# Patient Record
Sex: FEMALE | Race: OTHER | Employment: UNEMPLOYED | URBAN - METROPOLITAN AREA
[De-identification: names, ages, dates, MRNs, and addresses within clinical notes are randomized per-mention and may not be internally consistent; named-entity substitution may affect disease eponyms.]

---

## 2017-01-15 RX ORDER — LEVOTHYROXINE SODIUM 0.12 MG/1
TABLET ORAL
Qty: 90 TABLET | Refills: 0 | Status: SHIPPED | OUTPATIENT
Start: 2017-01-15 | End: 2017-05-02

## 2017-01-15 NOTE — TELEPHONE ENCOUNTER
From: rTessa Saab  To:  Maritza Rose MD  Sent: 1/11/2017 10:36 PM CST  Subject: Medication Renewal Request    Original authorizing provider: Karey Galeazzi, MD Marcelyn Seamen would like a refill of the following medications:  Bacilio Schmidt

## 2017-01-15 NOTE — TELEPHONE ENCOUNTER
Hypothyroid Medications: Medication refilled for 90 days per protocol.     Protocol Criteria:  Appointment scheduled in the past 12 months or the next 3 months  TSH resulted in the past 12 months that is normal  Recent Visits       Provider Department [facility]

## 2017-02-16 ENCOUNTER — TELEPHONE (OUTPATIENT)
Dept: FAMILY MEDICINE CLINIC | Facility: CLINIC | Age: 42
End: 2017-02-16

## 2017-02-16 NOTE — TELEPHONE ENCOUNTER
Pt would like a copy of her progress note in detail from the office visit for 7-1-16. Per pt she needs this for the insurance. Pt would like to  the note tomorrow at the [facility] location. Pt would like a call back in Kazakh.  Please,call pt with any que

## 2017-02-18 NOTE — TELEPHONE ENCOUNTER
Spoke to patient notified her that copies of her office visit notes from 07/2016 are ready to be picked up. Patient can stop by ADO . Patient verbalized understanding and stated she would stop by today to .

## 2017-03-01 ENCOUNTER — TELEPHONE (OUTPATIENT)
Dept: FAMILY MEDICINE CLINIC | Facility: CLINIC | Age: 42
End: 2017-03-01

## 2017-03-02 NOTE — TELEPHONE ENCOUNTER
Congolese speaking - pt stts her  and son dropped papers  in the inbox of nurse Grimaldo. Pt wanted to know if we received them.  Please advise

## 2017-03-02 NOTE — TELEPHONE ENCOUNTER
I called the lab from which patient is receiving bill regarding DOS 01/17/17, this patient was not seen on that DOS with Dr Bereket Oneil. Spoke to April from the lab and she said that she would look into it for now but that patient could disregard the bill.  The labs

## 2017-03-21 ENCOUNTER — TELEPHONE (OUTPATIENT)
Dept: FAMILY MEDICINE CLINIC | Facility: CLINIC | Age: 42
End: 2017-03-21

## 2017-03-21 NOTE — TELEPHONE ENCOUNTER
Tanzanian Only - pt. Called in regards to symptoms she is having. Stated that the last time she had a doctor's visit, she was advised that she was in menopause. However, she has been taking birth control and has gotten her period twice already.  She isn't in

## 2017-03-21 NOTE — TELEPHONE ENCOUNTER
Actions Requested:   Situation/Background   Problem: Vaginal Bleeding. Onset: Today 03/21/17   Associated Symptoms:  Patient had her menstrual period 2 weeks ago which lasted for 6 days. Denies any abdominal cramps or other gynecological symptoms.     His

## 2017-03-22 NOTE — TELEPHONE ENCOUNTER
Spoke to pt via language line. [id number] f/u on vaginal bleeding. Appt scheduled for 4/3. Per pt bleeding sized less than quarter sized. Denies changing pads often, denies SOB, lightheadedness, dizziness, or feeling of faint.  Per pt cannot be seen sooner due

## 2017-04-03 ENCOUNTER — OFFICE VISIT (OUTPATIENT)
Dept: FAMILY MEDICINE CLINIC | Facility: CLINIC | Age: 42
End: 2017-04-03

## 2017-04-03 ENCOUNTER — LAB ENCOUNTER (OUTPATIENT)
Dept: LAB | Age: 42
End: 2017-04-03
Attending: FAMILY MEDICINE
Payer: COMMERCIAL

## 2017-04-03 VITALS
TEMPERATURE: 98 F | DIASTOLIC BLOOD PRESSURE: 90 MMHG | HEART RATE: 87 BPM | WEIGHT: 221 LBS | SYSTOLIC BLOOD PRESSURE: 146 MMHG | BODY MASS INDEX: 37 KG/M2

## 2017-04-03 DIAGNOSIS — A04.8 HELICOBACTER PYLORI INFECTION: Primary | ICD-10-CM

## 2017-04-03 DIAGNOSIS — E03.9 ACQUIRED HYPOTHYROIDISM: ICD-10-CM

## 2017-04-03 DIAGNOSIS — A04.8 HELICOBACTER PYLORI INFECTION: ICD-10-CM

## 2017-04-03 DIAGNOSIS — N92.1 MENORRHAGIA WITH IRREGULAR CYCLE: Primary | ICD-10-CM

## 2017-04-03 PROCEDURE — 83013 H PYLORI (C-13) BREATH: CPT

## 2017-04-03 PROCEDURE — 36416 COLLJ CAPILLARY BLOOD SPEC: CPT | Performed by: FAMILY MEDICINE

## 2017-04-03 PROCEDURE — 85018 HEMOGLOBIN: CPT | Performed by: FAMILY MEDICINE

## 2017-04-03 PROCEDURE — 99214 OFFICE O/P EST MOD 30 MIN: CPT | Performed by: FAMILY MEDICINE

## 2017-04-03 RX ORDER — DOXYCYCLINE 100 MG/1
100 TABLET ORAL 2 TIMES DAILY
Qty: 14 TABLET | Refills: 0 | Status: SHIPPED | OUTPATIENT
Start: 2017-04-03 | End: 2017-05-13 | Stop reason: ALTCHOICE

## 2017-04-03 RX ORDER — PANTOPRAZOLE SODIUM 20 MG/1
20 TABLET, DELAYED RELEASE ORAL
COMMUNITY
End: 2017-05-13

## 2017-04-03 NOTE — PROGRESS NOTES
4/3/2017  8:59 AM    Newton Decker is a 43year old female.     Chief complaint(s): Patient presents with:  Menstrual Problem: pt c/o irregular vaginal bleeding, states period started 3/6/2017 and still has vaginal bleeding as of today    HPI:     Acacia Coreas Immunization History  Administered            Date(s) Administered    Flulaval, 3 Years & >, IM                          10/16/2007  09/28/2010      Fluvirin, 3 Years & >, Im                          12/13/2005 11/02/2006 11/06/2012      Influenza depressed mood. PHYSICAL EXAM:   Physical Exam    Constitutional: She appears well-developed and well-nourished. /90 mmHg  Pulse 87  Temp(Src) 98.1 °F (36.7 °C) (Oral)  Wt 221 lb (100.245 kg)  LMP 03/06/2017  Breastfeeding?  No   HENT:   Head: Encounter  POC Hemoglobin [97926]  Chlamydia/Gc Amplification [E]        RECOMMENDATIONS given include: Please, call our office with any questions or concerns.  Notify Dr Angelo Devine or the [facility] if there is a deterioration or worsening of the medica daily.      Norethin-Eth Estrad Triphasic (ORTHO-NOVUM 7/7/7, 28,) 0.5/0.75/1-35 MG-MCG Oral Tab 1 Package 6      Sig: Take 1 tablet by mouth daily.            Imaging & Referrals:  None         Jaziel Ribeiro MD

## 2017-04-04 ENCOUNTER — TELEPHONE (OUTPATIENT)
Dept: FAMILY MEDICINE CLINIC | Facility: CLINIC | Age: 42
End: 2017-04-04

## 2017-04-04 DIAGNOSIS — R10.13 EPIGASTRIC ABDOMINAL PAIN: Primary | ICD-10-CM

## 2017-04-04 NOTE — TELEPHONE ENCOUNTER
Patient called again regarding the status of her message. She is still having pain and she states that she has taken pantoprazole but the pain is still there. Patient speaks Haitian.

## 2017-04-04 NOTE — TELEPHONE ENCOUNTER
Had a visit with Dr. Natalia Pierre yesterday 04/03. Prescribed her the following medication:   Pantoprazole Sodium 20 MG Oral Tab EC Take 20 mg by mouth every morning before breakfast. Disp:  Rfl:      States that she is experiencing symptoms.  Pain keeps coming

## 2017-04-05 NOTE — PROGRESS NOTES
Quick Note:    Please call patient, results + for H pylori will start treatment after her next appt with me  ______

## 2017-04-06 ENCOUNTER — TELEPHONE (OUTPATIENT)
Dept: FAMILY MEDICINE CLINIC | Facility: CLINIC | Age: 42
End: 2017-04-06

## 2017-04-06 ENCOUNTER — TELEPHONE (OUTPATIENT)
Dept: INTERNAL MEDICINE CLINIC | Facility: CLINIC | Age: 42
End: 2017-04-06

## 2017-04-06 NOTE — TELEPHONE ENCOUNTER
----- Message from Radha Moctezuma MD sent at 4/5/2017  2:38 PM CDT -----  Please call patient, results + for H pylori will start treatment after her next appt with me    Notes Recorded by Glenys Maldonado MD on 4/4/2017 at 12:50 PM  Please call edvin

## 2017-04-06 NOTE — TELEPHONE ENCOUNTER
Fernandez Garcia -  Pt calling states Dr was going to increase the dosage of her birthcontrol med. Pt would like to know if Dr still wants to change her med or is she to continue on same dosage.  Pt states meds she  at pharmacy were the same dosage sh

## 2017-04-06 NOTE — TELEPHONE ENCOUNTER
With  # [id number]    Spoke with patient and relayed doctor message in regards to test results. Verbalized understanding.  Patient wanted to make appt with RM for next week - is having US Abdomen done 4/10/17 - Appt made for patient per her request.

## 2017-04-10 ENCOUNTER — HOSPITAL ENCOUNTER (OUTPATIENT)
Dept: ULTRASOUND IMAGING | Age: 42
Discharge: HOME | End: 2017-04-10
Attending: FAMILY MEDICINE
Payer: COMMERCIAL

## 2017-04-10 DIAGNOSIS — R10.13 EPIGASTRIC ABDOMINAL PAIN: ICD-10-CM

## 2017-04-10 PROCEDURE — 76700 US EXAM ABDOM COMPLETE: CPT

## 2017-04-11 ENCOUNTER — OFFICE VISIT (OUTPATIENT)
Dept: FAMILY MEDICINE CLINIC | Facility: CLINIC | Age: 42
End: 2017-04-11

## 2017-04-11 VITALS
TEMPERATURE: 98 F | SYSTOLIC BLOOD PRESSURE: 148 MMHG | DIASTOLIC BLOOD PRESSURE: 88 MMHG | HEART RATE: 90 BPM | BODY MASS INDEX: 36.82 KG/M2 | HEIGHT: 65 IN | WEIGHT: 221 LBS

## 2017-04-11 DIAGNOSIS — M77.11 EPICONDYLITIS, LATERAL, RIGHT: ICD-10-CM

## 2017-04-11 DIAGNOSIS — E28.2 POLYCYSTIC DISEASE, OVARIES: Primary | ICD-10-CM

## 2017-04-11 DIAGNOSIS — E66.3 OVERWEIGHT(278.02): ICD-10-CM

## 2017-04-11 DIAGNOSIS — R10.13 EPIGASTRIC ABDOMINAL PAIN: ICD-10-CM

## 2017-04-11 PROCEDURE — 99214 OFFICE O/P EST MOD 30 MIN: CPT | Performed by: FAMILY MEDICINE

## 2017-04-11 PROCEDURE — 99213 OFFICE O/P EST LOW 20 MIN: CPT | Performed by: FAMILY MEDICINE

## 2017-04-11 NOTE — PROGRESS NOTES
4/11/2017  10:38 AM    Afshan Pendleton is a 43year old female. Chief complaint(s): Patient presents with: Follow - Up: Patient here to f/u on her test results from ultrasound and labs.       HPI:     Afshan Pendleton primary complaint is regardi Circulatory Problems Mother      Peripheral vascular disease   • Cancer Paternal Grandfather      Liver cancer    • Alcohol and Other Disorders Associated Paternal Grandfather      Alcoholism   • Diabetes Paternal Grandmother      Type 2      Social Histor times daily with meals. Disp: 60 tablet Rfl: 1   FLUoxetine HCl 20 MG Oral Tab Take 1 tablet (20 mg total) by mouth daily.  Disp: 30 tablet Rfl: 4       Allergies:  No Known Allergies      ROS:   Review of Systems   Constitutional: Positive for unexpected w EKG / Spirometry : -     Radiology: Us Abdomen Complete (cpt=76700)    4/10/2017  PROCEDURE: US ABDOMEN COMPLETE (CPT=76700)  COMPARISON: None. INDICATIONS: Epigastric pain. Hypothyroidism. Polycystic ovarian syndrome. ?  Hypertensive  TECHNIQUE:   Th then 2.4mg for the 4th week , then increase to 3.0mg on the 5th week and remain at 3.0 mg SQ every night. RECOMMENDATIONS given include: Please, call our office with any questions or concerns.  Notify Dr Alta Dia or the [facility] if there is a Visit:  No orders of the defined types were placed in this encounter.        Meds This Visit:    Signed Prescriptions Disp Refills    Liraglutide -Weight Management (SAXENDA) 18 MG/3ML Subcutaneous Solution Pen-injector 2 pen 4      Sig: Inject 3 mg into th

## 2017-04-12 ENCOUNTER — TELEPHONE (OUTPATIENT)
Dept: FAMILY MEDICINE CLINIC | Facility: CLINIC | Age: 42
End: 2017-04-12

## 2017-04-12 NOTE — TELEPHONE ENCOUNTER
[facility] calling states please advise med only come s in box of 5 would you like to give pt one or two boxes.  Please call with verbal.       Current Outpatient Prescriptions:  Liraglutide -Weight Management (SAXENDA) 18 MG/3ML Subcutaneous Solution

## 2017-04-13 NOTE — TELEPHONE ENCOUNTER
Pt states that the medication that was prescribed to her comes in a box of 5 and would like to know if it can be changed or can doctor prescribe something different to her.  Pt can be reached at 21 [phone], pt would like a call back from [address] [facility]

## 2017-04-13 NOTE — TELEPHONE ENCOUNTER
Guyanese speaking - pt stts she would like a PA for Rx Saxenda. Please advise     Current outpatient prescriptions:   •  Liraglutide -Weight Management (SAXENDA) 18 MG/3ML Subcutaneous Solution Pen-injector, Inject 3 mg into the skin nightly.  Start 0.6mg fo

## 2017-04-14 NOTE — TELEPHONE ENCOUNTER
PA for Saxenda 18 mg/3 ml subcutaneous solution pen injector completed with [facility] via CMM response time 24-72 hours KEY [id number].

## 2017-04-29 NOTE — TELEPHONE ENCOUNTER
FYI - Hebrew only - patient called to follow up on status of PA for medication. i advised pt. That insurance did not cover that specific medication - pt.  Is requesting for a new script or medication that will be covered by the insurance without any furthe

## 2017-05-02 RX ORDER — NAPROXEN 500 MG/1
500 TABLET ORAL 2 TIMES DAILY WITH MEALS
Qty: 60 TABLET | Refills: 2 | Status: SHIPPED | OUTPATIENT
Start: 2017-05-02 | End: 2018-04-14

## 2017-05-02 RX ORDER — LEVOTHYROXINE SODIUM 0.12 MG/1
TABLET ORAL
Qty: 90 TABLET | Refills: 0 | Status: SHIPPED | OUTPATIENT
Start: 2017-05-02 | End: 2017-08-16

## 2017-05-02 NOTE — TELEPHONE ENCOUNTER
From: Luz Maria Allen  To: Clarisse Thomas MD  Sent: 4/27/2017 1:50 PM CDT  Subject: Medication Renewal Request    Original authorizing provider: MD Luz Maria Rainey would like a refill

## 2017-05-02 NOTE — TELEPHONE ENCOUNTER
Chart reviewed. Refills sent per Triage Dept protocol.      Hypothyroid Medications  Protocol Criteria:  Appointment scheduled in the past 12 months or the next 3 months  TSH resulted in the past 12 months that is normal  Recent Visits       Provider Depart

## 2017-05-04 ENCOUNTER — TELEPHONE (OUTPATIENT)
Dept: FAMILY MEDICINE CLINIC | Facility: CLINIC | Age: 42
End: 2017-05-04

## 2017-05-04 NOTE — TELEPHONE ENCOUNTER
Pt is calling state that her insurance is not covering her medication due to her maiden Name  Pt is requesting to speak with a Rn state that she is out of med and need to get this fix ASAP  Pt only speak Malay

## 2017-05-12 NOTE — TELEPHONE ENCOUNTER
Spoke to pt, pt frustrated due to last name changes between maiden name and  name. Pt was able to receive medications (Naproxen and levothyroxine). Pt did not want to fill new RX - due to not wanting more trouble with pharmacy.    Pt was advised to a

## 2017-05-13 ENCOUNTER — OFFICE VISIT (OUTPATIENT)
Dept: FAMILY MEDICINE CLINIC | Facility: CLINIC | Age: 42
End: 2017-05-13

## 2017-05-13 VITALS
HEART RATE: 77 BPM | BODY MASS INDEX: 37 KG/M2 | WEIGHT: 224 LBS | TEMPERATURE: 98 F | DIASTOLIC BLOOD PRESSURE: 83 MMHG | SYSTOLIC BLOOD PRESSURE: 130 MMHG

## 2017-05-13 DIAGNOSIS — K64.8 INTERNAL HEMORRHOID: ICD-10-CM

## 2017-05-13 DIAGNOSIS — R10.13 DYSPEPSIA: Primary | ICD-10-CM

## 2017-05-13 PROCEDURE — 99212 OFFICE O/P EST SF 10 MIN: CPT | Performed by: FAMILY MEDICINE

## 2017-05-13 PROCEDURE — 99214 OFFICE O/P EST MOD 30 MIN: CPT | Performed by: FAMILY MEDICINE

## 2017-05-13 RX ORDER — FAMOTIDINE 40 MG/1
40 TABLET, FILM COATED ORAL DAILY
Qty: 60 TABLET | Refills: 0 | Status: SHIPPED | OUTPATIENT
Start: 2017-05-13 | End: 2017-06-12

## 2017-05-13 NOTE — PROGRESS NOTES
5/13/2017  12:00 PM    Rima Menon is a 43year old female. Chief complaint(s): Patient presents with:  Abdominal Pain: Patient c/o abdominal pain. Sx started since yesterday. She had bloating, blood in stools nausea and vomiting.    Medi 09/23/2014 09/16/2015      Influenza Vaccine, No Preserv, 3YR +                          10/01/2016      Influenza Virus Vaccine, H1N1                          12/19/2009      TD                    02/29/2004      TDAP Neurological: Negative for seizures and headaches. Psychiatric/Behavioral: Negative for depressed mood. PHYSICAL EXAM:   Physical Exam    Constitutional: She appears well-developed and well-nourished.    /83 mmHg  Pulse 77  Temp(Src) 98 °F ( Schedule a follow-up visit in 3 weeks. Need to recheck stool guiac test          Orders This Visit:  No orders of the defined types were placed in this encounter.        Meds This Visit:    Signed Prescriptions Disp Refills    famotidine 40 MG Oral Tab 60 t

## 2017-05-15 NOTE — TELEPHONE ENCOUNTER
Patient called in stating that she called her insurance and they advised her that they don't cover this kind of medication.  They advised her for Dr. Bereket Oneil to prescribe her something that is affordable - not too expensive, as it will be self pay otherwise emilia

## 2017-05-16 NOTE — TELEPHONE ENCOUNTER
Spoke with patient regarding medication p/ Dr. Sandy Lloyd pt can self pay for medication if not she can try exercising and dieting for a couple weeks then f/u with Dr. Erickson where they can discuss possibly trying  Phentermine. Patient verbalized understanding.  No fur

## 2017-06-12 ENCOUNTER — OFFICE VISIT (OUTPATIENT)
Dept: FAMILY MEDICINE CLINIC | Facility: CLINIC | Age: 42
End: 2017-06-12

## 2017-06-12 VITALS
HEART RATE: 76 BPM | SYSTOLIC BLOOD PRESSURE: 124 MMHG | WEIGHT: 229 LBS | DIASTOLIC BLOOD PRESSURE: 70 MMHG | BODY MASS INDEX: 38 KG/M2 | TEMPERATURE: 98 F

## 2017-06-12 DIAGNOSIS — E66.09 NON MORBID OBESITY DUE TO EXCESS CALORIES: ICD-10-CM

## 2017-06-12 DIAGNOSIS — R10.13 DYSPEPSIA: Primary | ICD-10-CM

## 2017-06-12 PROCEDURE — 99214 OFFICE O/P EST MOD 30 MIN: CPT | Performed by: FAMILY MEDICINE

## 2017-06-12 PROCEDURE — 99213 OFFICE O/P EST LOW 20 MIN: CPT | Performed by: FAMILY MEDICINE

## 2017-06-12 PROCEDURE — 96372 THER/PROPH/DIAG INJ SC/IM: CPT | Performed by: FAMILY MEDICINE

## 2017-06-12 RX ORDER — NORETHINDRONE AND ETHINYL ESTRADIOL 7 DAYS X 3
KIT ORAL
COMMUNITY
Start: 2017-06-02 | End: 2017-12-04

## 2017-06-12 RX ORDER — CYANOCOBALAMIN 1000 UG/ML
1000 INJECTION INTRAMUSCULAR; SUBCUTANEOUS ONCE
Status: COMPLETED | OUTPATIENT
Start: 2017-06-12 | End: 2017-06-12

## 2017-06-12 RX ORDER — PANTOPRAZOLE SODIUM 20 MG/1
20 TABLET, DELAYED RELEASE ORAL
COMMUNITY
End: 2017-08-08

## 2017-06-12 RX ORDER — PHENTERMINE HYDROCHLORIDE 37.5 MG/1
37.5 CAPSULE ORAL EVERY MORNING
Qty: 30 CAPSULE | Refills: 0 | Status: SHIPPED | OUTPATIENT
Start: 2017-06-12 | End: 2017-07-12

## 2017-06-12 RX ADMIN — CYANOCOBALAMIN 1000 MCG: 1000 INJECTION INTRAMUSCULAR; SUBCUTANEOUS at 18:58:00

## 2017-06-12 NOTE — PROGRESS NOTES
6/12/2017  6:09 PM    Vero Holliday is a 43year old female. Chief complaint(s): Patient presents with: Follow - Up  Headache: recently she started with headaches and a burning sensation on her face and throat.    Dyspepsia    HPI:     Lakshmi Comp Status: Never Used                        Alcohol Use: No                 Immunizations:     Immunization History  Administered            Date(s) Administered    Flulaval, 3 Years & >, IM                          10/16/2007  09/28/2010      Fluvirin, 3 Magdaline Aid mood.       PHYSICAL EXAM:   Physical Exam    Constitutional: She appears well-developed and well-nourished. /70 mmHg  Pulse 76  Temp(Src) 98.1 °F (36.7 °C) (Oral)  Wt 229 lb (103.874 kg)  LMP 06/05/2017   HENT:   Head: Normocephalic.    Right Ear: RECOMMENDATIONS given include: Please, call our office with any questions or concerns. Notify Dr Mikel Cabrera or the [facility] if there is a development of any new medical condition. Stop medication immediatley if she believes or becomes pregnant.  Al

## 2017-07-12 ENCOUNTER — OFFICE VISIT (OUTPATIENT)
Dept: FAMILY MEDICINE CLINIC | Facility: CLINIC | Age: 42
End: 2017-07-12

## 2017-07-12 VITALS
BODY MASS INDEX: 38 KG/M2 | SYSTOLIC BLOOD PRESSURE: 142 MMHG | WEIGHT: 230 LBS | DIASTOLIC BLOOD PRESSURE: 84 MMHG | HEART RATE: 92 BPM | TEMPERATURE: 99 F

## 2017-07-12 DIAGNOSIS — E66.09 NON MORBID OBESITY DUE TO EXCESS CALORIES: Primary | ICD-10-CM

## 2017-07-12 PROCEDURE — 96372 THER/PROPH/DIAG INJ SC/IM: CPT | Performed by: FAMILY MEDICINE

## 2017-07-12 PROCEDURE — 99214 OFFICE O/P EST MOD 30 MIN: CPT | Performed by: FAMILY MEDICINE

## 2017-07-12 PROCEDURE — 99213 OFFICE O/P EST LOW 20 MIN: CPT | Performed by: FAMILY MEDICINE

## 2017-07-12 RX ORDER — PHENTERMINE HYDROCHLORIDE 37.5 MG/1
37.5 CAPSULE ORAL EVERY MORNING
Qty: 30 CAPSULE | Refills: 0 | Status: SHIPPED | OUTPATIENT
Start: 2017-07-12 | End: 2017-08-08

## 2017-07-12 RX ORDER — CYANOCOBALAMIN 1000 UG/ML
1000 INJECTION INTRAMUSCULAR; SUBCUTANEOUS ONCE
Status: COMPLETED | OUTPATIENT
Start: 2017-07-12 | End: 2017-07-12

## 2017-07-12 RX ADMIN — CYANOCOBALAMIN 1000 MCG: 1000 INJECTION INTRAMUSCULAR; SUBCUTANEOUS at 10:48:00

## 2017-07-12 NOTE — PROGRESS NOTES
7/12/2017  9:48 AM    Yosvany Cowart is a 43year old female. Chief complaint(s): Patient presents with: Follow - Up  Weight Loss    HPI:     Yosvany oCwart primary complaint is regarding weight managment .       Patient is a 4 12/19/2009      TD                    02/29/2004      TDAP                  07/25/2013      Medications (Active prior to today's visit):    Current Outpatient Prescriptions:  Phentermine HCl 37.5 MG Oral Cap Take 1 capsule (37.5 mg tota Cardiovascular: Normal rate and regular rhythm. Pulmonary/Chest: Effort normal and breath sounds normal. She has no wheezes. She has no rales. Lymphadenopathy:     She has no cervical adenopathy. Skin: No rash noted.        LABORATORY RESULTS:   No encounter. Meds This Visit:    Signed Prescriptions Disp Refills    Phentermine HCl 37.5 MG Oral Cap 30 capsule 0      Sig: Take 1 capsule (37.5 mg total) by mouth every morning.            Imaging & Referrals:  None         Stephenie Lott MD

## 2017-08-08 ENCOUNTER — OFFICE VISIT (OUTPATIENT)
Dept: FAMILY MEDICINE CLINIC | Facility: CLINIC | Age: 42
End: 2017-08-08

## 2017-08-08 VITALS
BODY MASS INDEX: 37.49 KG/M2 | HEART RATE: 90 BPM | TEMPERATURE: 98 F | WEIGHT: 225 LBS | SYSTOLIC BLOOD PRESSURE: 130 MMHG | DIASTOLIC BLOOD PRESSURE: 80 MMHG | HEIGHT: 65 IN

## 2017-08-08 DIAGNOSIS — E66.09 NON MORBID OBESITY DUE TO EXCESS CALORIES: Primary | ICD-10-CM

## 2017-08-08 DIAGNOSIS — L65.9 ALOPECIA: ICD-10-CM

## 2017-08-08 PROCEDURE — 99214 OFFICE O/P EST MOD 30 MIN: CPT | Performed by: FAMILY MEDICINE

## 2017-08-08 PROCEDURE — 96372 THER/PROPH/DIAG INJ SC/IM: CPT | Performed by: FAMILY MEDICINE

## 2017-08-08 PROCEDURE — 99213 OFFICE O/P EST LOW 20 MIN: CPT | Performed by: FAMILY MEDICINE

## 2017-08-08 RX ORDER — PHENTERMINE HYDROCHLORIDE 37.5 MG/1
37.5 CAPSULE ORAL EVERY MORNING
Qty: 30 CAPSULE | Refills: 0 | Status: SHIPPED | OUTPATIENT
Start: 2017-08-08 | End: 2017-09-18

## 2017-08-08 RX ORDER — CYANOCOBALAMIN 1000 UG/ML
1000 INJECTION INTRAMUSCULAR; SUBCUTANEOUS ONCE
Status: COMPLETED | OUTPATIENT
Start: 2017-08-08 | End: 2017-08-08

## 2017-08-08 RX ORDER — PANTOPRAZOLE SODIUM 40 MG/1
TABLET, DELAYED RELEASE ORAL
COMMUNITY
Start: 2017-07-13 | End: 2020-06-29

## 2017-08-08 RX ADMIN — CYANOCOBALAMIN 1000 MCG: 1000 INJECTION INTRAMUSCULAR; SUBCUTANEOUS at 10:42:00

## 2017-08-08 NOTE — PROGRESS NOTES
8/8/2017  10:19 AM    Rima Menon is a 43year old female. Chief complaint(s): Patient presents with:   Follow - Up  Weight Problem  Tremors    HPI:     Rima Menon primary complaint is regarding weight management .      P 3YR +                          10/01/2016      Influenza Virus Vaccine, H1N1                          12/19/2009      TD                    02/29/2004      TDAP                  07/25/2013      Medications (Active prior to today's visit):    Current Outpat Neurological: Negative for seizures and headaches. Psychiatric/Behavioral: Negative for depressed mood. PHYSICAL EXAM:   Physical Exam    Constitutional: She is obese. She appears well-developed and well-nourished.    /80 (BP Location: Right 3.0mg on the 5th week and remain at 3.0 mg SQ every night. Liraglutide -Weight Management (SAXENDA) 18 MG/3ML Subcutaneous Solution Pen-injector 5 pen 1      Sig: Inject 3 mg into the skin nightly.       Minoxidil (ROGAINE WOMENS) 5 % External Foam 60 Apply 1 Application topically 2 (two) times daily. RECOMMENDATIONS given include: Please, call our office with any questions or concerns.  Notify Dr Roscoe Jones or the [address] [place] [address] Jean if there is a deterioration or worsening of the medical condition

## 2017-08-14 ENCOUNTER — TELEPHONE (OUTPATIENT)
Dept: INTERNAL MEDICINE CLINIC | Facility: CLINIC | Age: 42
End: 2017-08-14

## 2017-08-14 ENCOUNTER — OFFICE VISIT (OUTPATIENT)
Dept: FAMILY MEDICINE CLINIC | Facility: CLINIC | Age: 42
End: 2017-08-14

## 2017-08-14 VITALS
HEIGHT: 65 IN | TEMPERATURE: 98 F | BODY MASS INDEX: 37.49 KG/M2 | DIASTOLIC BLOOD PRESSURE: 75 MMHG | HEART RATE: 84 BPM | WEIGHT: 225 LBS | SYSTOLIC BLOOD PRESSURE: 125 MMHG

## 2017-08-14 DIAGNOSIS — G57.62 MORTON'S NEUROMA OF LEFT FOOT: ICD-10-CM

## 2017-08-14 DIAGNOSIS — R31.9 HEMATURIA, UNSPECIFIED TYPE: Primary | ICD-10-CM

## 2017-08-14 LAB
BILIRUBIN: NEGATIVE
GLUCOSE (URINE DIPSTICK): NEGATIVE MG/DL
KETONES (URINE DIPSTICK): NEGATIVE MG/DL
MULTISTIX LOT#: ABNORMAL NUMERIC
NITRITE, URINE: NEGATIVE
OCCULT BLOOD: NEGATIVE
PH, URINE: 6 (ref 4.5–8)
PROTEIN (URINE DIPSTICK): NEGATIVE MG/DL
SPECIFIC GRAVITY: 1 (ref 1–1.03)
URINE-COLOR: YELLOW
UROBILINOGEN,SEMI-QN: 0.2 MG/DL (ref 0–1.9)

## 2017-08-14 PROCEDURE — 99213 OFFICE O/P EST LOW 20 MIN: CPT | Performed by: FAMILY MEDICINE

## 2017-08-14 PROCEDURE — 99214 OFFICE O/P EST MOD 30 MIN: CPT | Performed by: FAMILY MEDICINE

## 2017-08-14 PROCEDURE — 81002 URINALYSIS NONAUTO W/O SCOPE: CPT | Performed by: FAMILY MEDICINE

## 2017-08-14 NOTE — TELEPHONE ENCOUNTER
Language line #[phone] assisted with phone call. Actions Requested: SDS appt given with Dr. Marycarmen Hardwick. Problem:  hematuria  Onset and Timing: 3days  Associated Symptoms: denies pain or fever. Not currently menstrating. No dysuria or abdominal pain.   Ag

## 2017-08-14 NOTE — PROGRESS NOTES
8/14/2017  1:28 PM    Mallorie Hill is a 43year old female.     Chief complaint(s): Patient presents with:  Bleeding: when urination  x4 days   Foot Pain: L. x2 days     HPI:     Mallorie Hill primary complaint is regarding as a Immunizations:     Immunization History  Administered            Date(s) Administered    Flulaval, 3 Years & >, IM                          10/16/2007  09/28/2010      Fluvirin, 3 Years & >, Im                          12/13/2005 11/02/2006 11/06/201 skin nightly. Disp: 5 pen Rfl: 1   FLUoxetine HCl 20 MG Oral Tab Take 1 tablet (20 mg total) by mouth daily. Disp: 30 tablet Rfl: 4       Allergies:  No Known Allergies      ROS:   Review of Systems   Constitutional: Negative for chills, fatigue and fever. OCCULT BLOOD NEGATIVE Negative   PH, URINE 6.0 4.5 - 8.0   PROTEIN (URINE DIPSTICK) NEGATIVE Negative/Trace mg/dL   UROBILINOGEN,SEMI-QN 0.2 0.0 - 1.9 mg/dL   NITRITE, URINE NEGATIVE Negative   LEUKOCYTES LARGE Negative   APPEARANCE CLOUDY Clear   URINE- [address] [place] [address] Florencelala Ochoa if there is a deterioration or worsening of the medical condition. Also, inform the doctor with any new symptoms or medications' side effects. FOLLOW-UP: Schedule a follow-up visit in prn .        Orders This Visit:    Scar Iqbal

## 2017-08-17 ENCOUNTER — TELEPHONE (OUTPATIENT)
Dept: FAMILY MEDICINE CLINIC | Facility: CLINIC | Age: 42
End: 2017-08-17

## 2017-08-17 RX ORDER — LEVOTHYROXINE SODIUM 0.12 MG/1
TABLET ORAL
Qty: 90 TABLET | Refills: 2 | Status: SHIPPED | OUTPATIENT
Start: 2017-08-17 | End: 2018-04-14

## 2017-08-18 NOTE — TELEPHONE ENCOUNTER
PA for Saxenda 18 mg/3ml subcutaneous solution pen injector completed with [facility] via CMM response time 24-72 hours KEY [id number].

## 2017-08-28 ENCOUNTER — NURSE TRIAGE (OUTPATIENT)
Dept: OTHER | Age: 42
End: 2017-08-28

## 2017-08-28 NOTE — TELEPHONE ENCOUNTER
Action Requested: Summary for Provider     []  Critical Lab, Recommendations Needed  [] Need Additional Advice  []   FYI    []   Need Orders  [] Need Medications Sent to Pharmacy  []  Other     SUMMARY: Vaginal bleeding, seen OV, no change.  Bleeding after

## 2017-09-05 ENCOUNTER — OFFICE VISIT (OUTPATIENT)
Dept: FAMILY MEDICINE CLINIC | Facility: CLINIC | Age: 42
End: 2017-09-05

## 2017-09-05 VITALS
WEIGHT: 228 LBS | HEIGHT: 65 IN | BODY MASS INDEX: 37.99 KG/M2 | TEMPERATURE: 98 F | HEART RATE: 88 BPM | SYSTOLIC BLOOD PRESSURE: 129 MMHG | DIASTOLIC BLOOD PRESSURE: 82 MMHG

## 2017-09-05 DIAGNOSIS — K59.01 SLOW TRANSIT CONSTIPATION: ICD-10-CM

## 2017-09-05 DIAGNOSIS — R11.2 NON-INTRACTABLE VOMITING WITH NAUSEA, UNSPECIFIED VOMITING TYPE: Primary | ICD-10-CM

## 2017-09-05 PROCEDURE — 99212 OFFICE O/P EST SF 10 MIN: CPT | Performed by: FAMILY MEDICINE

## 2017-09-05 PROCEDURE — 99214 OFFICE O/P EST MOD 30 MIN: CPT | Performed by: FAMILY MEDICINE

## 2017-09-05 RX ORDER — ONDANSETRON 4 MG/1
4 TABLET, ORALLY DISINTEGRATING ORAL ONCE
Status: DISCONTINUED | OUTPATIENT
Start: 2017-09-05 | End: 2017-09-05

## 2017-09-05 RX ORDER — ONDANSETRON 4 MG/1
4 TABLET, FILM COATED ORAL EVERY 8 HOURS PRN
Qty: 20 TABLET | Refills: 2 | Status: SHIPPED | OUTPATIENT
Start: 2017-09-05 | End: 2017-12-04 | Stop reason: ALTCHOICE

## 2017-09-05 NOTE — PROGRESS NOTES
9/5/2017  9:04 AM    Beatriz Sheridan is a 43year old female. Chief complaint(s): Patient presents with: Follow - Up  Weight Check  Nausea: Patient c/o nausea, poor appetite and dizziness  Bleeding: States that the bleeding still continues. 11/06/2012      Influenza             11/11/2008  10/10/2009  09/23/2014                            09/16/2015      Influenza Vaccine, No Preserv, 3YR +                          10/01/2016      Influenza Virus Vaccine, H1N1                          12/19/2 Psychiatric/Behavioral: Negative for depressed mood. PHYSICAL EXAM:   Physical Exam    Constitutional: She appears well-developed and well-nourished.    /82 (BP Location: Right arm, Patient Position: Sitting, Cuff Size: large)   Pulse 88   Tem vomiting with nausea, unspecified vomiting type  (primary encounter diagnosis)  Slow transit constipation    MEDICATIONS:     Signed Prescriptions Disp Refills    Sennosides (SENOKOT EXTRA STRENGTH) 17.2 MG Oral Tab 10 tablet 0      Sig: Take 2 tablets (34

## 2017-09-18 ENCOUNTER — OFFICE VISIT (OUTPATIENT)
Dept: FAMILY MEDICINE CLINIC | Facility: CLINIC | Age: 42
End: 2017-09-18

## 2017-09-18 ENCOUNTER — APPOINTMENT (OUTPATIENT)
Dept: LAB | Age: 42
End: 2017-09-18
Attending: FAMILY MEDICINE
Payer: COMMERCIAL

## 2017-09-18 VITALS
RESPIRATION RATE: 16 BRPM | HEIGHT: 65 IN | HEART RATE: 83 BPM | WEIGHT: 231 LBS | BODY MASS INDEX: 38.49 KG/M2 | SYSTOLIC BLOOD PRESSURE: 129 MMHG | TEMPERATURE: 98 F | DIASTOLIC BLOOD PRESSURE: 77 MMHG

## 2017-09-18 DIAGNOSIS — R10.32 LLQ PAIN: ICD-10-CM

## 2017-09-18 DIAGNOSIS — R11.0 NAUSEA: Primary | ICD-10-CM

## 2017-09-18 LAB — B-HCG SERPL-ACNC: <0.6 MIU/ML

## 2017-09-18 PROCEDURE — 99212 OFFICE O/P EST SF 10 MIN: CPT | Performed by: FAMILY MEDICINE

## 2017-09-18 PROCEDURE — 99214 OFFICE O/P EST MOD 30 MIN: CPT | Performed by: FAMILY MEDICINE

## 2017-09-18 PROCEDURE — 84702 CHORIONIC GONADOTROPIN TEST: CPT | Performed by: FAMILY MEDICINE

## 2017-09-18 RX ORDER — CIPROFLOXACIN 500 MG/1
500 TABLET, FILM COATED ORAL 2 TIMES DAILY
Qty: 20 TABLET | Refills: 0 | Status: SHIPPED | OUTPATIENT
Start: 2017-09-18 | End: 2017-09-28

## 2017-09-18 NOTE — PROGRESS NOTES
9/18/2017  5:42 PM    Billie Allred is a 43year old female. Chief complaint(s): Patient presents with:  Nausea: Patient present for follow up for nausea that she continues with since LOV. Patient states she is dry heaving from nausea.  Garcia Steele 3 Years & >, Im                          12/13/2005 11/02/2006 11/06/2012      Influenza             11/11/2008  10/10/2009  09/23/2014                            09/16/2015      Influenza Vaccine, No Preserv, 3YR +                          10/01/2016 EXAM:   Physical Exam    Constitutional: She appears well-developed and well-nourished.    /77 (BP Location: Right arm, Patient Position: Sitting, Cuff Size: large)   Pulse 83   Temp 97.9 °F (36.6 °C) (Oral)   Resp 16   Ht 5' 5\" (1.651 m)   Wt 231 lb Signed Prescriptions Disp Refills    Ciprofloxacin HCl (CIPRO) 500 MG Oral Tab 20 tablet 0      Sig: Take 1 tablet (500 mg total) by mouth 2 (two) times daily.                LABORATORY & ORDERS:   Orders Placed This Encounter      HCG, Beta Subunit, Beryle Blotter

## 2017-09-20 ENCOUNTER — TELEPHONE (OUTPATIENT)
Dept: FAMILY MEDICINE CLINIC | Facility: CLINIC | Age: 42
End: 2017-09-20

## 2017-09-20 NOTE — TELEPHONE ENCOUNTER
----- Message from Meliza Moon MD sent at 9/19/2017  8:29 AM CDT -----  Please call patient, results are within normal limits.

## 2017-10-18 RX ORDER — NORETHINDRONE AND ETHINYL ESTRADIOL 7 DAYS X 3
KIT ORAL
Qty: 84 TABLET | Refills: 2 | Status: SHIPPED | OUTPATIENT
Start: 2017-10-18 | End: 2018-07-18

## 2017-10-18 NOTE — TELEPHONE ENCOUNTER
Patient failed protocol. Script pended. Please advise.       Gynecology Medications  Protocol Criteria:  · Appointment scheduled in the past 12 months or the next 3 months  · Pap smear in the past 12 months  · Pap smear WNL manually verified  Recent Outpati

## 2017-12-04 ENCOUNTER — APPOINTMENT (OUTPATIENT)
Dept: LAB | Age: 42
End: 2017-12-04
Attending: FAMILY MEDICINE
Payer: COMMERCIAL

## 2017-12-04 ENCOUNTER — OFFICE VISIT (OUTPATIENT)
Dept: FAMILY MEDICINE CLINIC | Facility: CLINIC | Age: 42
End: 2017-12-04

## 2017-12-04 ENCOUNTER — LAB ENCOUNTER (OUTPATIENT)
Dept: LAB | Age: 42
End: 2017-12-04
Attending: FAMILY MEDICINE
Payer: COMMERCIAL

## 2017-12-04 VITALS
SYSTOLIC BLOOD PRESSURE: 148 MMHG | HEART RATE: 86 BPM | DIASTOLIC BLOOD PRESSURE: 89 MMHG | TEMPERATURE: 98 F | BODY MASS INDEX: 37.82 KG/M2 | HEIGHT: 65 IN | WEIGHT: 227 LBS

## 2017-12-04 DIAGNOSIS — Z00.00 PHYSICAL EXAM: Primary | ICD-10-CM

## 2017-12-04 DIAGNOSIS — Z00.00 PHYSICAL EXAM: ICD-10-CM

## 2017-12-04 PROCEDURE — 84443 ASSAY THYROID STIM HORMONE: CPT

## 2017-12-04 PROCEDURE — 83036 HEMOGLOBIN GLYCOSYLATED A1C: CPT

## 2017-12-04 PROCEDURE — 81015 MICROSCOPIC EXAM OF URINE: CPT | Performed by: FAMILY MEDICINE

## 2017-12-04 PROCEDURE — 90686 IIV4 VACC NO PRSV 0.5 ML IM: CPT | Performed by: FAMILY MEDICINE

## 2017-12-04 PROCEDURE — 93005 ELECTROCARDIOGRAM TRACING: CPT

## 2017-12-04 PROCEDURE — 90471 IMMUNIZATION ADMIN: CPT | Performed by: FAMILY MEDICINE

## 2017-12-04 PROCEDURE — 82306 VITAMIN D 25 HYDROXY: CPT | Performed by: FAMILY MEDICINE

## 2017-12-04 PROCEDURE — 93010 ELECTROCARDIOGRAM REPORT: CPT | Performed by: FAMILY MEDICINE

## 2017-12-04 PROCEDURE — 85025 COMPLETE CBC W/AUTO DIFF WBC: CPT

## 2017-12-04 PROCEDURE — 99396 PREV VISIT EST AGE 40-64: CPT | Performed by: FAMILY MEDICINE

## 2017-12-04 PROCEDURE — 36415 COLL VENOUS BLD VENIPUNCTURE: CPT

## 2017-12-04 PROCEDURE — 80053 COMPREHEN METABOLIC PANEL: CPT

## 2017-12-04 PROCEDURE — 80061 LIPID PANEL: CPT

## 2017-12-04 NOTE — PROGRESS NOTES
12/4/2017  10:16 AM    Sigrdi Cheng is a 43year old female. Chief complaint(s): Patient presents with:  Routine Physical  Vaginal Discharge    HPI:     Sigrid Cheng primary complaint is regarding CPE.      Adrienne Dempsey Years & >, Im                          12/13/2005 11/02/2006 11/06/2012      Influenza             11/11/2008  10/10/2009  09/23/2014                            09/16/2015      Influenza Vaccine, No Preserv, 3YR +                          10/01/2016 myalgias, back pain and joint pain. Skin: Negative for rash. Allergic/Immunologic: Negative for food allergies. Neurological: Negative for dizziness, tremors, seizures, syncope, weakness, light-headedness and headaches.    Psychiatric/Behavioral: Nega focal neurological dificits. Normal gait and coordination. Skin:   Skin dermal without rashes. Psychiatric:   Appropriate affect and misdemeanor.        LABORATORY RESULTS:   No results found for: LOUIS Gagnon activities/exercise. Patient was educated on sexual transmitted disease. Best to abstain from sexual intercourse until she is ready to form a family. Use of condoms may prevent transmission of infections as well as pregnancy.    Contraception option chosen

## 2017-12-13 ENCOUNTER — TELEPHONE (OUTPATIENT)
Dept: OTHER | Age: 42
End: 2017-12-13

## 2017-12-13 RX ORDER — METOCLOPRAMIDE 5 MG/1
5 TABLET ORAL EVERY 6 HOURS PRN
Qty: 10 TABLET | Refills: 0 | Status: SHIPPED | OUTPATIENT
Start: 2017-12-13 | End: 2018-04-02

## 2017-12-13 NOTE — TELEPHONE ENCOUNTER
No. Can take 2 tylenol and I can send reglan for vomiting. And should make appt with Dr. rKisten Chavez for migraine meds.

## 2017-12-13 NOTE — TELEPHONE ENCOUNTER
# [id number]  Pt stts has a bad headache and vomiting since this morning . Denies vomiting, fever, body aches,dizziness or blurred vision. Pt has hx of migraines. Pain level 6 right now.  Took Naproxen 500 mg 1 tablet at 8 am and another one at noon

## 2017-12-13 NOTE — TELEPHONE ENCOUNTER
Spoke with patient and made her aware of Dr. Noemi Brand orders, and of the plan of care. Patient made aware that rx for Reglan was sent to preferred pharmacy. Patient voiced understanding and agreed with plan of care.

## 2018-03-23 ENCOUNTER — NURSE TRIAGE (OUTPATIENT)
Dept: OTHER | Age: 43
End: 2018-03-23

## 2018-03-23 NOTE — TELEPHONE ENCOUNTER
Action Requested: Summary for Provider     []  Critical Lab, Recommendations Needed  [] Need Additional Advice  [x]   FYI    []   Need Orders  [] Need Medications Sent to Pharmacy  []  Other    # [id number] utilized to triage pt    SUMMARY:P

## 2018-04-02 ENCOUNTER — OFFICE VISIT (OUTPATIENT)
Dept: FAMILY MEDICINE CLINIC | Facility: CLINIC | Age: 43
End: 2018-04-02

## 2018-04-02 VITALS
SYSTOLIC BLOOD PRESSURE: 159 MMHG | WEIGHT: 244 LBS | BODY MASS INDEX: 41 KG/M2 | HEART RATE: 97 BPM | DIASTOLIC BLOOD PRESSURE: 91 MMHG | TEMPERATURE: 98 F

## 2018-04-02 DIAGNOSIS — E28.2 POLYCYSTIC DISEASE, OVARIES: ICD-10-CM

## 2018-04-02 DIAGNOSIS — N30.01 ACUTE CYSTITIS WITH HEMATURIA: ICD-10-CM

## 2018-04-02 DIAGNOSIS — N92.6 IRREGULAR MENSTRUAL CYCLE: Primary | ICD-10-CM

## 2018-04-02 PROCEDURE — 99214 OFFICE O/P EST MOD 30 MIN: CPT | Performed by: FAMILY MEDICINE

## 2018-04-02 PROCEDURE — 99213 OFFICE O/P EST LOW 20 MIN: CPT | Performed by: FAMILY MEDICINE

## 2018-04-02 PROCEDURE — 81002 URINALYSIS NONAUTO W/O SCOPE: CPT | Performed by: FAMILY MEDICINE

## 2018-04-02 RX ORDER — CIPROFLOXACIN 500 MG/1
500 TABLET, FILM COATED ORAL 2 TIMES DAILY
Qty: 20 TABLET | Refills: 0 | Status: SHIPPED | OUTPATIENT
Start: 2018-04-02 | End: 2018-04-12

## 2018-04-02 NOTE — PROGRESS NOTES
4/2/2018  3:10 PM    Sigrid Cheng is a 37year old female.     Chief complaint(s): Patient presents with:  Irregular Periods: pt c/o irregular vaginal bleeding X 1 month     HPI:     Sigrid Cheng primary complaint is regarding Alcohol use:  No                 Immunizations:     Immunization History  Administered            Date(s) Administered    Flulaval 0.5 ml 6 months & older (69481)                          12/04/2017      Flulaval, 3 Years & >, IM mood.       PHYSICAL EXAM:   Physical Exam    Constitutional: She appears well-developed and well-nourished.    /91 (BP Location: Right arm, Patient Position: Sitting, Cuff Size: large)   Pulse 97   Temp 97.9 °F (36.6 °C) (Oral)   Wt 244 lb (110.7 kg) Tab 20 tablet 0      Sig: Take 1 tablet (500 mg total) by mouth 2 (two) times daily.        continue with BCPs  LABORATORY & ORDERS:   Orders Placed This Encounter      POC Urinalysis, Manual Dip without microscopy [25003]      Urine Culture, Routine [E]

## 2018-04-14 ENCOUNTER — OFFICE VISIT (OUTPATIENT)
Dept: FAMILY MEDICINE CLINIC | Facility: CLINIC | Age: 43
End: 2018-04-14

## 2018-04-14 ENCOUNTER — LAB ENCOUNTER (OUTPATIENT)
Dept: LAB | Age: 43
End: 2018-04-14
Attending: FAMILY MEDICINE
Payer: COMMERCIAL

## 2018-04-14 VITALS
TEMPERATURE: 98 F | WEIGHT: 240 LBS | SYSTOLIC BLOOD PRESSURE: 139 MMHG | BODY MASS INDEX: 40 KG/M2 | HEART RATE: 78 BPM | DIASTOLIC BLOOD PRESSURE: 85 MMHG

## 2018-04-14 DIAGNOSIS — G56.03 BILATERAL CARPAL TUNNEL SYNDROME: ICD-10-CM

## 2018-04-14 DIAGNOSIS — E03.9 ACQUIRED HYPOTHYROIDISM: ICD-10-CM

## 2018-04-14 DIAGNOSIS — E28.2 POLYCYSTIC DISEASE, OVARIES: Primary | ICD-10-CM

## 2018-04-14 DIAGNOSIS — L91.8 SKIN TAG: ICD-10-CM

## 2018-04-14 DIAGNOSIS — E28.2 POLYCYSTIC DISEASE, OVARIES: ICD-10-CM

## 2018-04-14 DIAGNOSIS — N92.6 IRREGULAR MENSTRUAL CYCLE: ICD-10-CM

## 2018-04-14 DIAGNOSIS — B35.3 TINEA PEDIS OF RIGHT FOOT: ICD-10-CM

## 2018-04-14 PROCEDURE — 36415 COLL VENOUS BLD VENIPUNCTURE: CPT

## 2018-04-14 PROCEDURE — 99212 OFFICE O/P EST SF 10 MIN: CPT | Performed by: FAMILY MEDICINE

## 2018-04-14 PROCEDURE — 84443 ASSAY THYROID STIM HORMONE: CPT

## 2018-04-14 PROCEDURE — 99214 OFFICE O/P EST MOD 30 MIN: CPT | Performed by: FAMILY MEDICINE

## 2018-04-14 PROCEDURE — 83036 HEMOGLOBIN GLYCOSYLATED A1C: CPT

## 2018-04-14 RX ORDER — NAPROXEN 500 MG/1
500 TABLET ORAL 2 TIMES DAILY WITH MEALS
Qty: 60 TABLET | Refills: 2 | Status: SHIPPED | OUTPATIENT
Start: 2018-04-14 | End: 2018-11-02

## 2018-04-14 RX ORDER — LEVOTHYROXINE SODIUM 0.12 MG/1
TABLET ORAL
Qty: 90 TABLET | Refills: 2 | Status: SHIPPED | OUTPATIENT
Start: 2018-04-14 | End: 2018-11-06

## 2018-04-14 NOTE — PROGRESS NOTES
4/14/2018  11:17 AM    Velma Frazier is a 37year old female. Chief complaint(s): Patient presents with: Follow - Up  Irregular Periods  UTI    HPI:     Velma Frazier primary complaint is regarding multiple issues.      Jaylon Mcdaniels Alcohol use:  No                 Immunizations:     Immunization History  Administered            Date(s) Administered    Flulaval 0.5 ml 6 months & older (97889)                          12/04/2017      Flulaval, 3 Years & >, IM Exam    Constitutional: She appears well-developed and well-nourished.    /85 (BP Location: Right arm, Patient Position: Sitting, Cuff Size: large)   Pulse 78   Temp 98 °F (36.7 °C) (Oral)   Wt 240 lb (108.9 kg)   BMI 39.94 kg/m²    HENT:   Head: Norm cycle    Doing well, CPM    MEDICATIONS:     Signed Prescriptions Disp Refills    Levothyroxine Sodium 125 MCG Oral Tab 90 tablet 2      Sig: TAKE ONE TABLET BY MOUTH ONCE DAILY BEFORE BREAKFAST      naproxen 500 MG Oral Tab 60 tablet 2      Sig: Take 1 ta over-the-counter NSAIDs when needed. Also also informed to continue to check and her thyroid and continue present medication.     5. Skin tag    Patient will be given appointment in the near future for elective surgical removal.    6. Tinea pedis of right

## 2018-04-19 ENCOUNTER — TELEPHONE (OUTPATIENT)
Dept: FAMILY MEDICINE CLINIC | Facility: CLINIC | Age: 43
End: 2018-04-19

## 2018-04-19 DIAGNOSIS — N92.1 MENORRHAGIA WITH IRREGULAR CYCLE: Primary | ICD-10-CM

## 2018-04-19 NOTE — TELEPHONE ENCOUNTER
Pt states she was having vaginal bleeding which stopped, but has returned. Pt states it is worse in the morning and then just spotting during the rest of the day. Pt states her LMP was 2 weeks ago.     Pt questioning if Dr Roscoe Jones would like to see her tammi

## 2018-04-20 NOTE — TELEPHONE ENCOUNTER
Spoke with patient. STOP BCPs, do a home pregnancy test and go for a pelvic ultrasound.  Please call her back with there referral information, phone #, etc.

## 2018-04-24 NOTE — TELEPHONE ENCOUNTER
Spoke to patient was given the information to scheduling so that she can schedule her pelvic u/s. Patient verbalized understanding.  No further request.

## 2018-05-12 ENCOUNTER — HOSPITAL ENCOUNTER (OUTPATIENT)
Dept: ULTRASOUND IMAGING | Age: 43
Discharge: HOME | End: 2018-05-12
Attending: FAMILY MEDICINE
Payer: COMMERCIAL

## 2018-05-12 DIAGNOSIS — N92.1 MENORRHAGIA WITH IRREGULAR CYCLE: ICD-10-CM

## 2018-05-12 PROCEDURE — 76830 TRANSVAGINAL US NON-OB: CPT | Performed by: FAMILY MEDICINE

## 2018-05-12 PROCEDURE — 76856 US EXAM PELVIC COMPLETE: CPT | Performed by: FAMILY MEDICINE

## 2018-05-17 ENCOUNTER — OFFICE VISIT (OUTPATIENT)
Dept: FAMILY MEDICINE CLINIC | Facility: CLINIC | Age: 43
End: 2018-05-17

## 2018-05-17 VITALS
DIASTOLIC BLOOD PRESSURE: 81 MMHG | WEIGHT: 230 LBS | HEART RATE: 93 BPM | SYSTOLIC BLOOD PRESSURE: 134 MMHG | BODY MASS INDEX: 38 KG/M2

## 2018-05-17 DIAGNOSIS — H66.001 ACUTE SUPPURATIVE OTITIS MEDIA OF RIGHT EAR WITHOUT SPONTANEOUS RUPTURE OF TYMPANIC MEMBRANE, RECURRENCE NOT SPECIFIED: ICD-10-CM

## 2018-05-17 DIAGNOSIS — L91.8 SKIN TAG: Primary | ICD-10-CM

## 2018-05-17 PROCEDURE — 99212 OFFICE O/P EST SF 10 MIN: CPT | Performed by: FAMILY MEDICINE

## 2018-05-17 PROCEDURE — 11200 RMVL SKIN TAGS UP TO&INC 15: CPT | Performed by: FAMILY MEDICINE

## 2018-05-17 PROCEDURE — 99214 OFFICE O/P EST MOD 30 MIN: CPT | Performed by: FAMILY MEDICINE

## 2018-05-17 RX ORDER — AMOXICILLIN 500 MG/1
500 CAPSULE ORAL 2 TIMES DAILY
Qty: 20 CAPSULE | Refills: 0 | Status: SHIPPED | OUTPATIENT
Start: 2018-05-17 | End: 2018-05-27

## 2018-05-17 NOTE — PROGRESS NOTES
5/17/2018  3:11 PM    Aleksandar Decker is a 37year old female. Chief complaint(s): Patient presents with:  Procedure: Skin tag removal    HPI:     Aleksandar Decker primary complaint is regarding skin tags.      Patient is a 43-year 12/19/2009      TD                    02/29/2004      TDAP                  07/25/2013      Medications (Active prior to today's visit):    Current Outpatient Prescriptions:  amoxicillin 500 MG Oral Cap Take 1 capsule (500 mg total) by mouth 2 (two) times no rales. Lymphadenopathy:     She has no cervical adenopathy. Skin: No rash noted.    Bilateral axilla skin tags; left one, right 3       5/17/2018, Total face to face time was 25, more than 50% of the time was spent in counseling and/or coordination o mass in the endometrial canal.  MYOMETRIUM: Normal echogenicity. No masses. OVARIES AND ADNEXA: Normal ovarian size, volume and blood flow  RIGHT:  Normal appearance with no masses. LEFT:  Normal appearance with no masses.    CUL-DE-SAC:  Normal.  No fr given include: Please, call our office with any questions or concerns. Notify Dr Alta Dia or the [address] [place] [address] if there is a deterioration or worsening of the medical condition. Also, inform the doctor with any new symptoms or medications' side effects.

## 2018-07-07 ENCOUNTER — OFFICE VISIT (OUTPATIENT)
Dept: FAMILY MEDICINE CLINIC | Facility: CLINIC | Age: 43
End: 2018-07-07

## 2018-07-07 VITALS
HEART RATE: 88 BPM | DIASTOLIC BLOOD PRESSURE: 83 MMHG | SYSTOLIC BLOOD PRESSURE: 138 MMHG | WEIGHT: 250 LBS | BODY MASS INDEX: 42 KG/M2

## 2018-07-07 DIAGNOSIS — R05.9 COUGH: Primary | ICD-10-CM

## 2018-07-07 PROCEDURE — 99212 OFFICE O/P EST SF 10 MIN: CPT | Performed by: FAMILY MEDICINE

## 2018-07-07 PROCEDURE — 99214 OFFICE O/P EST MOD 30 MIN: CPT | Performed by: FAMILY MEDICINE

## 2018-07-07 RX ORDER — MONTELUKAST SODIUM 10 MG/1
10 TABLET ORAL NIGHTLY
Qty: 90 TABLET | Refills: 2 | Status: SHIPPED | OUTPATIENT
Start: 2018-07-07 | End: 2018-11-02

## 2018-07-07 RX ORDER — CODEINE PHOSPHATE AND GUAIFENESIN 10; 100 MG/5ML; MG/5ML
5 SOLUTION ORAL NIGHTLY PRN
Qty: 120 ML | Refills: 1 | Status: SHIPPED | OUTPATIENT
Start: 2018-07-07 | End: 2018-07-14

## 2018-07-07 NOTE — PROGRESS NOTES
2018 10:04 AM    Sigrid Cheng, : 2/10/1975  Patient presents with:   Allergies: frequent coughing PM and headaches in the AM.     HPI:     Sigrid Cheng is a 37year old female who presents for evaluation of a chief compl APPENDECTOMY    Social History:     Social History  Social History   Marital status:   Spouse name: N/A    Years of education: N/A  Number of children: N/A     Occupational History  None on file     Social History Main Topics   Smoking status: Never no obvious rashes  NECK: supple, no adenopathy, no thyromegaly  CARDIO: RRR without murmur  EXTREMITIES: no cyanosis, clubbing or edema  GI: soft, non-tender, normal bowel sounds  HEAD: normocephalic, atraumatic  EYES: sclera non icteric bilateral, conjunc guaiFENesin-codeine (CHERATUSSIN AC) 100-10 MG/5ML Oral Solution          Sig: Take 5 mL by mouth nightly as needed for cough. Dispense:  120 mL          Refill:  1    Follow Up with:  No follow-up provider specified.     Delilah Denny MD    Al

## 2018-07-14 ENCOUNTER — TELEPHONE (OUTPATIENT)
Dept: OTHER | Age: 43
End: 2018-07-14

## 2018-07-14 NOTE — TELEPHONE ENCOUNTER
Patient calling and with  AALIYAH#[id number], states that she missed the cheratussin Tuesday and then took the dose on Wednesday, started frequent coughing again , dry cough and then sore throat, sob on activity, frequent coughing heard from t

## 2018-07-15 RX ORDER — CODEINE PHOSPHATE AND GUAIFENESIN 10; 100 MG/5ML; MG/5ML
5 SOLUTION ORAL NIGHTLY PRN
Qty: 35 ML | Refills: 0 | OUTPATIENT
Start: 2018-07-15 | End: 2018-07-31 | Stop reason: ALTCHOICE

## 2018-07-16 NOTE — TELEPHONE ENCOUNTER
7/15/18 guaifenesin - codiene script called into [facility] pharmacist Jori Rao @ [facility]. Brent informed.

## 2018-07-19 RX ORDER — NORETHINDRONE AND ETHINYL ESTRADIOL 7 DAYS X 3
KIT ORAL
Qty: 84 TABLET | Refills: 2 | Status: SHIPPED | OUTPATIENT
Start: 2018-07-19 | End: 2019-04-01

## 2018-07-31 ENCOUNTER — OFFICE VISIT (OUTPATIENT)
Dept: FAMILY MEDICINE CLINIC | Facility: CLINIC | Age: 43
End: 2018-07-31
Payer: COMMERCIAL

## 2018-07-31 VITALS
WEIGHT: 255 LBS | TEMPERATURE: 98 F | BODY MASS INDEX: 42 KG/M2 | SYSTOLIC BLOOD PRESSURE: 159 MMHG | HEART RATE: 90 BPM | DIASTOLIC BLOOD PRESSURE: 91 MMHG

## 2018-07-31 DIAGNOSIS — E66.01 CLASS 3 SEVERE OBESITY DUE TO EXCESS CALORIES WITHOUT SERIOUS COMORBIDITY WITH BODY MASS INDEX (BMI) OF 40.0 TO 44.9 IN ADULT (HCC): ICD-10-CM

## 2018-07-31 DIAGNOSIS — R05.9 COUGH: ICD-10-CM

## 2018-07-31 DIAGNOSIS — I10 ESSENTIAL HYPERTENSION: Primary | ICD-10-CM

## 2018-07-31 PROCEDURE — 99212 OFFICE O/P EST SF 10 MIN: CPT | Performed by: FAMILY MEDICINE

## 2018-07-31 PROCEDURE — 99214 OFFICE O/P EST MOD 30 MIN: CPT | Performed by: FAMILY MEDICINE

## 2018-07-31 RX ORDER — CODEINE PHOSPHATE AND GUAIFENESIN 10; 100 MG/5ML; MG/5ML
5 SOLUTION ORAL NIGHTLY PRN
Qty: 35 ML | Refills: 0 | Status: SHIPPED | OUTPATIENT
Start: 2018-07-31 | End: 2019-04-13

## 2018-07-31 RX ORDER — AZITHROMYCIN 250 MG/1
TABLET, FILM COATED ORAL
Qty: 6 TABLET | Refills: 0 | Status: SHIPPED | OUTPATIENT
Start: 2018-07-31 | End: 2018-08-21 | Stop reason: ALTCHOICE

## 2018-07-31 RX ORDER — ASPIRIN 81 MG/1
81 TABLET ORAL DAILY
Qty: 90 TABLET | Refills: 1 | Status: SHIPPED | OUTPATIENT
Start: 2018-07-31 | End: 2019-04-13

## 2018-07-31 RX ORDER — AMLODIPINE BESYLATE 5 MG/1
5 TABLET ORAL DAILY
Qty: 90 TABLET | Refills: 2 | Status: SHIPPED | OUTPATIENT
Start: 2018-07-31 | End: 2019-04-13

## 2018-07-31 NOTE — PROGRESS NOTES
2018 3:47 PM    Martita Aquino, : 2/10/1975  Patient presents with:  Cough  Headache: pounding and nausea x3 days    HPI:     Martita  is a 37year old female who presents for evaluation of a chief complaint of sore History:     Social History  Social History   Marital status:   Spouse name: N/A    Years of education: N/A  Number of children: N/A     Occupational History  None on file     Social History Main Topics   Smoking status: Never Smoker    Smokeless to large)   Pulse 90   Temp 98 °F (36.7 °C) (Oral)   Wt 255 lb (115.7 kg)   Breastfeeding?  No   BMI 42.43 kg/m²     GENERAL: well developed, well nourished, well hydrated, no distress  SKIN: good skin turgor, no obvious rashes  NECK: supple, no adenopathy, no 500 MG Oral Tab, Take 1 tablet (500 mg total) by mouth 2 (two) times daily with meals. , Disp: 60 tablet, Rfl: 2  •  Pantoprazole Sodium 40 MG Oral Tab EC, , Disp: , Rfl:   RECOMMENDATIONS given include: Please, call our office with any questions or concern

## 2018-08-01 ENCOUNTER — TELEPHONE (OUTPATIENT)
Dept: FAMILY MEDICINE CLINIC | Facility: CLINIC | Age: 43
End: 2018-08-01

## 2018-08-01 NOTE — TELEPHONE ENCOUNTER
Via [facility] I. ghanshyam Ko spoke with pt and informed that per Dr Dean Herr if systolic is > 173 she can take Amlodipine today otherwise take the next dose tomorrow. Per pt b/p this morning was 144/80. Pt verb understanding. And will take this morning.         [8/1

## 2018-08-21 ENCOUNTER — OFFICE VISIT (OUTPATIENT)
Dept: FAMILY MEDICINE CLINIC | Facility: CLINIC | Age: 43
End: 2018-08-21
Payer: COMMERCIAL

## 2018-08-21 VITALS
RESPIRATION RATE: 16 BRPM | BODY MASS INDEX: 42 KG/M2 | WEIGHT: 253 LBS | DIASTOLIC BLOOD PRESSURE: 83 MMHG | SYSTOLIC BLOOD PRESSURE: 136 MMHG | HEART RATE: 91 BPM | TEMPERATURE: 98 F

## 2018-08-21 DIAGNOSIS — K29.00 ACUTE GASTRITIS WITHOUT HEMORRHAGE, UNSPECIFIED GASTRITIS TYPE: ICD-10-CM

## 2018-08-21 DIAGNOSIS — I10 ESSENTIAL HYPERTENSION: Primary | ICD-10-CM

## 2018-08-21 DIAGNOSIS — M79.671 RIGHT FOOT PAIN: ICD-10-CM

## 2018-08-21 PROCEDURE — 99214 OFFICE O/P EST MOD 30 MIN: CPT | Performed by: FAMILY MEDICINE

## 2018-08-21 PROCEDURE — 99212 OFFICE O/P EST SF 10 MIN: CPT | Performed by: FAMILY MEDICINE

## 2018-08-21 NOTE — PROGRESS NOTES
8/21/2018  4:48 PM    Mimi Baker is a 37year old female. Chief complaint(s): Patient presents with:  HTN: Follow up  Foot Pain: Follow up to right foot pain.  Pain 8/10 to right foot  Vomiting: Pt states she vomited this morning and has History  Administered            Date(s) Administered    Flulaval 0.5 ml 6 months & older (74482)                          12/04/2017      Flulaval, 3 Years & >, IM                          10/16/2007  09/28/2010      Fluvirin, 3 Years & >, Im constipation. Musculoskeletal: Negative for back pain. Right distal foot pain   Neurological: Negative for seizures and headaches. Psychiatric/Behavioral: Negative for depressed mood.        PHYSICAL EXAM:   Physical Exam    Constitutional: She a pain    MEDICATIONS:  CPM     RECOMMENDATIONS given include: Please, call our office with any questions or concerns. Notify Dr Ricardo Hutchinson or the [address] [place], [place] [address] if th2ere is a deterioration or worsening of the medical condition.  Also, inform the doctor with

## 2018-10-23 ENCOUNTER — TELEPHONE (OUTPATIENT)
Dept: FAMILY MEDICINE CLINIC | Facility: CLINIC | Age: 43
End: 2018-10-23

## 2018-10-23 RX ORDER — IBUPROFEN 600 MG/1
600 TABLET ORAL EVERY 6 HOURS PRN
Qty: 60 TABLET | Refills: 0 | Status: SHIPPED | OUTPATIENT
Start: 2018-10-23 | End: 2019-04-13

## 2018-11-02 ENCOUNTER — OFFICE VISIT (OUTPATIENT)
Dept: FAMILY MEDICINE CLINIC | Facility: CLINIC | Age: 43
End: 2018-11-02
Payer: COMMERCIAL

## 2018-11-02 ENCOUNTER — APPOINTMENT (OUTPATIENT)
Dept: LAB | Age: 43
End: 2018-11-02
Attending: FAMILY MEDICINE
Payer: COMMERCIAL

## 2018-11-02 ENCOUNTER — LAB ENCOUNTER (OUTPATIENT)
Dept: LAB | Age: 43
End: 2018-11-02
Attending: FAMILY MEDICINE
Payer: COMMERCIAL

## 2018-11-02 VITALS
SYSTOLIC BLOOD PRESSURE: 143 MMHG | HEART RATE: 93 BPM | TEMPERATURE: 98 F | WEIGHT: 252 LBS | DIASTOLIC BLOOD PRESSURE: 87 MMHG | HEIGHT: 64 IN | BODY MASS INDEX: 43.02 KG/M2

## 2018-11-02 DIAGNOSIS — Z00.00 PHYSICAL EXAM: Primary | ICD-10-CM

## 2018-11-02 DIAGNOSIS — Z00.00 PHYSICAL EXAM: ICD-10-CM

## 2018-11-02 PROCEDURE — 93005 ELECTROCARDIOGRAM TRACING: CPT

## 2018-11-02 PROCEDURE — 84439 ASSAY OF FREE THYROXINE: CPT

## 2018-11-02 PROCEDURE — 81015 MICROSCOPIC EXAM OF URINE: CPT | Performed by: FAMILY MEDICINE

## 2018-11-02 PROCEDURE — 99396 PREV VISIT EST AGE 40-64: CPT | Performed by: FAMILY MEDICINE

## 2018-11-02 PROCEDURE — 80053 COMPREHEN METABOLIC PANEL: CPT

## 2018-11-02 PROCEDURE — 80061 LIPID PANEL: CPT

## 2018-11-02 PROCEDURE — 82306 VITAMIN D 25 HYDROXY: CPT | Performed by: FAMILY MEDICINE

## 2018-11-02 PROCEDURE — 90686 IIV4 VACC NO PRSV 0.5 ML IM: CPT | Performed by: FAMILY MEDICINE

## 2018-11-02 PROCEDURE — 93010 ELECTROCARDIOGRAM REPORT: CPT | Performed by: FAMILY MEDICINE

## 2018-11-02 PROCEDURE — 90471 IMMUNIZATION ADMIN: CPT | Performed by: FAMILY MEDICINE

## 2018-11-02 PROCEDURE — 83036 HEMOGLOBIN GLYCOSYLATED A1C: CPT

## 2018-11-02 PROCEDURE — 84443 ASSAY THYROID STIM HORMONE: CPT

## 2018-11-02 PROCEDURE — 85025 COMPLETE CBC W/AUTO DIFF WBC: CPT

## 2018-11-02 PROCEDURE — 81001 URINALYSIS AUTO W/SCOPE: CPT | Performed by: FAMILY MEDICINE

## 2018-11-02 PROCEDURE — 36415 COLL VENOUS BLD VENIPUNCTURE: CPT

## 2018-11-02 NOTE — PROGRESS NOTES
11/2/2018  8:08 AM    Aleksandar Decker is a 37year old female. Chief complaint(s): Patient presents with:  Routine Physical    HPI:     Aleksandar Decker primary complaint is regarding CPE.      Willie Needs a 37year old  Yrs+ Quad Prsv Free 0.5 ml ([id number])                          10/01/2016      Flulaval, 3 Years & >, IM                          10/16/2007  09/28/2010      Fluvirin, 3 Years & >, Im                          12/13/2005 11/02/2006 11/06/2012      Influenza intolerance, heat intolerance, polydipsia and polyuria. Genitourinary: Negative for bladder incontinence, dysuria, urgency, vaginal discharge, menstrual problem, pelvic pain, dyspareunia and breast mass.    Musculoskeletal: Negative for myalgias, back jeb normal strength. Reflex Scores:       Patellar reflexes are 2+ on the right side and 2+ on the left side. No focal neurological dificits. Normal gait and coordination. Skin:   Skin dermal without rashes.    Psychiatric:   Appropriate affect and misdem abstain from sexual intercourse until she is ready to form a family. Use of condoms may prevent transmission of infections as well as pregnancy. Contraception option chosen by patient was BCPs.   REFUSALS:  Although recommended, the patient refuses the fo

## 2018-11-02 NOTE — PROGRESS NOTES
Please call patient, the following results abnormal thyroid test results make sure she is taking her thyroid medication and keep present appointment with me in the next 1-2 weeks.

## 2018-11-05 RX ORDER — ERGOCALCIFEROL 1.25 MG/1
50000 CAPSULE ORAL WEEKLY
Qty: 12 CAPSULE | Refills: 4 | Status: SHIPPED | OUTPATIENT
Start: 2018-11-05 | End: 2019-11-19

## 2018-11-06 ENCOUNTER — TELEPHONE (OUTPATIENT)
Dept: FAMILY MEDICINE CLINIC | Facility: CLINIC | Age: 43
End: 2018-11-06

## 2018-11-06 RX ORDER — LEVOTHYROXINE SODIUM 0.12 MG/1
TABLET ORAL
Qty: 30 TABLET | Refills: 6 | Status: SHIPPED | OUTPATIENT
Start: 2018-11-06 | End: 2018-11-12

## 2018-11-06 NOTE — TELEPHONE ENCOUNTER
Notes recorded by Benji Aj MD on 11/2/2018 at 3:41 PM CDT  Please call patient, the following results abnormal thyroid test results make sure she is taking her thyroid medication and keep present appointment with me in the next 1-2 weeks.

## 2018-11-06 NOTE — TELEPHONE ENCOUNTER
Dr Clovis Hansen, although TSH is abnormal, the patient has been taking levothyroxine, she will need refill before her appt with you on 11/10/18

## 2018-11-06 NOTE — PROGRESS NOTES
Called with the assistance of language line solutions (#). Rita Barlow [phone]  Spoke with patient (identified name and ), results reviewed and agrees with plan.   Patient has been taking levothyroxine daily, needs refills, see medication question 2018

## 2018-11-10 ENCOUNTER — OFFICE VISIT (OUTPATIENT)
Dept: FAMILY MEDICINE CLINIC | Facility: CLINIC | Age: 43
End: 2018-11-10
Payer: COMMERCIAL

## 2018-11-10 ENCOUNTER — LAB ENCOUNTER (OUTPATIENT)
Dept: LAB | Age: 43
End: 2018-11-10
Attending: FAMILY MEDICINE
Payer: COMMERCIAL

## 2018-11-10 VITALS
SYSTOLIC BLOOD PRESSURE: 139 MMHG | HEART RATE: 90 BPM | BODY MASS INDEX: 43.02 KG/M2 | DIASTOLIC BLOOD PRESSURE: 85 MMHG | TEMPERATURE: 98 F | WEIGHT: 252 LBS | HEIGHT: 64 IN

## 2018-11-10 DIAGNOSIS — E03.9 ACQUIRED HYPOTHYROIDISM: ICD-10-CM

## 2018-11-10 DIAGNOSIS — N92.0 MENORRHAGIA WITH REGULAR CYCLE: ICD-10-CM

## 2018-11-10 DIAGNOSIS — R53.83 FATIGUE, UNSPECIFIED TYPE: ICD-10-CM

## 2018-11-10 DIAGNOSIS — E03.9 ACQUIRED HYPOTHYROIDISM: Primary | ICD-10-CM

## 2018-11-10 PROCEDURE — 36415 COLL VENOUS BLD VENIPUNCTURE: CPT

## 2018-11-10 PROCEDURE — 85018 HEMOGLOBIN: CPT | Performed by: FAMILY MEDICINE

## 2018-11-10 PROCEDURE — 36416 COLLJ CAPILLARY BLOOD SPEC: CPT | Performed by: FAMILY MEDICINE

## 2018-11-10 PROCEDURE — 84443 ASSAY THYROID STIM HORMONE: CPT

## 2018-11-10 PROCEDURE — 99212 OFFICE O/P EST SF 10 MIN: CPT | Performed by: FAMILY MEDICINE

## 2018-11-10 PROCEDURE — 96372 THER/PROPH/DIAG INJ SC/IM: CPT | Performed by: FAMILY MEDICINE

## 2018-11-10 PROCEDURE — 99213 OFFICE O/P EST LOW 20 MIN: CPT | Performed by: FAMILY MEDICINE

## 2018-11-10 RX ORDER — CYANOCOBALAMIN 1000 UG/ML
1000 INJECTION INTRAMUSCULAR; SUBCUTANEOUS ONCE
Status: COMPLETED | OUTPATIENT
Start: 2018-11-10 | End: 2018-11-10

## 2018-11-10 RX ADMIN — CYANOCOBALAMIN 1000 MCG: 1000 INJECTION INTRAMUSCULAR; SUBCUTANEOUS at 13:06:00

## 2018-11-10 NOTE — PROGRESS NOTES
11/10/2018  12:03 PM    Sigrid Cheng is a 37year old female. Chief complaint(s): Patient presents with:  Fatigue: Pt c/o persistent fatigue, nocturnal dry cough and muscle aches involving lower extremities.     HPI:     Austyn Louise Fluvirin, 3 Years & >, Im                          12/13/2005 11/02/2006 11/06/2012      Influenza             11/11/2008  10/10/2009  09/23/2014                            09/16/2015      Influenza Virus Vaccine, H1N1                          12/19/2009 EXAM:   Physical Exam    Constitutional: She is obese. She appears well-developed and well-nourished.    /85 (BP Location: Left arm, Patient Position: Sitting, Cuff Size: adult)   Pulse 90   Temp 98.2 °F (36.8 °C) (Oral)   Ht 5' 4\" (1.626 m)   Wt 252 Pantoprazole Sodium 40 MG Oral Tab EC, , Disp: , Rfl:   •  guaiFENesin-codeine (CHERATUSSIN AC) 100-10 MG/5ML Oral Solution, Take 5 mL by mouth nightly as needed for cough. , Disp: 35 mL, Rfl: 0  •  aspirin (ECOTRIN LOW STRENGTH) 81 MG Oral Tab EC, Take 1 t

## 2018-11-12 RX ORDER — LEVOTHYROXINE SODIUM 137 UG/1
TABLET ORAL
Qty: 30 TABLET | Refills: 2 | Status: SHIPPED | OUTPATIENT
Start: 2018-11-12 | End: 2019-02-04

## 2018-11-12 NOTE — PROGRESS NOTES
Please call patient, the following results are abnl, increase thyroid medication new Rx sent recheck in 6 weeks. Garrison Benjamin

## 2018-11-24 ENCOUNTER — HOSPITAL ENCOUNTER (OUTPATIENT)
Dept: MAMMOGRAPHY | Age: 43
Discharge: HOME | End: 2018-11-24
Attending: FAMILY MEDICINE
Payer: COMMERCIAL

## 2018-11-24 DIAGNOSIS — Z00.00 PHYSICAL EXAM: ICD-10-CM

## 2018-11-24 PROCEDURE — 77067 SCR MAMMO BI INCL CAD: CPT | Performed by: FAMILY MEDICINE

## 2018-11-24 PROCEDURE — 77063 BREAST TOMOSYNTHESIS BI: CPT | Performed by: FAMILY MEDICINE

## 2018-12-18 ENCOUNTER — LAB ENCOUNTER (OUTPATIENT)
Dept: LAB | Age: 43
End: 2018-12-18
Attending: FAMILY MEDICINE
Payer: COMMERCIAL

## 2018-12-18 ENCOUNTER — OFFICE VISIT (OUTPATIENT)
Dept: FAMILY MEDICINE CLINIC | Facility: CLINIC | Age: 43
End: 2018-12-18
Payer: COMMERCIAL

## 2018-12-18 VITALS
WEIGHT: 244 LBS | SYSTOLIC BLOOD PRESSURE: 139 MMHG | HEART RATE: 92 BPM | BODY MASS INDEX: 42 KG/M2 | DIASTOLIC BLOOD PRESSURE: 82 MMHG

## 2018-12-18 DIAGNOSIS — R63.1 POLYDIPSIA: Primary | ICD-10-CM

## 2018-12-18 DIAGNOSIS — E66.01 CLASS 3 SEVERE OBESITY DUE TO EXCESS CALORIES WITHOUT SERIOUS COMORBIDITY WITH BODY MASS INDEX (BMI) OF 40.0 TO 44.9 IN ADULT (HCC): ICD-10-CM

## 2018-12-18 DIAGNOSIS — E03.9 ACQUIRED HYPOTHYROIDISM: ICD-10-CM

## 2018-12-18 PROCEDURE — 99213 OFFICE O/P EST LOW 20 MIN: CPT | Performed by: FAMILY MEDICINE

## 2018-12-18 PROCEDURE — 36415 COLL VENOUS BLD VENIPUNCTURE: CPT

## 2018-12-18 PROCEDURE — 84443 ASSAY THYROID STIM HORMONE: CPT

## 2018-12-18 PROCEDURE — 99212 OFFICE O/P EST SF 10 MIN: CPT | Performed by: FAMILY MEDICINE

## 2018-12-18 NOTE — PROGRESS NOTES
12/18/2018  11:30 AM    Lu Crenshaw is a 37year old female. Chief complaint(s): Patient presents with:   Other: pt c/o dry mouth and feeling thirsty X 1 week    HPI:     Lu Crenshaw primary complaint is regarding as above ([id number])                          12/04/2017 11/02/2018      FLUZONE 3 Yrs+ Quad Prsv Free 0.5 ml ([id number])                          10/01/2016      Flulaval, 3 Years & >, IM                          10/16/2007  09/28/2010      Fluvirin, 3 Years & >, Im Gastrointestinal: Negative for nausea, vomiting, abdominal pain, diarrhea and constipation. Endocrine: Positive for polydipsia and polyuria. Musculoskeletal: Negative for back pain. Neurological: Negative for seizures and headaches.    Psychiatric/B condition. Also, inform the doctor with any new symptoms or medications' side effects. FOLLOW-UP: Schedule a follow-up visit in  prn.        3. Class 3 severe obesity due to excess calories without serious comorbidity with body mass index (BMI) of 40.

## 2018-12-19 RX ORDER — NAPROXEN 500 MG/1
TABLET ORAL
Qty: 60 TABLET | Refills: 2 | Status: SHIPPED | OUTPATIENT
Start: 2018-12-19 | End: 2020-05-21

## 2018-12-26 ENCOUNTER — TELEPHONE (OUTPATIENT)
Dept: OTHER | Age: 43
End: 2018-12-26

## 2018-12-26 NOTE — TELEPHONE ENCOUNTER
Patient called stated she needs a PA for Victoza, advised patient PA nurse would contact her once a decision is received from her insurance. Contacted the [facility] pharmacy to obtain insurance information; iD# [id number] Luis Radford [phone].     Contacted

## 2018-12-28 NOTE — TELEPHONE ENCOUNTER
PA denied, plan states patient must have a documented diagnosis of type 2 diabetes AND member must be currently taking metformin.

## 2019-02-04 RX ORDER — LEVOTHYROXINE SODIUM 137 UG/1
TABLET ORAL
Qty: 30 TABLET | Refills: 2 | Status: SHIPPED | OUTPATIENT
Start: 2019-02-04 | End: 2019-04-27

## 2019-04-02 RX ORDER — NORETHINDRONE AND ETHINYL ESTRADIOL 7 DAYS X 3
KIT ORAL
Qty: 84 TABLET | Refills: 0 | Status: SHIPPED | OUTPATIENT
Start: 2019-04-02 | End: 2019-06-21

## 2019-04-02 NOTE — TELEPHONE ENCOUNTER
Please review; protocol failed.     Gynecology Medications  Protocol Criteria:  · Appointment scheduled in the past 12 months or the next 3 months  · Pap smear in the past 12 months  · Pap smear WNL manually verified  Recent Outpatient Visits            3 m

## 2019-04-13 ENCOUNTER — LAB ENCOUNTER (OUTPATIENT)
Dept: LAB | Age: 44
End: 2019-04-13
Attending: FAMILY MEDICINE
Payer: COMMERCIAL

## 2019-04-13 ENCOUNTER — OFFICE VISIT (OUTPATIENT)
Dept: FAMILY MEDICINE CLINIC | Facility: CLINIC | Age: 44
End: 2019-04-13
Payer: COMMERCIAL

## 2019-04-13 VITALS
DIASTOLIC BLOOD PRESSURE: 83 MMHG | SYSTOLIC BLOOD PRESSURE: 134 MMHG | WEIGHT: 249.38 LBS | HEART RATE: 93 BPM | BODY MASS INDEX: 42.58 KG/M2 | HEIGHT: 64 IN | TEMPERATURE: 98 F

## 2019-04-13 DIAGNOSIS — J30.9 ALLERGIC RHINITIS, UNSPECIFIED SEASONALITY, UNSPECIFIED TRIGGER: Primary | ICD-10-CM

## 2019-04-13 DIAGNOSIS — R73.03 PREDIABETES: ICD-10-CM

## 2019-04-13 DIAGNOSIS — E03.9 ACQUIRED HYPOTHYROIDISM: ICD-10-CM

## 2019-04-13 PROCEDURE — 83036 HEMOGLOBIN GLYCOSYLATED A1C: CPT

## 2019-04-13 PROCEDURE — 99213 OFFICE O/P EST LOW 20 MIN: CPT | Performed by: FAMILY MEDICINE

## 2019-04-13 PROCEDURE — 36415 COLL VENOUS BLD VENIPUNCTURE: CPT

## 2019-04-13 PROCEDURE — 84439 ASSAY OF FREE THYROXINE: CPT

## 2019-04-13 PROCEDURE — 84443 ASSAY THYROID STIM HORMONE: CPT

## 2019-04-13 PROCEDURE — 99212 OFFICE O/P EST SF 10 MIN: CPT | Performed by: FAMILY MEDICINE

## 2019-04-13 RX ORDER — AMLODIPINE BESYLATE 5 MG/1
TABLET ORAL
Qty: 90 TABLET | Refills: 1 | Status: SHIPPED | OUTPATIENT
Start: 2019-04-13 | End: 2019-10-17

## 2019-04-13 RX ORDER — FLUTICASONE PROPIONATE 50 MCG
2 SPRAY, SUSPENSION (ML) NASAL DAILY
Qty: 1 INHALER | Refills: 3 | Status: SHIPPED | OUTPATIENT
Start: 2019-04-13 | End: 2020-06-29

## 2019-04-13 RX ORDER — MONTELUKAST SODIUM 10 MG/1
10 TABLET ORAL NIGHTLY
COMMUNITY
End: 2020-06-29

## 2019-04-13 RX ORDER — CODEINE PHOSPHATE AND GUAIFENESIN 10; 100 MG/5ML; MG/5ML
5 SOLUTION ORAL EVERY 6 HOURS PRN
Qty: 120 ML | Refills: 1 | Status: SHIPPED | OUTPATIENT
Start: 2019-04-13 | End: 2019-04-22

## 2019-04-13 NOTE — PROGRESS NOTES
2019 8:19 AM    [id number], : 2/10/1975  Patient presents with:  Nasal Congestion: cough, phealm since Monday.  no fevers  Diarrhea: only Thru and Friday      HPI:     Evalala Reed is a 40 year o History:   Diagnosis Date   • Hypothyroidism    • Polycystic ovarian disease    • Rheumatic fever        Past Surgical History:   Past Surgical History:   Procedure Laterality Date   • APPENDECTOMY         Social History: Social History    Socioeconomic Hi Prsv Free 0.5 ml (70798)                          10/01/2016      Flulaval, 3 Years & >, IM                          10/16/2007  09/28/2010      Fluvirin, 3 Years & >, Im                          12/13/2005 11/02/2006 11/06/2012      Influenza wheezes  ABDOMINAL: Soft NABS, ND, NT    Labs performed this visit:  No results found for this or any previous visit (from the past 10 hour(s)). MDM/Assessment/Plan:   Assessment and Plan:    Diagnosis:    ICD-10-CM    1.  Allergic rhinitis, unspecified Take 5 mL by mouth every 6 (six) hours as needed for cough. Dispense:  120 mL          Refill:  1      Fluticasone Propionate 50 MCG/ACT Nasal Suspension          Si sprays by Each Nare route daily.           Dispense:  1 Inhaler          Refil

## 2019-04-22 ENCOUNTER — OFFICE VISIT (OUTPATIENT)
Dept: FAMILY MEDICINE CLINIC | Facility: CLINIC | Age: 44
End: 2019-04-22
Payer: COMMERCIAL

## 2019-04-22 VITALS
DIASTOLIC BLOOD PRESSURE: 84 MMHG | HEART RATE: 92 BPM | WEIGHT: 257 LBS | HEIGHT: 64 IN | BODY MASS INDEX: 43.87 KG/M2 | SYSTOLIC BLOOD PRESSURE: 135 MMHG

## 2019-04-22 DIAGNOSIS — E03.9 ACQUIRED HYPOTHYROIDISM: Primary | ICD-10-CM

## 2019-04-22 DIAGNOSIS — R73.03 PREDIABETES: ICD-10-CM

## 2019-04-22 DIAGNOSIS — E66.01 CLASS 3 SEVERE OBESITY DUE TO EXCESS CALORIES WITHOUT SERIOUS COMORBIDITY WITH BODY MASS INDEX (BMI) OF 40.0 TO 44.9 IN ADULT (HCC): ICD-10-CM

## 2019-04-22 DIAGNOSIS — E88.9 METABOLIC DISORDER: ICD-10-CM

## 2019-04-22 PROCEDURE — 99213 OFFICE O/P EST LOW 20 MIN: CPT | Performed by: FAMILY MEDICINE

## 2019-04-22 PROCEDURE — 99212 OFFICE O/P EST SF 10 MIN: CPT | Performed by: FAMILY MEDICINE

## 2019-04-22 NOTE — PROGRESS NOTES
4/22/2019  4:17 PM    Eva Lam is a 40year old female. Chief complaint(s): Patient presents with:  Lab Results: 4/13. blood test     HPI:      [id number] primary complaint is regarding as above. 12/13/2005 11/02/2006 11/06/2012      Influenza             11/11/2008  10/10/2009  09/23/2014                            09/16/2015      Influenza Virus Vaccine, H1N1                          12/19/2009      TD                    02/29/2004      T (1.626 m)   Wt 257 lb (116.6 kg)   LMP 04/01/2019 (Exact Date)   BMI 44.11 kg/m²    HENT:   Head: Normocephalic. Right Ear: External ear normal.   Left Ear: External ear normal.   Nose: No rhinorrhea.    Mouth/Throat: Oropharynx is clear and moist.   Eyes ORDERS: Orders Placed This Encounter      TSH W Reflex To Free T4 [E]    REFERRALS: DIETITIAN EDUCATION INITIAL, DIET (INTERNAL), Orders Placed This Encounter        DIETITIAN EDUCATION INITIAL, DIET (INTERNAL)       RECOMMENDATIONS given include: Please,

## 2019-04-28 RX ORDER — LEVOTHYROXINE SODIUM 137 UG/1
TABLET ORAL
Qty: 90 TABLET | Refills: 1 | Status: SHIPPED | OUTPATIENT
Start: 2019-04-28 | End: 2019-10-17

## 2019-04-28 NOTE — TELEPHONE ENCOUNTER
Refill passed per [facility] protocol.   Hypothyroid Medications  Protocol Criteria:  Appointment scheduled in the past 12 months or the next 3 months  TSH resulted in the past 12 months that is normal  Recent Outpatient Visits            6 days ago Ac

## 2019-05-16 ENCOUNTER — HOSPITAL ENCOUNTER (OUTPATIENT)
Dept: NUTRITION | Facility: HOSPITAL | Age: 44
Discharge: HOME | End: 2019-05-16
Attending: FAMILY MEDICINE
Payer: COMMERCIAL

## 2019-05-16 DIAGNOSIS — E88.9 METABOLIC DISORDER: ICD-10-CM

## 2019-05-16 DIAGNOSIS — R73.03 PREDIABETES: ICD-10-CM

## 2019-05-16 DIAGNOSIS — E66.01 CLASS 3 SEVERE OBESITY DUE TO EXCESS CALORIES WITHOUT SERIOUS COMORBIDITY WITH BODY MASS INDEX (BMI) OF 40.0 TO 44.9 IN ADULT (HCC): ICD-10-CM

## 2019-05-16 PROCEDURE — 97802 MEDICAL NUTRITION INDIV IN: CPT | Performed by: DIETITIAN, REGISTERED

## 2019-05-16 NOTE — PROGRESS NOTES
Nutrition Assessment    Eva Mijares is a 40year old female. Referred by:  Attending  Referring Physician Name: Krystina Pinedo Nutrition Therapy Comment: Metabolic disease, Pre-Diabetes, Obesity clas and does not want to develop diabetes. Discussed a balanced  Carbohydrate diet to limits gluten and soy to promote improvements in her weight and labs.     PHYSICAL ACTIVITY  · Type: other: ADLs only  · Physical Assessment: Increase activity gradually to at

## 2019-05-18 ENCOUNTER — TELEPHONE (OUTPATIENT)
Dept: OTHER | Age: 44
End: 2019-05-18

## 2019-05-18 NOTE — TELEPHONE ENCOUNTER
With French interpretor patient stated that last menstrual period ws 4/21/9 through 4/28/19- normal flow. Patient forgot to take her birth control pills-pirmella on 5/16/19 and 5/17/19. Patient did not have sex on these days.  Patient was on her second wee

## 2019-06-20 ENCOUNTER — APPOINTMENT (OUTPATIENT)
Dept: LAB | Age: 44
End: 2019-06-20
Attending: FAMILY MEDICINE
Payer: COMMERCIAL

## 2019-06-20 DIAGNOSIS — E03.9 ACQUIRED HYPOTHYROIDISM: ICD-10-CM

## 2019-06-20 PROCEDURE — 36415 COLL VENOUS BLD VENIPUNCTURE: CPT

## 2019-06-20 PROCEDURE — 84443 ASSAY THYROID STIM HORMONE: CPT

## 2019-06-22 ENCOUNTER — OFFICE VISIT (OUTPATIENT)
Dept: FAMILY MEDICINE CLINIC | Facility: CLINIC | Age: 44
End: 2019-06-22
Payer: COMMERCIAL

## 2019-06-22 VITALS
WEIGHT: 250 LBS | SYSTOLIC BLOOD PRESSURE: 119 MMHG | HEART RATE: 86 BPM | BODY MASS INDEX: 42.68 KG/M2 | HEIGHT: 64 IN | DIASTOLIC BLOOD PRESSURE: 81 MMHG

## 2019-06-22 DIAGNOSIS — R73.03 PREDIABETES: ICD-10-CM

## 2019-06-22 DIAGNOSIS — E66.01 CLASS 3 SEVERE OBESITY DUE TO EXCESS CALORIES WITHOUT SERIOUS COMORBIDITY WITH BODY MASS INDEX (BMI) OF 40.0 TO 44.9 IN ADULT (HCC): ICD-10-CM

## 2019-06-22 DIAGNOSIS — E03.9 ACQUIRED HYPOTHYROIDISM: Primary | ICD-10-CM

## 2019-06-22 PROCEDURE — 99212 OFFICE O/P EST SF 10 MIN: CPT | Performed by: FAMILY MEDICINE

## 2019-06-22 PROCEDURE — 99214 OFFICE O/P EST MOD 30 MIN: CPT | Performed by: FAMILY MEDICINE

## 2019-06-22 RX ORDER — MELOXICAM 15 MG/1
15 TABLET ORAL DAILY
Qty: 30 TABLET | Refills: 1 | Status: SHIPPED | OUTPATIENT
Start: 2019-06-22 | End: 2020-05-21

## 2019-06-22 RX ORDER — NORETHINDRONE AND ETHINYL ESTRADIOL 7 DAYS X 3
KIT ORAL
Qty: 84 TABLET | Refills: 1 | Status: SHIPPED | OUTPATIENT
Start: 2019-06-22 | End: 2019-10-22

## 2019-06-22 NOTE — TELEPHONE ENCOUNTER
Please review; protocol failed.      Requested Prescriptions     Pending Prescriptions Disp Refills   • Evans Madrigal 7/7/7 0.5/0.75/1-35 MG-MCG Oral Tab [Pharmacy Med Name: Evans Madrigal 7/7/7 TAB] 84 tablet 0     Sig: TAKE 1 TABLET BY MOUTH ONCE DAILY         Recen

## 2019-06-22 NOTE — PROGRESS NOTES
6/22/2019  9:35 AM    Eva Elizalde Him is a 40year old female.     Chief complaint(s): Patient presents with:  Thyroid Problem  Knee Pain: R. craking   Prediabetes  Over weight  HPI:     [id number] primary Immunizations:     Immunization History  Administered            Date(s) Administered    FLULAVAL 6 months & older 0.5 ml Prefilled syringe (47714)                          12/04/2017 11/02/2018      FLUZONE 3 Yrs+ Quad Prsv Free 0.5 ml (16017) unexpected weight change (overweight). Negative for chills, fatigue and fever. Respiratory: Negative for cough and wheezing. Cardiovascular: Negative for chest pain.    Gastrointestinal: Negative for nausea, vomiting, abdominal pain, diarrhea and const given include: Please, call our office with any questions or concerns. Notify Dr Jena Son or the [facility], [facility] if there is a deterioration or worsening of the medical condition. Also, inform the doctor with any new symptoms or medications' side effects. diet and to maintain a  Cardiovascular exercise for 60 minutes 3-5 times a week. Consider a  if experience difficult keep track with exercise or staying at task.   Attempt to keep a schedule that includes an adequate sleep-work-physical exer

## 2019-06-26 ENCOUNTER — TELEPHONE (OUTPATIENT)
Dept: FAMILY MEDICINE CLINIC | Facility: CLINIC | Age: 44
End: 2019-06-26

## 2019-06-26 NOTE — TELEPHONE ENCOUNTER
Prior auth:    •  Liraglutide (VICTOZA) 18 MG/3ML Subcutaneous Solution Pen-injector, Start Victoza 0.6 mg sq qhs for 1 week, then increase to 1.2 mg sq qhs for 1 week, then 1.8 mg sq qhs, Disp: 6 pen, Rfl: 2    Key: [id number]

## 2019-06-27 NOTE — TELEPHONE ENCOUNTER
[place] to obtain insurance information. ID# [id number] telephone# [phone]. PA for Victoza 18MG/3ML pen injector completed with [employer].  Rep states no PA is needed, pharmacy is running 9/30 days and plan only pays for

## 2019-07-31 NOTE — TELEPHONE ENCOUNTER
Pt would like to know if the meds Liraglutide (VICTOZA) will increase 1.8 MG and states meds at the pharmacy was still at .6 MG if yes please send new script to pharmacy.

## 2019-07-31 NOTE — TELEPHONE ENCOUNTER
Refill passed per [facility], [facility] protocol.   Diabetes Medications  Protocol Criteria:  · Appointment scheduled in the past 6 months or the next 3 months  · A1C < 7.5 in the past 6 months  · Creatinine in the past 12 months  · Creatinine result < 1.5   Rece

## 2019-08-12 ENCOUNTER — TELEPHONE (OUTPATIENT)
Dept: OTHER | Age: 44
End: 2019-08-12

## 2019-08-12 NOTE — TELEPHONE ENCOUNTER
CSS soft transferred the call, with [employer]#[phone],states that she is taking birth control medication and never missed any dose or days, noted last night with little drops of clear blood, no blood clot,chnaged 1 pad today, no fever, no abdominal pain

## 2019-08-13 NOTE — TELEPHONE ENCOUNTER
Spoke with pt and informed of Dr Savanna Modi message below. Pt verbalized understanding without an .

## 2019-09-25 ENCOUNTER — OFFICE VISIT (OUTPATIENT)
Dept: FAMILY MEDICINE CLINIC | Facility: CLINIC | Age: 44
End: 2019-09-25
Payer: COMMERCIAL

## 2019-09-25 VITALS
TEMPERATURE: 98 F | HEART RATE: 80 BPM | HEIGHT: 64 IN | SYSTOLIC BLOOD PRESSURE: 118 MMHG | WEIGHT: 239.63 LBS | BODY MASS INDEX: 40.91 KG/M2 | DIASTOLIC BLOOD PRESSURE: 76 MMHG

## 2019-09-25 DIAGNOSIS — N92.6 IRREGULAR MENSTRUAL CYCLE: ICD-10-CM

## 2019-09-25 DIAGNOSIS — M17.0 PRIMARY OSTEOARTHRITIS OF BOTH KNEES: Primary | ICD-10-CM

## 2019-09-25 DIAGNOSIS — G56.03 BILATERAL CARPAL TUNNEL SYNDROME: ICD-10-CM

## 2019-09-25 DIAGNOSIS — E28.2 POLYCYSTIC DISEASE, OVARIES: ICD-10-CM

## 2019-09-25 LAB
CUVETTE EXPIRATION DATE: NORMAL DATE
CUVETTE LOT #: NORMAL NUMERIC
HEMOGLOBIN: 14.1 G/DL (ref 12–15)

## 2019-09-25 PROCEDURE — 36416 COLLJ CAPILLARY BLOOD SPEC: CPT | Performed by: FAMILY MEDICINE

## 2019-09-25 PROCEDURE — 99212 OFFICE O/P EST SF 10 MIN: CPT | Performed by: FAMILY MEDICINE

## 2019-09-25 PROCEDURE — 85018 HEMOGLOBIN: CPT | Performed by: FAMILY MEDICINE

## 2019-09-25 PROCEDURE — 99214 OFFICE O/P EST MOD 30 MIN: CPT | Performed by: FAMILY MEDICINE

## 2019-09-25 RX ORDER — ERGOCALCIFEROL 1.25 MG/1
CAPSULE ORAL
COMMUNITY
Start: 2019-09-13 | End: 2019-10-22

## 2019-09-25 NOTE — PROGRESS NOTES
9/25/2019  8:04 AM    Eva Sonya Rupesh Sheth is a 40year old female.     Chief complaint(s): Patient presents with:  Knee Pain: bilateral x 2 wks  Irregular menses  UEs paresthesia  HPI:     Dayanara Ruffin primary co hypothyrodism. Evaluation to date: normal gyne exam last Oct 2016.     HISTORY:  Past Medical History:   Diagnosis Date   • Hypothyroidism    • Polycystic ovarian disease    • Rheumatic fever       Past Surgical History:   Procedure Laterality Date   • YANNA MOUTH ONCE DAILY Disp: 84 tablet Rfl: 1   Meloxicam 15 MG Oral Tab Take 1 tablet (15 mg total) by mouth daily.  Disp: 30 tablet Rfl: 1   LEVOTHYROXINE SODIUM 137 MCG Oral Tab TAKE 1 TABLET BY MOUTH ONCE DAILY BEFORE BREAKFAST Disp: 90 tablet Rfl: 1   metFOR placed or performed in visit on 09/25/19   HEMOGLOBIN   Result Value Ref Range    Hemoglobin 14.1 12 - 15 g/dL    Cuvette Lot # [id number] Numeric    Cuvette Expiration Date 42,421 Date       EKG / Spirometry : -     Radiology: No results found.      ASSESSMEN TAKE 1 TABLET BY MOUTH ONCE DAILY BEFORE BREAKFAST, Disp: 90 tablet, Rfl: 1  •  metFORMIN HCl 500 MG Oral Tab, Take 1 tablet (500 mg total) by mouth 2 (two) times daily with meals. , Disp: 180 tablet, Rfl: 2  •  Fluticasone Propionate 50 MCG/ACT Nasal Suspe

## 2019-09-26 ENCOUNTER — TELEPHONE (OUTPATIENT)
Dept: FAMILY MEDICINE CLINIC | Facility: CLINIC | Age: 44
End: 2019-09-26

## 2019-09-26 NOTE — TELEPHONE ENCOUNTER
Patient states that she was seen in the office yesterday and was given an order to get an x-ray. Per patient the order has the last name of Marley Zaman and the patient would like to know if it can be changed to [employer].  Patient has an appointment on

## 2019-10-07 ENCOUNTER — TELEPHONE (OUTPATIENT)
Dept: FAMILY MEDICINE CLINIC | Facility: CLINIC | Age: 44
End: 2019-10-07

## 2019-10-07 NOTE — TELEPHONE ENCOUNTER
Pt called stating she was unable to get medication for     Liraglutide (VICTOZA) 18 MG/3ML Subcutaneous Solution Pen-injector. Pharmacy informed patient she needs a prior authorization. Please advise.

## 2019-10-08 NOTE — TELEPHONE ENCOUNTER
Prior authorization for Victoza 18MG/3ML pen-injectors completed w/ [employer] on cover my meds Key: [id number]  Per CMM: This medication or product is on your plan's list of covered drugs. Prior authorization is not required at this time.  If your pharmacy has

## 2019-10-18 RX ORDER — LEVOTHYROXINE SODIUM 137 UG/1
TABLET ORAL
Qty: 90 TABLET | Refills: 1 | Status: SHIPPED | OUTPATIENT
Start: 2019-10-18 | End: 2019-10-22

## 2019-10-18 RX ORDER — AMLODIPINE BESYLATE 5 MG/1
TABLET ORAL
Qty: 90 TABLET | Refills: 1 | Status: SHIPPED | OUTPATIENT
Start: 2019-10-18 | End: 2019-10-22

## 2019-10-18 NOTE — TELEPHONE ENCOUNTER
Hypothyroid Medications  Protocol Criteria:  Appointment scheduled in the past 12 months or the next 3 months  TSH resulted in the past 12 months that is normal  Recent Outpatient Visits            3 weeks ago Primary osteoarthritis of both knees    Nigel

## 2019-10-22 ENCOUNTER — OFFICE VISIT (OUTPATIENT)
Dept: FAMILY MEDICINE CLINIC | Facility: CLINIC | Age: 44
End: 2019-10-22
Payer: COMMERCIAL

## 2019-10-22 VITALS
BODY MASS INDEX: 40.97 KG/M2 | WEIGHT: 240 LBS | HEIGHT: 64 IN | HEART RATE: 92 BPM | DIASTOLIC BLOOD PRESSURE: 83 MMHG | SYSTOLIC BLOOD PRESSURE: 123 MMHG | TEMPERATURE: 98 F

## 2019-10-22 DIAGNOSIS — M17.0 PRIMARY OSTEOARTHRITIS OF BOTH KNEES: ICD-10-CM

## 2019-10-22 DIAGNOSIS — E03.9 ACQUIRED HYPOTHYROIDISM: Primary | ICD-10-CM

## 2019-10-22 PROCEDURE — 90686 IIV4 VACC NO PRSV 0.5 ML IM: CPT | Performed by: FAMILY MEDICINE

## 2019-10-22 PROCEDURE — 99213 OFFICE O/P EST LOW 20 MIN: CPT | Performed by: FAMILY MEDICINE

## 2019-10-22 PROCEDURE — 99212 OFFICE O/P EST SF 10 MIN: CPT | Performed by: FAMILY MEDICINE

## 2019-10-22 PROCEDURE — 90471 IMMUNIZATION ADMIN: CPT | Performed by: FAMILY MEDICINE

## 2019-10-22 RX ORDER — LEVOTHYROXINE SODIUM 137 UG/1
137 TABLET ORAL
Qty: 90 TABLET | Refills: 1 | Status: SHIPPED | OUTPATIENT
Start: 2019-10-22 | End: 2020-06-02

## 2019-10-22 RX ORDER — ERGOCALCIFEROL 1.25 MG/1
50000 CAPSULE ORAL WEEKLY
Qty: 12 CAPSULE | Refills: 3 | Status: SHIPPED | OUTPATIENT
Start: 2019-10-22 | End: 2019-12-23

## 2019-10-22 RX ORDER — AMLODIPINE BESYLATE 5 MG/1
5 TABLET ORAL
Qty: 90 TABLET | Refills: 1 | Status: SHIPPED | OUTPATIENT
Start: 2019-10-22 | End: 2020-07-02

## 2019-10-22 NOTE — PROGRESS NOTES
10/22/2019  3:59 PM    Eva Souza is a 40year old female.     Chief complaint(s): Patient presents with:  Diabetes  Thyroid Problem  Test Results: x-ray results   Knee pain  HPI:     [address] illicit substance abuse       Immunizations:     Immunization History  Administered            Date(s) Administered    FLULAVAL 6 months & older 0.5 ml Prefilled syringe (63004)                          12/04/2017 11/02/2018      FLUZONE 3 Yrs+ Quad Prsv MCG/ACT Nasal Suspension, 2 sprays by Each Nare route daily. , Disp: 1 Inhaler, Rfl: 3  NAPROXEN 500 MG Oral Tab, TAKE 1 TABLET BY MOUTH TWICE DAILY WITH MEALS, Disp: 60 tablet, Rfl: 2  Pantoprazole Sodium 40 MG Oral Tab EC, , Disp: , Rfl:         Allergies Sig: Start Victoza 1.8 mg sq qhs   • metFORMIN HCl 500 MG Oral Tab 180 tablet 2     Sig: Take 1 tablet (500 mg total) by mouth 2 (two) times daily with meals.    • ergocalciferol 82515 units Oral Cap 12 capsule 3     Sig: Take 1 capsule (50,000 Units to any new symptoms or medications' side effects. FOLLOW-UP: Schedule a follow-up visit in  [address] [place] [address].          Orders This Visit:  Orders Placed This Encounter      Flulaval 0.5 ml 6 mon and older Quad single dose PF (70919)      Meds This Visit:  Requested Pr

## 2019-11-04 ENCOUNTER — OFFICE VISIT (OUTPATIENT)
Dept: FAMILY MEDICINE CLINIC | Facility: CLINIC | Age: 44
End: 2019-11-04
Payer: COMMERCIAL

## 2019-11-04 VITALS
SYSTOLIC BLOOD PRESSURE: 127 MMHG | BODY MASS INDEX: 41.11 KG/M2 | DIASTOLIC BLOOD PRESSURE: 85 MMHG | HEIGHT: 64 IN | HEART RATE: 87 BPM | WEIGHT: 240.81 LBS | TEMPERATURE: 99 F

## 2019-11-04 DIAGNOSIS — Z00.00 PHYSICAL EXAM: Primary | ICD-10-CM

## 2019-11-04 PROCEDURE — 99396 PREV VISIT EST AGE 40-64: CPT | Performed by: FAMILY MEDICINE

## 2019-11-04 NOTE — PROGRESS NOTES
11/4/2019  8:55 AM    Eva Munoz is a 40year old female. Chief complaint(s): Patient presents with:  Physical: w/ pap    HPI:     Chaz Artist primary complaint is regarding CPE.      Leonel Cantu 12/04/2017  11/02/2018  10/22/2019      FLUZONE 3 Yrs+ Quad Prsv Free 0.5 ml (57365)                          10/01/2016  12/04/2017  11/02/2018      Flulaval, 3 Years & >, IM                          10/16/2007  09/28/2010      Fluvirin, 3 Years & >, Im loss, nosebleeds and tinnitus. Eyes: Negative for visual disturbance. Respiratory: Negative for apnea, shortness of breath and wheezing. Cardiovascular: Negative for chest pain, palpitations and leg swelling.    Gastrointestinal: Negative for heartb external genitalia, without lesions or condyloma. There is no vaginal discharge , cervix with no motion tenderness. Adnexal with no masses or tenderness uterine normal appropriate size without tenderness.   + light vaginal  bleeding     Musculoskeletal: competence & responsibilities: Recommendations on physical activity; exercise daily or at least 3 times a week for 30-60 minutes doing cardiovascular exercise. Patient educated on self breast examination to be done on a monthly basis.   Consider a personal

## 2019-11-14 RX ORDER — ERGOCALCIFEROL 1.25 MG/1
CAPSULE ORAL
Qty: 4 CAPSULE | Refills: 14 | OUTPATIENT
Start: 2019-11-14

## 2019-11-16 ENCOUNTER — TELEPHONE (OUTPATIENT)
Dept: FAMILY MEDICINE CLINIC | Facility: CLINIC | Age: 44
End: 2019-11-16

## 2019-11-16 NOTE — TELEPHONE ENCOUNTER
Per pharmacy PA is needed for the following medication.  Please advise       Liraglutide (VICTOZA) 18 MG/3ML Subcutaneous Solution Pen-injector, Start Victoza 1.8 mg sq qhs, Disp: 6 pen, Rfl: 2    Key: [id number]  Patient Last Name: Dann Robles  : 02/10/1975

## 2019-11-19 RX ORDER — ERGOCALCIFEROL 1.25 MG/1
CAPSULE ORAL
Qty: 12 CAPSULE | Refills: 3 | Status: SHIPPED | OUTPATIENT
Start: 2019-11-19 | End: 2020-02-17

## 2019-11-20 NOTE — TELEPHONE ENCOUNTER
Purvi Dejesus from pharmacy informed of message below. Advised if PA is being requested to contact help desk. Message from Plan  This medication or product is on your plan's list of covered drugs. Prior authorization is not required at this time. If your pharmacy has questions regarding the processing of your prescription, please have them call the [facility] pharmacy help desk at [phone]. **Please note: Formulary lowering, tiering exception, cost reduction and prospective Medicare hospice reviews cannot be requested using this method of submission. Please contact us at [phone] instead.

## 2019-12-04 NOTE — TELEPHONE ENCOUNTER
Liraglutide (VICTOZA) 18 MG/3ML Subcutaneous Solution Pen-injector, Start Victoza 1.8 mg sq qhs, Disp: 6 pen, Rfl: 2

## 2019-12-06 NOTE — TELEPHONE ENCOUNTER
Diabetes Medications  Protocol Criteria: Criteria not met; labs over 13 months old. Please advise.    · Appointment scheduled in the past 6 months or the next 3 months  · A1C < 7.5 in the past 6 months  · Creatinine in the past 12 months  · Creatinine resul

## 2019-12-11 ENCOUNTER — TELEPHONE (OUTPATIENT)
Dept: FAMILY MEDICINE CLINIC | Facility: CLINIC | Age: 44
End: 2019-12-11

## 2019-12-17 ENCOUNTER — TELEPHONE (OUTPATIENT)
Dept: FAMILY MEDICINE CLINIC | Facility: CLINIC | Age: 44
End: 2019-12-17

## 2019-12-17 NOTE — TELEPHONE ENCOUNTER
Per pharmacy PA is needed for the following medication.     •  Liraglutide (VICTOZA) 18 MG/3ML Subcutaneous Solution Pen-injector, Start Victoza 1.8 mg sq qhs, Disp: 6 pen, Rfl: 2    Key: [id number]  Patient Last Name: Ramandeep Akers  : [id number]

## 2019-12-19 NOTE — TELEPHONE ENCOUNTER
PA for Liraglutide (VICTOZA) 18 MG/3ML Subcutaneous Solution completed with [facility] via CMM response time 24-72 hours KEY [id number].

## 2019-12-23 ENCOUNTER — OFFICE VISIT (OUTPATIENT)
Dept: FAMILY MEDICINE CLINIC | Facility: CLINIC | Age: 44
End: 2019-12-23
Payer: COMMERCIAL

## 2019-12-23 VITALS
DIASTOLIC BLOOD PRESSURE: 79 MMHG | TEMPERATURE: 98 F | SYSTOLIC BLOOD PRESSURE: 135 MMHG | OXYGEN SATURATION: 99 % | WEIGHT: 251 LBS | BODY MASS INDEX: 42.85 KG/M2 | HEART RATE: 86 BPM | HEIGHT: 64 IN

## 2019-12-23 DIAGNOSIS — J98.8 RESPIRATORY TRACT INFECTION: Primary | ICD-10-CM

## 2019-12-23 PROCEDURE — 99213 OFFICE O/P EST LOW 20 MIN: CPT | Performed by: FAMILY MEDICINE

## 2019-12-23 PROCEDURE — 99212 OFFICE O/P EST SF 10 MIN: CPT | Performed by: FAMILY MEDICINE

## 2019-12-23 RX ORDER — AZITHROMYCIN 250 MG/1
TABLET, FILM COATED ORAL
Qty: 6 TABLET | Refills: 0 | Status: SHIPPED | OUTPATIENT
Start: 2019-12-23 | End: 2020-05-21

## 2019-12-23 RX ORDER — GUAIFENESIN, PSEUDOEPHEDRINE HYDROCHLORIDE 600; 60 MG/1; MG/1
1 TABLET, EXTENDED RELEASE ORAL EVERY 12 HOURS
Qty: 20 TABLET | Refills: 0 | Status: SHIPPED
Start: 2019-12-23 | End: 2020-05-21

## 2019-12-23 NOTE — PROGRESS NOTES
Patient presents with:  Cough: c/o nasal congestion, cough, nausea, dizziness and sore throat  Toe Pain: c/o bilat hallux valgus pain    HPI:   Kelby Louise is a 40year old female who presents to clinic with complaints of nasal c Refill: 0  - Pseudoephedrine-guaiFENesin ER (MUCINEX D)  MG Oral Tablet 12 Hr; Take 1 tablet by mouth every 12 (twelve) hours. Dispense: 20 tablet;  Refill: 0    Coleman Wilkes MD  12/23/2019  3:56 PM

## 2019-12-23 NOTE — TELEPHONE ENCOUNTER
Received fax from [employer], this medication is on the product's plan list of covered drugs. Prior authorization is not required at this time.  If your pharmacy has questions regarding the processing of this prescription , please have them call the [facility] ph

## 2020-01-29 RX ORDER — NORETHINDRONE AND ETHINYL ESTRADIOL 7 DAYS X 3
KIT ORAL
Qty: 3 PACKAGE | Refills: 0 | OUTPATIENT
Start: 2020-01-29

## 2020-05-19 ENCOUNTER — NURSE TRIAGE (OUTPATIENT)
Dept: FAMILY MEDICINE CLINIC | Facility: CLINIC | Age: 45
End: 2020-05-19

## 2020-05-19 NOTE — TELEPHONE ENCOUNTER
Action Requested: Summary for Provider     []  Critical Lab, Recommendations Needed  [] Need Additional Advice  []   FYI    []   Need Orders  [] Need Medications Sent to Pharmacy  []  Other     SUMMARY:  Language line #[id number] assisted with the phone call

## 2020-05-21 ENCOUNTER — TELEMEDICINE (OUTPATIENT)
Dept: FAMILY MEDICINE CLINIC | Facility: CLINIC | Age: 45
End: 2020-05-21
Payer: COMMERCIAL

## 2020-05-21 DIAGNOSIS — R10.12 LUQ PAIN: ICD-10-CM

## 2020-05-21 DIAGNOSIS — R11.2 NON-INTRACTABLE VOMITING WITH NAUSEA, UNSPECIFIED VOMITING TYPE: Primary | ICD-10-CM

## 2020-05-21 PROCEDURE — 99443 PHONE E/M BY PHYS 21-30 MIN: CPT | Performed by: FAMILY MEDICINE

## 2020-05-21 RX ORDER — ONDANSETRON 4 MG/1
4 TABLET, FILM COATED ORAL EVERY 8 HOURS PRN
Qty: 20 TABLET | Refills: 2 | Status: SHIPPED | OUTPATIENT
Start: 2020-05-21 | End: 2020-06-29

## 2020-05-21 RX ORDER — PANTOPRAZOLE SODIUM 40 MG/1
40 TABLET, DELAYED RELEASE ORAL
Qty: 90 TABLET | Refills: 2 | Status: SHIPPED | OUTPATIENT
Start: 2020-05-21

## 2020-05-21 NOTE — PROGRESS NOTES
Virtual Telephone Check-In    [address],[place] [address] verbally consents to a Virtual/Telephone Check-In visit on 05/21/20.   Patient has been referred to the [facility] website at [email or web address] to review the yearly Consent to Treat docum Pantoprazole Sodium 40 MG Oral Tab EC, Take 1 tablet (40 mg total) by mouth every morning before breakfast., Disp: 90 tablet, Rfl: 2  •  Norethin-Eth Estrad Triphasic (PIRMELLA 7/7/7) 0.5/0.75/1-35 MG-MCG Oral Tab, Take 1 tablet by mouth once daily. , Disp:

## 2020-06-02 RX ORDER — LEVOTHYROXINE SODIUM 137 UG/1
TABLET ORAL
Qty: 90 TABLET | Refills: 0 | Status: SHIPPED | OUTPATIENT
Start: 2020-06-02 | End: 2021-07-19

## 2020-06-04 ENCOUNTER — NURSE TRIAGE (OUTPATIENT)
Dept: FAMILY MEDICINE CLINIC | Facility: CLINIC | Age: 45
End: 2020-06-04

## 2020-06-04 NOTE — TELEPHONE ENCOUNTER
Continue with medication and follow a diet: No citric juice, no caffeine products, hot spicy foods, no greasy foods, no alcohol.

## 2020-06-04 NOTE — TELEPHONE ENCOUNTER
With Divehi interpretor, patient had a telephone visit with Dr Keri Frey on 5/21/2020. Patient stated that the pantoprazole and the zofran helped for the abdominal pain. Pain went away for about 1.5 week and returned again yesterday.  Had pain all night and

## 2020-06-04 NOTE — TELEPHONE ENCOUNTER
With Faroese interpretor, advised patient of Dr. Josefina Larkin note. Patient verbalized understanding.

## 2020-06-18 ENCOUNTER — TELEPHONE (OUTPATIENT)
Dept: FAMILY MEDICINE CLINIC | Facility: CLINIC | Age: 45
End: 2020-06-18

## 2020-06-18 RX ORDER — OMEPRAZOLE 40 MG/1
40 CAPSULE, DELAYED RELEASE ORAL DAILY
Qty: 90 CAPSULE | Refills: 1 | Status: SHIPPED | OUTPATIENT
Start: 2020-06-18 | End: 2020-09-02

## 2020-06-18 NOTE — TELEPHONE ENCOUNTER
Per pharmacy following medication is not covered by insurance and they would like an alternative sent to pharmacy.       Pantoprazole Sodium 40 MG Oral Tab EC, Take 1 tablet (40 mg total) by mouth every morning before breakfast., Disp: 90 tablet, Rfl: 2

## 2020-06-29 ENCOUNTER — OFFICE VISIT (OUTPATIENT)
Dept: FAMILY MEDICINE CLINIC | Facility: CLINIC | Age: 45
End: 2020-06-29
Payer: COMMERCIAL

## 2020-06-29 VITALS
WEIGHT: 253 LBS | DIASTOLIC BLOOD PRESSURE: 85 MMHG | HEART RATE: 88 BPM | HEIGHT: 64 IN | TEMPERATURE: 99 F | SYSTOLIC BLOOD PRESSURE: 135 MMHG | BODY MASS INDEX: 43.19 KG/M2

## 2020-06-29 DIAGNOSIS — K59.01 SLOW TRANSIT CONSTIPATION: ICD-10-CM

## 2020-06-29 DIAGNOSIS — M79.671 RIGHT FOOT PAIN: ICD-10-CM

## 2020-06-29 DIAGNOSIS — R10.12 LUQ PAIN: Primary | ICD-10-CM

## 2020-06-29 PROCEDURE — 99212 OFFICE O/P EST SF 10 MIN: CPT | Performed by: FAMILY MEDICINE

## 2020-06-29 PROCEDURE — 99214 OFFICE O/P EST MOD 30 MIN: CPT | Performed by: FAMILY MEDICINE

## 2020-06-29 RX ORDER — ERGOCALCIFEROL 1.25 MG/1
50000 CAPSULE ORAL WEEKLY
COMMUNITY
Start: 2020-06-02 | End: 2021-02-28

## 2020-06-29 RX ORDER — POLYETHYLENE GLYCOL 3350 17 G/17G
17 POWDER, FOR SOLUTION ORAL DAILY
Qty: 30 PACKET | Refills: 1 | Status: SHIPPED | OUTPATIENT
Start: 2020-06-29 | End: 2020-12-03

## 2020-06-29 NOTE — PROGRESS NOTES
6/29/2020  4:55 PM    Eva Vásquez is a 39year old female  Chief complaint(s): Patient presents with:  Abdominal Pain: f/u, c/o pain, belching, bloating, nausea and vomitting   Foot Pain: right foot pain  Knee Pain: left knee jeb 0.0 standard drinks    Drug use: No      Comment: No history of illicit substance abuse       Immunizations:     Immunization History  Administered            Date(s) Administered    FLULAVAL 6 months & older 0.5 ml Prefilled syringe (47625) tablet (5 mg total) by mouth once daily. 90 tablet 1       Allergies:  No Known Allergies      ROS:   Review of Systems   Constitutional: Negative for chills, fatigue and fever. Respiratory: Negative for shortness of breath.     Cardiovascular: Negative f Signed Prescriptions Disp Refills   •           • Polyethylene Glycol 3350 (MIRALAX) 17 g Oral Powd Pack 30 packet 1     Sig: Take 17 g by mouth daily. RECOMMENDATIONS given include: Please, call our office with any questions or concerns.  Notify

## 2020-06-30 ENCOUNTER — TELEPHONE (OUTPATIENT)
Dept: FAMILY MEDICINE CLINIC | Facility: CLINIC | Age: 45
End: 2020-06-30

## 2020-06-30 RX ORDER — LEVOTHYROXINE SODIUM 137 UG/1
137 TABLET ORAL DAILY
Qty: 90 TABLET | Refills: 3 | Status: SHIPPED | OUTPATIENT
Start: 2020-06-30 | End: 2020-12-03

## 2020-06-30 NOTE — TELEPHONE ENCOUNTER
Patient states that she phoned her insurance and was told that her insurance does cover the levothyroxine. Pt would like to know if a script can be sent to the pharmacy. Pharmacy: [facility] (listed) .

## 2020-07-02 RX ORDER — AMLODIPINE BESYLATE 5 MG/1
TABLET ORAL
Qty: 90 TABLET | Refills: 0 | Status: SHIPPED | OUTPATIENT
Start: 2020-07-02 | End: 2020-09-29

## 2020-08-31 ENCOUNTER — TELEPHONE (OUTPATIENT)
Dept: FAMILY MEDICINE CLINIC | Facility: CLINIC | Age: 45
End: 2020-08-31

## 2020-08-31 NOTE — TELEPHONE ENCOUNTER
With Language Line  Demetrius Rizzo ID# [id number]    Identified  patient's name and     Patient calling reports continues to have \" stomach pain \" LOV 2020    Reports feeling very tired, sleeping \" a lot more \" headache, nasal/ sinus  pressure

## 2020-08-31 NOTE — TELEPHONE ENCOUNTER
Spoke with the patient and scheduled her an appointment for 9/2/20. Patient was advised to wear a mask to the visit and that her temperature will be checked upon arrival to the site. Patient voiced understanding.

## 2020-09-02 ENCOUNTER — APPOINTMENT (OUTPATIENT)
Dept: LAB | Age: 45
End: 2020-09-02
Attending: FAMILY MEDICINE
Payer: COMMERCIAL

## 2020-09-02 ENCOUNTER — OFFICE VISIT (OUTPATIENT)
Dept: FAMILY MEDICINE CLINIC | Facility: CLINIC | Age: 45
End: 2020-09-02
Payer: COMMERCIAL

## 2020-09-02 VITALS
HEIGHT: 64 IN | SYSTOLIC BLOOD PRESSURE: 140 MMHG | TEMPERATURE: 98 F | HEART RATE: 94 BPM | BODY MASS INDEX: 43.09 KG/M2 | WEIGHT: 252.38 LBS | DIASTOLIC BLOOD PRESSURE: 78 MMHG

## 2020-09-02 DIAGNOSIS — R53.83 FATIGUE, UNSPECIFIED TYPE: Primary | ICD-10-CM

## 2020-09-02 DIAGNOSIS — E03.9 ACQUIRED HYPOTHYROIDISM: ICD-10-CM

## 2020-09-02 LAB — TSI SER-ACNC: 3.48 MIU/ML (ref 0.36–3.74)

## 2020-09-02 PROCEDURE — 3078F DIAST BP <80 MM HG: CPT | Performed by: FAMILY MEDICINE

## 2020-09-02 PROCEDURE — 99213 OFFICE O/P EST LOW 20 MIN: CPT | Performed by: FAMILY MEDICINE

## 2020-09-02 PROCEDURE — 3077F SYST BP >= 140 MM HG: CPT | Performed by: FAMILY MEDICINE

## 2020-09-02 PROCEDURE — 36415 COLL VENOUS BLD VENIPUNCTURE: CPT | Performed by: FAMILY MEDICINE

## 2020-09-02 PROCEDURE — 99212 OFFICE O/P EST SF 10 MIN: CPT | Performed by: FAMILY MEDICINE

## 2020-09-02 PROCEDURE — 84443 ASSAY THYROID STIM HORMONE: CPT | Performed by: FAMILY MEDICINE

## 2020-09-02 PROCEDURE — 3008F BODY MASS INDEX DOCD: CPT | Performed by: FAMILY MEDICINE

## 2020-09-02 RX ORDER — LEVOTHYROXINE SODIUM 137 UG/1
137 TABLET ORAL
Qty: 30 TABLET | Refills: 1 | Status: SHIPPED | OUTPATIENT
Start: 2020-09-02 | End: 2020-12-03

## 2020-09-02 NOTE — PROGRESS NOTES
9/2/2020  9:44 AM    Eva Albright Parent is a 39year old female.     Chief complaint(s): Patient presents with:  Fatigue: c/o feeling tired, sleepy, sugars and BP have been normal   Abdominal Pain    HPI:     Mode Camacho Flulaval, 3 Years & >, IM                          10/16/2007  09/28/2010      Fluvirin, 3 Years & >, Im                          12/13/2005 11/02/2006 11/06/2012      Influenza             11/11/2008  10/10/2009  09/23/2014 for back pain. Neurological: Positive for headaches. Psychiatric/Behavioral: Positive for sleep disturbance (more than usual). Negative for depressed mood.        PHYSICAL EXAM:   Physical Exam    Constitutional: She appears well-developed and well-nour

## 2020-09-28 ENCOUNTER — TELEPHONE (OUTPATIENT)
Dept: FAMILY MEDICINE CLINIC | Facility: CLINIC | Age: 45
End: 2020-09-28

## 2020-09-28 NOTE — TELEPHONE ENCOUNTER
Mosotho speaking - pt would like to discuss Rx Pirmella. Pt stts she had an issue with missing her medication and then taking late.  Please advise         Current Outpatient Medications   Medication Sig Dispense Refill       1       0       0       3

## 2020-09-29 RX ORDER — AMLODIPINE BESYLATE 5 MG/1
TABLET ORAL
Qty: 90 TABLET | Refills: 0 | Status: SHIPPED | OUTPATIENT
Start: 2020-09-29 | End: 2021-01-30

## 2020-09-29 NOTE — TELEPHONE ENCOUNTER
Dr. Noemi Merchant:    Patient would like to know if she should continue same OCP or change? Has been on Triphasic Pirmella for 10 years. This month she has not missed a pill and has been having breakthrough spotting. Yesterday started with menses.

## 2020-09-29 NOTE — TELEPHONE ENCOUNTER
Phone call made, no answer. Have her stop BCPs for 3 months then restart. Use condoms for contraception in the mean time.

## 2020-09-29 NOTE — TELEPHONE ENCOUNTER
Patient calling back--before call could be transferred, call disconnected    Language Line # [id number]    Spoke with patient ( verified) and relayed message below--patient verbalizes understanding and agreement. No further questions/concerns at this time.

## 2020-10-05 RX ORDER — CYCLOBENZAPRINE HCL 10 MG
10 TABLET ORAL 3 TIMES DAILY
Qty: 30 TABLET | Refills: 0 | Status: SHIPPED | OUTPATIENT
Start: 2020-10-05 | End: 2020-10-25

## 2020-12-03 ENCOUNTER — VIRTUAL PHONE E/M (OUTPATIENT)
Dept: FAMILY MEDICINE CLINIC | Facility: CLINIC | Age: 45
End: 2020-12-03
Payer: COMMERCIAL

## 2020-12-03 DIAGNOSIS — N92.6 IRREGULAR MENSTRUAL CYCLE: Primary | ICD-10-CM

## 2020-12-03 DIAGNOSIS — E28.2 POLYCYSTIC DISEASE, OVARIES: ICD-10-CM

## 2020-12-03 PROCEDURE — 99213 OFFICE O/P EST LOW 20 MIN: CPT | Performed by: FAMILY MEDICINE

## 2020-12-03 NOTE — PROGRESS NOTES
Virtual Telephone Check-In    [address] verbally consents to a Virtual/Telephone Check-In visit on 12/03/20.   Patient has been referred to the [facility] website at [email or web address] to review the yearly Consent to Treat docum

## 2020-12-16 ENCOUNTER — VIRTUAL PHONE E/M (OUTPATIENT)
Dept: FAMILY MEDICINE CLINIC | Facility: CLINIC | Age: 45
End: 2020-12-16
Payer: COMMERCIAL

## 2020-12-16 DIAGNOSIS — J30.9 ALLERGIC RHINITIS, UNSPECIFIED SEASONALITY, UNSPECIFIED TRIGGER: Primary | ICD-10-CM

## 2020-12-16 PROCEDURE — 99213 OFFICE O/P EST LOW 20 MIN: CPT | Performed by: FAMILY MEDICINE

## 2020-12-16 RX ORDER — LORATADINE AND PSEUDOEPHEDRINE SULFATE 5; 120 MG/1; MG/1
1 TABLET, EXTENDED RELEASE ORAL 2 TIMES DAILY
Qty: 20 TABLET | Refills: 0 | Status: SHIPPED | OUTPATIENT
Start: 2020-12-16

## 2020-12-16 RX ORDER — FLUTICASONE PROPIONATE 50 MCG
2 SPRAY, SUSPENSION (ML) NASAL DAILY
Qty: 1 INHALER | Refills: 3 | Status: SHIPPED | OUTPATIENT
Start: 2020-12-16

## 2020-12-16 NOTE — PROGRESS NOTES
Virtual Telephone Check-In    [address] verbally consents to a Virtual/Telephone Check-In visit on 12/16/20.   Patient has been referred to the [facility] website at [email or web address] to review the yearly Consent to Treat docum Avery Mcneal MD

## 2020-12-17 ENCOUNTER — TELEPHONE (OUTPATIENT)
Dept: FAMILY MEDICINE CLINIC | Facility: CLINIC | Age: 45
End: 2020-12-17

## 2020-12-17 DIAGNOSIS — Z20.822 EXPOSURE TO COVID-19 VIRUS: Primary | ICD-10-CM

## 2020-12-17 NOTE — TELEPHONE ENCOUNTER
Patient states both her  and son tested positive for COVID and she never got sick. Patient would like to know if she is able to get the antibodies test to see if she had COVID with no symptoms and to get her medical information updated.

## 2020-12-19 ENCOUNTER — LAB ENCOUNTER (OUTPATIENT)
Dept: LAB | Age: 45
End: 2020-12-19
Attending: FAMILY MEDICINE
Payer: COMMERCIAL

## 2020-12-19 DIAGNOSIS — Z20.822 EXPOSURE TO COVID-19 VIRUS: ICD-10-CM

## 2020-12-19 PROCEDURE — 36415 COLL VENOUS BLD VENIPUNCTURE: CPT

## 2020-12-19 PROCEDURE — 86769 SARS-COV-2 COVID-19 ANTIBODY: CPT

## 2021-01-30 RX ORDER — AMLODIPINE BESYLATE 5 MG/1
TABLET ORAL
Qty: 90 TABLET | Refills: 0 | Status: SHIPPED | OUTPATIENT
Start: 2021-01-30 | End: 2021-04-18

## 2021-02-28 RX ORDER — ERGOCALCIFEROL 1.25 MG/1
CAPSULE ORAL
Qty: 4 CAPSULE | Refills: 0 | Status: SHIPPED | OUTPATIENT
Start: 2021-02-28 | End: 2021-04-18

## 2021-04-18 RX ORDER — ERGOCALCIFEROL 1.25 MG/1
CAPSULE ORAL
Qty: 4 CAPSULE | Refills: 0 | Status: SHIPPED | OUTPATIENT
Start: 2021-04-18 | End: 2021-05-20

## 2021-04-18 RX ORDER — AMLODIPINE BESYLATE 5 MG/1
TABLET ORAL
Qty: 90 TABLET | Refills: 0 | Status: SHIPPED | OUTPATIENT
Start: 2021-04-18 | End: 2021-07-19

## 2021-04-22 ENCOUNTER — OFFICE VISIT (OUTPATIENT)
Dept: FAMILY MEDICINE CLINIC | Facility: CLINIC | Age: 46
End: 2021-04-22
Payer: COMMERCIAL

## 2021-04-22 ENCOUNTER — LAB ENCOUNTER (OUTPATIENT)
Dept: LAB | Age: 46
End: 2021-04-22
Attending: FAMILY MEDICINE
Payer: COMMERCIAL

## 2021-04-22 VITALS
DIASTOLIC BLOOD PRESSURE: 80 MMHG | BODY MASS INDEX: 41.66 KG/M2 | SYSTOLIC BLOOD PRESSURE: 138 MMHG | HEART RATE: 89 BPM | WEIGHT: 244 LBS | HEIGHT: 64 IN

## 2021-04-22 DIAGNOSIS — G56.01 CARPAL TUNNEL SYNDROME OF RIGHT WRIST: ICD-10-CM

## 2021-04-22 DIAGNOSIS — R42 DIZZINESS: Primary | ICD-10-CM

## 2021-04-22 DIAGNOSIS — R73.03 PREDIABETES: ICD-10-CM

## 2021-04-22 DIAGNOSIS — M54.31 SCIATICA OF RIGHT SIDE: ICD-10-CM

## 2021-04-22 DIAGNOSIS — E03.9 ACQUIRED HYPOTHYROIDISM: ICD-10-CM

## 2021-04-22 DIAGNOSIS — E28.2 POLYCYSTIC DISEASE, OVARIES: ICD-10-CM

## 2021-04-22 PROCEDURE — L3908 WHO COCK-UP NONMOLDE PRE OTS: HCPCS | Performed by: FAMILY MEDICINE

## 2021-04-22 PROCEDURE — 36415 COLL VENOUS BLD VENIPUNCTURE: CPT | Performed by: FAMILY MEDICINE

## 2021-04-22 PROCEDURE — 3008F BODY MASS INDEX DOCD: CPT | Performed by: FAMILY MEDICINE

## 2021-04-22 PROCEDURE — 3079F DIAST BP 80-89 MM HG: CPT | Performed by: FAMILY MEDICINE

## 2021-04-22 PROCEDURE — 3075F SYST BP GE 130 - 139MM HG: CPT | Performed by: FAMILY MEDICINE

## 2021-04-22 PROCEDURE — 83036 HEMOGLOBIN GLYCOSYLATED A1C: CPT | Performed by: FAMILY MEDICINE

## 2021-04-22 PROCEDURE — 99214 OFFICE O/P EST MOD 30 MIN: CPT | Performed by: FAMILY MEDICINE

## 2021-04-22 PROCEDURE — 84443 ASSAY THYROID STIM HORMONE: CPT | Performed by: FAMILY MEDICINE

## 2021-04-22 RX ORDER — METAXALONE 800 MG/1
800 TABLET ORAL 3 TIMES DAILY
Qty: 30 TABLET | Refills: 1 | Status: SHIPPED | OUTPATIENT
Start: 2021-04-22 | End: 2021-05-02

## 2021-04-22 RX ORDER — NORETHINDRONE AND ETHINYL ESTRADIOL 7 DAYS X 3
KIT ORAL
COMMUNITY
Start: 2021-04-20 | End: 2021-10-23

## 2021-04-22 NOTE — PROGRESS NOTES
4/22/2021  1:17 PM    Eva Mcgregor is a 55year old female.     Chief complaint(s): Patient presents with:  Dizziness: c/o on and off dizziness   Lesion: states that she gets lesions on her vaginal area before her menses but they g has a long history of carpal tunnel syndrome.     HISTORY:  Past Medical History:   Diagnosis Date   • Hypothyroidism    • Polycystic ovarian disease    • Rheumatic fever       Past Surgical History:   Procedure Laterality Date   • APPENDECTOMY        Famil Oral Tab Take 1 tablet by mouth once daily 90 tablet 0   • ERGOCALCIFEROL 1.25 MG (41164 UT) Oral Cap Take 1 capsule by mouth once a week 4 capsule 0   • EUTHYROX 137 MCG Oral Tab TAKE 1 TABLET BY MOUTH ONCE DAILY BEFORE BREAKFAST 90 tablet 0   • Pantopraz regular rhythm. Pulmonary:      Effort: Pulmonary effort is normal.      Breath sounds: Normal breath sounds. Musculoskeletal:      Cervical back: Neck supple. Skin:     Findings: No rash.    Neurological:      Comments: Negative Tinel's  Sign  + Phal Also, inform the doctor with any new symptoms or medications' side effects. FOLLOW-UP: Schedule a follow-up visit in 4 months. 5. Sciatica of right side    Weight loss plan  Rx Skelaxin  OTC NSAIDs prn  Avoid heavy lifting  Follow up KPA    6.  Ca

## 2021-05-20 RX ORDER — ERGOCALCIFEROL 1.25 MG/1
CAPSULE ORAL
Qty: 4 CAPSULE | Refills: 0 | Status: SHIPPED | OUTPATIENT
Start: 2021-05-20 | End: 2021-07-20

## 2021-06-16 ENCOUNTER — TELEPHONE (OUTPATIENT)
Dept: FAMILY MEDICINE CLINIC | Facility: CLINIC | Age: 46
End: 2021-06-16

## 2021-06-16 NOTE — TELEPHONE ENCOUNTER
Patient is requesting pain medication for her knee. She is scheduled for 6/24 for appointment, but wants stronger pain medication to help in the mean time. I offered a sooner appointment with another provider but she declined. Please call back.  Kinyarwanda

## 2021-06-16 NOTE — TELEPHONE ENCOUNTER
With Danay Borrero Dr  Tom ID# [id number], identified patient's name &   Action Requested: Summary for Provider     []  Critical Lab, Recommendations Needed  [] Need Additional Advice  []   FYI    []   Need Orders  [] Need Medications Sent to Pharm

## 2021-06-18 ENCOUNTER — OFFICE VISIT (OUTPATIENT)
Dept: FAMILY MEDICINE CLINIC | Facility: CLINIC | Age: 46
End: 2021-06-18
Payer: COMMERCIAL

## 2021-06-18 VITALS
TEMPERATURE: 98 F | SYSTOLIC BLOOD PRESSURE: 103 MMHG | BODY MASS INDEX: 40.63 KG/M2 | HEIGHT: 64 IN | WEIGHT: 238 LBS | HEART RATE: 70 BPM | DIASTOLIC BLOOD PRESSURE: 67 MMHG

## 2021-06-18 DIAGNOSIS — M54.32 LEFT SIDED SCIATICA: Primary | ICD-10-CM

## 2021-06-18 DIAGNOSIS — M17.12 PRIMARY OSTEOARTHRITIS OF LEFT KNEE: ICD-10-CM

## 2021-06-18 PROCEDURE — 3074F SYST BP LT 130 MM HG: CPT | Performed by: FAMILY MEDICINE

## 2021-06-18 PROCEDURE — 99214 OFFICE O/P EST MOD 30 MIN: CPT | Performed by: FAMILY MEDICINE

## 2021-06-18 PROCEDURE — 3078F DIAST BP <80 MM HG: CPT | Performed by: FAMILY MEDICINE

## 2021-06-18 PROCEDURE — 3008F BODY MASS INDEX DOCD: CPT | Performed by: FAMILY MEDICINE

## 2021-06-18 RX ORDER — METHOCARBAMOL 500 MG/1
500 TABLET, FILM COATED ORAL 2 TIMES DAILY PRN
Qty: 30 TABLET | Refills: 0 | Status: SHIPPED | OUTPATIENT
Start: 2021-06-18 | End: 2021-10-23

## 2021-06-18 NOTE — PROGRESS NOTES
Patient presents with:  Leg Pain: c/o left leg pain and paraesthesias    HPI:   Geovani Trotter is a 55year old female who presents to clinic with complaints of worsening L leg pain for 1 week and chronic L knee pain.   Only new acti needed (NO USE ANTES DE MANEJAR/ Robin Mode). Dispense: 30 tablet; Refill: 0  - PHYSIATRY - INTERNAL    2. Primary osteoarthritis of left knee  - Diclofenac Sodium 50 MG Oral Tab EC;  Take 1 tablet (50 mg total) by mouth 3 (three) times daily as neede

## 2021-06-28 ENCOUNTER — OFFICE VISIT (OUTPATIENT)
Dept: FAMILY MEDICINE CLINIC | Facility: CLINIC | Age: 46
End: 2021-06-28
Payer: COMMERCIAL

## 2021-06-28 VITALS
SYSTOLIC BLOOD PRESSURE: 124 MMHG | BODY MASS INDEX: 41.32 KG/M2 | HEIGHT: 64 IN | WEIGHT: 242 LBS | HEART RATE: 75 BPM | DIASTOLIC BLOOD PRESSURE: 80 MMHG

## 2021-06-28 DIAGNOSIS — M54.32 SCIATICA OF LEFT SIDE: Primary | ICD-10-CM

## 2021-06-28 DIAGNOSIS — N92.6 IRREGULAR MENSTRUAL CYCLE: ICD-10-CM

## 2021-06-28 PROCEDURE — 3074F SYST BP LT 130 MM HG: CPT | Performed by: FAMILY MEDICINE

## 2021-06-28 PROCEDURE — 99213 OFFICE O/P EST LOW 20 MIN: CPT | Performed by: FAMILY MEDICINE

## 2021-06-28 PROCEDURE — 3079F DIAST BP 80-89 MM HG: CPT | Performed by: FAMILY MEDICINE

## 2021-06-28 PROCEDURE — 3008F BODY MASS INDEX DOCD: CPT | Performed by: FAMILY MEDICINE

## 2021-06-28 NOTE — PROGRESS NOTES
6/28/2021  10:55 AM    Evaantonio Lopez is a 55year old female.     Chief complaint(s): Patient presents with:  Sciatica: left upper quad that radiates to  foot   Menstrual Problem: 2 periods in 1 month   Blood Sugar: c/o blood sugars Smokeless tobacco: Never Used    Vaping Use      Vaping Use: Never used    Alcohol use: No      Alcohol/week: 0.0 standard drinks    Drug use: No      Comment: No history of illicit substance abuse       Immunizations:     Immunization History  Administere Oral Tab TAKE 1 TABLET BY MOUTH ONCE DAILY BEFORE BREAKFAST 90 tablet 0   • Pantoprazole Sodium 40 MG Oral Tab EC Take 1 tablet (40 mg total) by mouth every morning before breakfast. 90 tablet 2   • Diclofenac Sodium 1 % Transdermal Gel Apply 4 g topically include: Patient was reassured of  her medical condition and all questions and concerns were answered. Patient was informed to please, call our office with any new or further questions or concerns that may come up in the near future.  Notify Dr Nazario Christie or

## 2021-07-19 ENCOUNTER — OFFICE VISIT (OUTPATIENT)
Dept: FAMILY MEDICINE CLINIC | Facility: CLINIC | Age: 46
End: 2021-07-19
Payer: COMMERCIAL

## 2021-07-19 VITALS
WEIGHT: 246 LBS | HEIGHT: 64 IN | SYSTOLIC BLOOD PRESSURE: 124 MMHG | BODY MASS INDEX: 42 KG/M2 | HEART RATE: 84 BPM | DIASTOLIC BLOOD PRESSURE: 77 MMHG

## 2021-07-19 DIAGNOSIS — E66.01 CLASS 3 SEVERE OBESITY DUE TO EXCESS CALORIES WITHOUT SERIOUS COMORBIDITY WITH BODY MASS INDEX (BMI) OF 40.0 TO 44.9 IN ADULT (HCC): ICD-10-CM

## 2021-07-19 DIAGNOSIS — E28.2 POLYCYSTIC OVARIAN DISEASE: ICD-10-CM

## 2021-07-19 DIAGNOSIS — N92.6 IRREGULAR MENSTRUAL CYCLE: Primary | ICD-10-CM

## 2021-07-19 DIAGNOSIS — Z12.31 VISIT FOR SCREENING MAMMOGRAM: ICD-10-CM

## 2021-07-19 PROCEDURE — 3074F SYST BP LT 130 MM HG: CPT | Performed by: FAMILY MEDICINE

## 2021-07-19 PROCEDURE — 99213 OFFICE O/P EST LOW 20 MIN: CPT | Performed by: FAMILY MEDICINE

## 2021-07-19 PROCEDURE — 3008F BODY MASS INDEX DOCD: CPT | Performed by: FAMILY MEDICINE

## 2021-07-19 PROCEDURE — 3078F DIAST BP <80 MM HG: CPT | Performed by: FAMILY MEDICINE

## 2021-07-19 RX ORDER — NALTREXONE HYDROCHLORIDE AND BUPROPION HYDROCHLORIDE 8; 90 MG/1; MG/1
TABLET, EXTENDED RELEASE ORAL
Qty: 70 TABLET | Refills: 0 | Status: SHIPPED | OUTPATIENT
Start: 2021-07-19 | End: 2021-08-26

## 2021-07-19 RX ORDER — LEVOTHYROXINE SODIUM 137 UG/1
137 TABLET ORAL
Qty: 90 TABLET | Refills: 2 | Status: SHIPPED | OUTPATIENT
Start: 2021-07-19

## 2021-07-19 RX ORDER — AMLODIPINE BESYLATE 5 MG/1
5 TABLET ORAL DAILY
Qty: 90 TABLET | Refills: 2 | Status: SHIPPED | OUTPATIENT
Start: 2021-07-19

## 2021-07-19 NOTE — PROGRESS NOTES
7/19/2021  2:47 PM    Eva Vásquez is a 55year old female.     Chief complaint(s): Patient presents with:  Irregular Periods: follow up   Obese   HPI:     Verline Sat primary complaint is regarding as ab Prefilled syringe ([id number])                          12/04/2017  11/02/2018  10/22/2019      FLUZONE 6 months and older PFS 0.5 ml (70923)                          10/01/2016  12/04/2017  11/02/2018      Flulaval, 3 Years & >, IM                          10/ route daily.  1 Inhaler 3   • Pantoprazole Sodium 40 MG Oral Tab EC Take 1 tablet (40 mg total) by mouth every morning before breakfast. 90 tablet 2       Allergies:  No Known Allergies      ROS:   Review of Systems   Constitutional: Positive for unexpected Irregular menstrual cycle  (primary encounter diagnosis)  Polycystic ovarian disease  Class 3 severe obesity due to excess calories without serious comorbidity with body mass index (bmi) of 40.0 to 44.9 in adult (hcc)  Visit for screening mammogram change. FOLLOW-UP: Schedule a follow-up visit in 1 month. Orders This Visit:  No orders of the defined types were placed in this encounter.       Meds This Visit:  Requested Prescriptions     Signed Prescriptions Disp Refills   • Naltrexone-buPRO

## 2021-07-20 RX ORDER — ERGOCALCIFEROL 1.25 MG/1
CAPSULE ORAL
Qty: 4 CAPSULE | Refills: 0 | Status: SHIPPED | OUTPATIENT
Start: 2021-07-20 | End: 2021-08-14

## 2021-08-14 RX ORDER — ERGOCALCIFEROL 1.25 MG/1
CAPSULE ORAL
Qty: 4 CAPSULE | Refills: 0 | Status: SHIPPED | OUTPATIENT
Start: 2021-08-14 | End: 2021-09-27

## 2021-08-26 ENCOUNTER — OFFICE VISIT (OUTPATIENT)
Dept: FAMILY MEDICINE CLINIC | Facility: CLINIC | Age: 46
End: 2021-08-26
Payer: COMMERCIAL

## 2021-08-26 VITALS
HEIGHT: 64 IN | WEIGHT: 243 LBS | SYSTOLIC BLOOD PRESSURE: 132 MMHG | BODY MASS INDEX: 41.48 KG/M2 | HEART RATE: 88 BPM | DIASTOLIC BLOOD PRESSURE: 81 MMHG

## 2021-08-26 DIAGNOSIS — E28.2 POLYCYSTIC OVARIAN DISEASE: Primary | ICD-10-CM

## 2021-08-26 DIAGNOSIS — E66.01 CLASS 3 SEVERE OBESITY DUE TO EXCESS CALORIES WITHOUT SERIOUS COMORBIDITY WITH BODY MASS INDEX (BMI) OF 40.0 TO 44.9 IN ADULT (HCC): ICD-10-CM

## 2021-08-26 DIAGNOSIS — E03.9 ACQUIRED HYPOTHYROIDISM: ICD-10-CM

## 2021-08-26 PROCEDURE — 99213 OFFICE O/P EST LOW 20 MIN: CPT | Performed by: FAMILY MEDICINE

## 2021-08-26 PROCEDURE — 3075F SYST BP GE 130 - 139MM HG: CPT | Performed by: FAMILY MEDICINE

## 2021-08-26 PROCEDURE — 3008F BODY MASS INDEX DOCD: CPT | Performed by: FAMILY MEDICINE

## 2021-08-26 PROCEDURE — 3079F DIAST BP 80-89 MM HG: CPT | Performed by: FAMILY MEDICINE

## 2021-08-26 NOTE — PROGRESS NOTES
8/26/2021  4:24 PM    Eva Milton is a 55year old female. Chief complaint(s): Patient presents with:   Follow - Up: Weight check - only took med for 2 weeks  Follow - Up: Irregular menses     HPI:     Maryellen Voss Grandmother         Type 2      Social History: Social History    Tobacco Use      Smoking status: Never Smoker      Smokeless tobacco: Never Used    Vaping Use      Vaping Use: Never used    Alcohol use: No      Alcohol/week: 0.0 standard drinks    Drug u as needed (NO USE ANTES DE MANROSARIO/ Anusha Pew).  (Patient not taking: Reported on 8/26/2021 ) 30 tablet 0   • [address] 7/7/7 0.5/0.75/1-35 MG-MCG Oral Tab   (Patient not taking: Reported on 8/26/2021 )     • Loratadine-Pseudoephedrine ER (CLARITIN-D 1 [address]a Yu   Results for orders placed or performed in visit on 04/22/21   HEMOGLOBIN A1C   Result Value Ref Range    HgbA1C 6.3 (H) <5.7 %    Estimated Average Glucose 134 (H) 68 - 126 mg/dL   TSH W REFLEX TO FREE T4   Resu Propionate 50 MCG/ACT Nasal Suspension, 2 sprays by Each Nare route daily.  (Patient not taking: Reported on 8/26/2021 ), Disp: 1 Inhaler, Rfl: 3        RECOMMENDATIONS given include: Patient was reassured of  her medical condition and all questions and con

## 2021-09-27 RX ORDER — ERGOCALCIFEROL 1.25 MG/1
CAPSULE ORAL
Qty: 4 CAPSULE | Refills: 0 | Status: SHIPPED | OUTPATIENT
Start: 2021-09-27 | End: 2021-11-18

## 2021-10-11 ENCOUNTER — HOSPITAL ENCOUNTER (OUTPATIENT)
Dept: MAMMOGRAPHY | Age: 46
Discharge: HOME | End: 2021-10-11
Attending: FAMILY MEDICINE
Payer: COMMERCIAL

## 2021-10-11 DIAGNOSIS — Z12.31 VISIT FOR SCREENING MAMMOGRAM: ICD-10-CM

## 2021-10-11 PROCEDURE — 77067 SCR MAMMO BI INCL CAD: CPT | Performed by: FAMILY MEDICINE

## 2021-10-11 PROCEDURE — 77063 BREAST TOMOSYNTHESIS BI: CPT | Performed by: FAMILY MEDICINE

## 2021-10-23 ENCOUNTER — OFFICE VISIT (OUTPATIENT)
Dept: FAMILY MEDICINE CLINIC | Facility: CLINIC | Age: 46
End: 2021-10-23
Payer: COMMERCIAL

## 2021-10-23 ENCOUNTER — LAB ENCOUNTER (OUTPATIENT)
Dept: LAB | Age: 46
End: 2021-10-23
Attending: FAMILY MEDICINE
Payer: COMMERCIAL

## 2021-10-23 VITALS
WEIGHT: 245.19 LBS | HEIGHT: 64 IN | DIASTOLIC BLOOD PRESSURE: 77 MMHG | BODY MASS INDEX: 41.86 KG/M2 | HEART RATE: 83 BPM | SYSTOLIC BLOOD PRESSURE: 136 MMHG

## 2021-10-23 DIAGNOSIS — E66.01 CLASS 3 SEVERE OBESITY DUE TO EXCESS CALORIES WITHOUT SERIOUS COMORBIDITY WITH BODY MASS INDEX (BMI) OF 40.0 TO 44.9 IN ADULT (HCC): ICD-10-CM

## 2021-10-23 DIAGNOSIS — N94.6 MENORRHALGIA: ICD-10-CM

## 2021-10-23 DIAGNOSIS — Z00.00 PHYSICAL EXAM: Primary | ICD-10-CM

## 2021-10-23 PROCEDURE — 83036 HEMOGLOBIN GLYCOSYLATED A1C: CPT | Performed by: FAMILY MEDICINE

## 2021-10-23 PROCEDURE — 81001 URINALYSIS AUTO W/SCOPE: CPT | Performed by: FAMILY MEDICINE

## 2021-10-23 PROCEDURE — 84443 ASSAY THYROID STIM HORMONE: CPT | Performed by: FAMILY MEDICINE

## 2021-10-23 PROCEDURE — 36415 COLL VENOUS BLD VENIPUNCTURE: CPT | Performed by: FAMILY MEDICINE

## 2021-10-23 PROCEDURE — 80053 COMPREHEN METABOLIC PANEL: CPT | Performed by: FAMILY MEDICINE

## 2021-10-23 PROCEDURE — 3075F SYST BP GE 130 - 139MM HG: CPT | Performed by: FAMILY MEDICINE

## 2021-10-23 PROCEDURE — 82306 VITAMIN D 25 HYDROXY: CPT | Performed by: FAMILY MEDICINE

## 2021-10-23 PROCEDURE — 3078F DIAST BP <80 MM HG: CPT | Performed by: FAMILY MEDICINE

## 2021-10-23 PROCEDURE — 90686 IIV4 VACC NO PRSV 0.5 ML IM: CPT | Performed by: FAMILY MEDICINE

## 2021-10-23 PROCEDURE — 3008F BODY MASS INDEX DOCD: CPT | Performed by: FAMILY MEDICINE

## 2021-10-23 PROCEDURE — 99396 PREV VISIT EST AGE 40-64: CPT | Performed by: FAMILY MEDICINE

## 2021-10-23 PROCEDURE — 90471 IMMUNIZATION ADMIN: CPT | Performed by: FAMILY MEDICINE

## 2021-10-23 PROCEDURE — 80061 LIPID PANEL: CPT | Performed by: FAMILY MEDICINE

## 2021-10-23 PROCEDURE — 85025 COMPLETE CBC W/AUTO DIFF WBC: CPT | Performed by: FAMILY MEDICINE

## 2021-10-23 PROCEDURE — 81015 MICROSCOPIC EXAM OF URINE: CPT | Performed by: FAMILY MEDICINE

## 2021-10-23 NOTE — PROGRESS NOTES
10/23/2021  12:17 PM    Eva Margarethlala Berger is a 55year old female. Chief complaint(s): Patient presents with:  Routine Physical    HPI:     [place] primary complaint is regarding CPE.      Eva KERR FLULAVAL 6 months & older 0.5 ml Prefilled syringe (00029)                          12/04/2017  11/02/2018  10/22/2019      FLUZONE 6 months and older PFS 0.5 ml (98042)                          10/01/2016  12/04/2017  11/02/2018      Flulaval, 3 Years & > diaphoresis, fatigue and fever. HENT: Negative for hearing loss and nosebleeds. Eyes: Negative for visual disturbance. Respiratory: Negative for shortness of breath. Cardiovascular: Negative for chest pain and palpitations.    Gastrointestinal: Ne Tenderness: There is no abdominal tenderness. Hernia: No hernia is present. Musculoskeletal:      Cervical back: Neck supple. Comments: Spine without scoliosis or kyphosis.   Range of motions of both upper and lower extremities are normal.   Lym NORMAL MAMMOGRAM DOES NOT EXCLUDE THE POSSIBILITY OF BREAST CANCER. A CLINICALLY SUSPICIOUS PALPABLE LUMP SHOULD BE BIOPSIED. For patients over the age of 36, the target due date for the patient's next mammogram has been entered into a reminder system. exercise daily or at least 3 times a week for 30-60 minutes doing cardiovascular exercise. Patient educated on self breast examination to be done on a monthly basis. Consider a  if over weight and/or having difficult in staying active.  Att

## 2021-11-08 ENCOUNTER — OFFICE VISIT (OUTPATIENT)
Dept: FAMILY MEDICINE CLINIC | Facility: CLINIC | Age: 46
End: 2021-11-08
Payer: COMMERCIAL

## 2021-11-08 VITALS
WEIGHT: 251 LBS | DIASTOLIC BLOOD PRESSURE: 82 MMHG | SYSTOLIC BLOOD PRESSURE: 126 MMHG | BODY MASS INDEX: 42.85 KG/M2 | HEIGHT: 64 IN | HEART RATE: 89 BPM

## 2021-11-08 DIAGNOSIS — M17.0 PRIMARY OSTEOARTHRITIS OF BOTH KNEES: ICD-10-CM

## 2021-11-08 DIAGNOSIS — R20.8 BURNING SENSATION OF MOUTH: Primary | ICD-10-CM

## 2021-11-08 DIAGNOSIS — L02.91 ABSCESS: ICD-10-CM

## 2021-11-08 DIAGNOSIS — N76.0 ACUTE VAGINITIS: ICD-10-CM

## 2021-11-08 PROCEDURE — 3008F BODY MASS INDEX DOCD: CPT | Performed by: FAMILY MEDICINE

## 2021-11-08 PROCEDURE — 3079F DIAST BP 80-89 MM HG: CPT | Performed by: FAMILY MEDICINE

## 2021-11-08 PROCEDURE — 3074F SYST BP LT 130 MM HG: CPT | Performed by: FAMILY MEDICINE

## 2021-11-08 PROCEDURE — 99214 OFFICE O/P EST MOD 30 MIN: CPT | Performed by: FAMILY MEDICINE

## 2021-11-08 RX ORDER — AMOXICILLIN 500 MG/1
500 CAPSULE ORAL 2 TIMES DAILY
Qty: 20 CAPSULE | Refills: 0 | Status: SHIPPED | OUTPATIENT
Start: 2021-11-08 | End: 2021-11-18

## 2021-11-08 RX ORDER — TEA TREE OIL 100 %
OIL (ML) TOPICAL
Qty: 180 CAPSULE | Refills: 3 | Status: SHIPPED | OUTPATIENT
Start: 2021-11-08

## 2021-11-08 NOTE — PROGRESS NOTES
11/8/2021  10:59 AM    Eva Diane Margot Waddell is a 55year old female.     Chief complaint(s): Patient presents with:  Vaginal Problem: c/o vaginal mass, burning and spotting   Other: c/o numbness and small bumps on tongue   Knee Pain: right Alcohol and Other Disorders Associated Paternal Grandfather         Alcoholism   • Diabetes Paternal Grandmother         Type 2      Social History: Social History    Tobacco Use      Smoking status: Never Smoker      Smokeless tobacco: Never Used    Vapin 60 tablet 1   • Levothyroxine Sodium (EUTHYROX) 137 MCG Oral Tab Take 137 mcg by mouth before breakfast. 90 tablet 2   • metFORMIN HCl 500 MG Oral Tab Take 1 tablet (500 mg total) by mouth 2 (two) times daily with meals.  180 tablet 2   • Loratadine-Pseudoe Genitourinary:     Vagina: Vaginal discharge (white) present. Comments: Left labial majora soft mass draining  Small  Exudate   Musculoskeletal:      Cervical back: Neck supple.       Comments: Bilateral knee ++ crepitous, NFROM, no swelling    Skin: received a discharge summary from the technologist after completion of exam.  Breast marker legend used on images  Triangle = Palpable lump Buckland = Skin tag or mole BB = Nipple Linear gallito = Scar Square = Pain    Dictated by (CST): Pallavi Terrazas MD on 10/ vaginally nightly for 3 days. LABORATORY & ORDERS: Orders Placed This Encounter      Genital vaginosis screen    RECOMMENDATIONS given include: Patient was reassured of  her medical condition and all questions and concerns were answered.  Patient w

## 2021-11-18 RX ORDER — ERGOCALCIFEROL 1.25 MG/1
CAPSULE ORAL
Qty: 4 CAPSULE | Refills: 0 | Status: SHIPPED | OUTPATIENT
Start: 2021-11-18 | End: 2021-12-27

## 2021-11-29 ENCOUNTER — HOSPITAL ENCOUNTER (OUTPATIENT)
Dept: ULTRASOUND IMAGING | Age: 46
Discharge: HOME | End: 2021-11-29
Attending: FAMILY MEDICINE
Payer: COMMERCIAL

## 2021-11-29 DIAGNOSIS — N94.6 MENORRHALGIA: ICD-10-CM

## 2021-11-29 PROCEDURE — 76856 US EXAM PELVIC COMPLETE: CPT | Performed by: FAMILY MEDICINE

## 2021-11-29 PROCEDURE — 76830 TRANSVAGINAL US NON-OB: CPT | Performed by: FAMILY MEDICINE

## 2021-12-27 RX ORDER — ERGOCALCIFEROL 1.25 MG/1
CAPSULE ORAL
Qty: 4 CAPSULE | Refills: 0 | Status: SHIPPED | OUTPATIENT
Start: 2021-12-27

## 2022-02-18 RX ORDER — ERGOCALCIFEROL 1.25 MG/1
CAPSULE ORAL
Qty: 4 CAPSULE | Refills: 0 | Status: SHIPPED | OUTPATIENT
Start: 2022-02-18

## 2022-02-18 RX ORDER — PANTOPRAZOLE SODIUM 40 MG/1
TABLET, DELAYED RELEASE ORAL
Qty: 30 TABLET | Refills: 0 | Status: SHIPPED | OUTPATIENT
Start: 2022-02-18

## 2022-02-18 NOTE — TELEPHONE ENCOUNTER
Please review; protocol failed.  Or has no protocol    Requested Prescriptions   Pending Prescriptions Disp Refills    ERGOCALCIFEROL 1.25 MG (10264 UT) Oral Cap [Pharmacy Med Name: Vitamin D (Ergocalciferol) 1.25 MG (47745 UT) Oral Capsule] 4 capsule 0     Sig: Take 1 capsule by mouth once a week        There is no refill protocol information for this order        PANTOPRAZOLE 40 MG Oral Tab EC [Pharmacy Med Name: Pantoprazole Sodium 40 MG Oral Tablet Delayed Release] 30 tablet 0     Sig: TAKE 1 TABLET BY MOUTH ONCE DAILY IN THE MORNING BEFORE BREAKFAST        Gastrointestional Medication Protocol Passed - 2/17/2022  6:03 PM        Passed - Appointment in past 12 or next 3 months               Recent Outpatient Visits              3 months ago Burning sensation of mouth    [facility], [address], Yadiel Talbert MD    Office Visit    3 months ago Physical exam    [address] MD    Office Visit    5 months ago Polycystic ovarian disease    [facility], [address], Adebayo Chu MD    Office Visit    7 months ago Irregular menstrual cycle    [facility], [address], Adebayo Chu MD    Office Visit    7 months ago Sciatica of left side    [facility], Yadiel Charles MD    Office Visit

## 2022-03-03 ENCOUNTER — OFFICE VISIT (OUTPATIENT)
Dept: FAMILY MEDICINE CLINIC | Facility: CLINIC | Age: 47
End: 2022-03-03
Payer: COMMERCIAL

## 2022-03-03 VITALS
DIASTOLIC BLOOD PRESSURE: 81 MMHG | HEART RATE: 86 BPM | BODY MASS INDEX: 43 KG/M2 | SYSTOLIC BLOOD PRESSURE: 133 MMHG | HEIGHT: 64 IN

## 2022-03-03 DIAGNOSIS — N92.6 IRREGULAR MENSTRUAL CYCLE: ICD-10-CM

## 2022-03-03 DIAGNOSIS — E28.2 POLYCYSTIC OVARIAN DISEASE: Primary | ICD-10-CM

## 2022-03-03 DIAGNOSIS — M62.838 MUSCLE SPASM: ICD-10-CM

## 2022-03-03 PROCEDURE — 3079F DIAST BP 80-89 MM HG: CPT | Performed by: FAMILY MEDICINE

## 2022-03-03 PROCEDURE — 99213 OFFICE O/P EST LOW 20 MIN: CPT | Performed by: FAMILY MEDICINE

## 2022-03-03 PROCEDURE — 3075F SYST BP GE 130 - 139MM HG: CPT | Performed by: FAMILY MEDICINE

## 2022-03-03 RX ORDER — ERGOCALCIFEROL 1.25 MG/1
50000 CAPSULE ORAL WEEKLY
Qty: 12 CAPSULE | Refills: 4 | Status: SHIPPED | OUTPATIENT
Start: 2022-03-03 | End: 2022-04-02

## 2022-03-03 RX ORDER — CYCLOBENZAPRINE HCL 10 MG
10 TABLET ORAL 3 TIMES DAILY
Qty: 30 TABLET | Refills: 1 | Status: SHIPPED | OUTPATIENT
Start: 2022-03-03 | End: 2022-03-23

## 2022-03-30 ENCOUNTER — TELEPHONE (OUTPATIENT)
Dept: FAMILY MEDICINE CLINIC | Facility: CLINIC | Age: 47
End: 2022-03-30

## 2022-03-30 RX ORDER — MEDROXYPROGESTERONE ACETATE 10 MG/1
10 TABLET ORAL DAILY
Qty: 10 TABLET | Refills: 1 | Status: SHIPPED | OUTPATIENT
Start: 2022-03-30

## 2022-03-30 NOTE — TELEPHONE ENCOUNTER
Language line [place] ID # [id number], Identified patient's name and . Patient saw Dr. Any Dotson on 3/3/22. She was given medication to shorten her cycle and it is not helping. She has been taking Metformin and her current cycle has lasted 10 days with \"heavy bleeding. \" She has been using 2 or 3 pads a day for 10 days. Dr. Any Dotson, please advise.

## 2022-04-03 RX ORDER — LEVOTHYROXINE SODIUM 137 UG/1
137 TABLET ORAL
Qty: 90 TABLET | Refills: 1 | Status: SHIPPED | OUTPATIENT
Start: 2022-04-03 | End: 2022-06-18

## 2022-04-03 NOTE — TELEPHONE ENCOUNTER
Refill passed per [employer] protocol.      Requested Prescriptions   Pending Prescriptions Disp Refills    EUTHYROX [address] [facility] Oral Tab [Pharmacy Med Name: Euthyrox 137 MCG Oral Tablet] 90 tablet 0     Sig: TAKE 1 TABLET BY MOUTH ONCE DAILY BEFORE BREAKFAST        Thyroid Medication Protocol Passed - 4/3/2022 11:03 AM        Passed - TSH in past 12 months        Passed - Last TSH value is normal     Lab Results   Component Value Date    TSH 2.080 10/23/2021    [facility] 2.63 06/11/2016    TSHT4 2.86 12/07/2019                 Passed - Appointment in past 12 or next 3 months                Recent Outpatient Visits              1 month ago Polycystic ovarian disease    [address] MD    Office Visit    4 months ago Burning sensation of mouth    [address] A[address] Che[address] MD    Office Visit    5 months ago Physical exam    [address] A[address] Che[address] MD    Office Visit    7 months ago Polycystic ovarian disease    [address] MD    Office Visit    8 months ago Irregular menstrual cycle    [facility], [address], Vincenzo Soto MD    Office Visit             Future Appointments         Provider Department Appt Notes    In 1 month Ignacia Simmons MD [facility], Adebayo Charles 4 wks f/u see comm \"policy informed\"    In 2 months Ignacia Simmons MD [facility], [address], [address]

## 2022-04-28 RX ORDER — AMLODIPINE BESYLATE 5 MG/1
5 TABLET ORAL DAILY
Qty: 90 TABLET | Refills: 1 | Status: SHIPPED | OUTPATIENT
Start: 2022-04-28

## 2022-04-28 NOTE — TELEPHONE ENCOUNTER
Refill passed per [facility] protocol. Requested Prescriptions   Pending Prescriptions Disp Refills    amLODIPine 5 MG Oral Tab 90 tablet 1     Sig: Take 1 tablet (5 mg total) by mouth daily.         Hypertensive Medications Protocol Passed - 4/28/2022  2:10 PM        Passed - CMP or BMP in past 12 months        Passed - Appointment in past 6 or next 3 months        Passed - GFR Non- > 50     Lab Results   Component Value Date    GFRNAA 113 10/23/2021                      Recent Outpatient Visits              1 month ago Polycystic ovarian disease    [facility], [address] [facility], Adebayo Farias MD    Office Visit    5 months ago Burning sensation of mouth    [address] MD    Office Visit    6 months ago Physical exam    [address] MD    Office Visit    8 months ago Polycystic ovarian disease    [address] MD    Office Visit    9 months ago Irregular menstrual cycle    [facility], [address], Charlene Jacob MD    Office Visit           Future Appointments         Provider Department Appt Notes    In 1 week Ivan Farias MD [facility], Adebayo Charles 4 wks f/u see comm \"policy informed\"    In 1 month vIan Farias MD [facility], [address], [address] [facility]

## 2022-04-28 NOTE — TELEPHONE ENCOUNTER
Patient is requesting a refill for amLODIPine Besylate 5 MG Oral Tab. Please advise.  Patient is out of medication

## 2022-05-07 ENCOUNTER — LAB ENCOUNTER (OUTPATIENT)
Dept: LAB | Age: 47
End: 2022-05-07
Attending: FAMILY MEDICINE
Payer: COMMERCIAL

## 2022-05-07 ENCOUNTER — HOSPITAL ENCOUNTER (OUTPATIENT)
Dept: GENERAL RADIOLOGY | Age: 47
Discharge: HOME | End: 2022-05-07
Attending: FAMILY MEDICINE
Payer: COMMERCIAL

## 2022-05-07 ENCOUNTER — OFFICE VISIT (OUTPATIENT)
Dept: FAMILY MEDICINE CLINIC | Facility: CLINIC | Age: 47
End: 2022-05-07
Payer: COMMERCIAL

## 2022-05-07 VITALS
BODY MASS INDEX: 42.45 KG/M2 | HEIGHT: 64 IN | HEART RATE: 92 BPM | TEMPERATURE: 97 F | SYSTOLIC BLOOD PRESSURE: 150 MMHG | WEIGHT: 248.63 LBS | DIASTOLIC BLOOD PRESSURE: 90 MMHG

## 2022-05-07 DIAGNOSIS — B34.9 VIRAL SYNDROME: ICD-10-CM

## 2022-05-07 DIAGNOSIS — M17.12 PRIMARY OSTEOARTHRITIS OF LEFT KNEE: Primary | ICD-10-CM

## 2022-05-07 DIAGNOSIS — N94.6 MENORRHALGIA: ICD-10-CM

## 2022-05-07 DIAGNOSIS — R73.9 HYPERGLYCEMIA: ICD-10-CM

## 2022-05-07 DIAGNOSIS — E28.2 POLYCYSTIC OVARIAN DISEASE: ICD-10-CM

## 2022-05-07 DIAGNOSIS — M17.12 PRIMARY OSTEOARTHRITIS OF LEFT KNEE: ICD-10-CM

## 2022-05-07 LAB
CUVETTE LOT #: NORMAL NUMERIC
EST. AVERAGE GLUCOSE BLD GHB EST-MCNC: 134 MG/DL (ref 68–126)
GLUCOSE BLOOD: 94
HBA1C MFR BLD: 6.3 % (ref ?–5.7)
HEMOGLOBIN: 13.3 G/DL (ref 12–15)
TEST STRIP LOT #: NORMAL NUMERIC

## 2022-05-07 PROCEDURE — 3077F SYST BP >= 140 MM HG: CPT | Performed by: FAMILY MEDICINE

## 2022-05-07 PROCEDURE — 99214 OFFICE O/P EST MOD 30 MIN: CPT | Performed by: FAMILY MEDICINE

## 2022-05-07 PROCEDURE — 73560 X-RAY EXAM OF KNEE 1 OR 2: CPT | Performed by: FAMILY MEDICINE

## 2022-05-07 PROCEDURE — 82962 GLUCOSE BLOOD TEST: CPT | Performed by: FAMILY MEDICINE

## 2022-05-07 PROCEDURE — 3044F HG A1C LEVEL LT 7.0%: CPT | Performed by: FAMILY MEDICINE

## 2022-05-07 PROCEDURE — 3080F DIAST BP >= 90 MM HG: CPT | Performed by: FAMILY MEDICINE

## 2022-05-07 PROCEDURE — 83036 HEMOGLOBIN GLYCOSYLATED A1C: CPT | Performed by: FAMILY MEDICINE

## 2022-05-07 PROCEDURE — 85018 HEMOGLOBIN: CPT | Performed by: FAMILY MEDICINE

## 2022-05-07 PROCEDURE — 3008F BODY MASS INDEX DOCD: CPT | Performed by: FAMILY MEDICINE

## 2022-05-07 PROCEDURE — 36415 COLL VENOUS BLD VENIPUNCTURE: CPT | Performed by: FAMILY MEDICINE

## 2022-05-07 RX ORDER — ECHINACEA PURPUREA EXTRACT 125 MG
1 TABLET ORAL 4 TIMES DAILY PRN
Qty: 50 ML | Refills: 1 | Status: SHIPPED | OUTPATIENT
Start: 2022-05-07

## 2022-05-08 LAB — SARS-COV-2 RNA RESP QL NAA+PROBE: NOT DETECTED

## 2022-05-09 ENCOUNTER — TELEPHONE (OUTPATIENT)
Dept: FAMILY MEDICINE CLINIC | Facility: CLINIC | Age: 47
End: 2022-05-09

## 2022-05-09 RX ORDER — BENZONATATE 200 MG/1
200 CAPSULE ORAL 3 TIMES DAILY PRN
Qty: 30 CAPSULE | Refills: 0 | Status: SHIPPED | OUTPATIENT
Start: 2022-05-09

## 2022-05-09 NOTE — TELEPHONE ENCOUNTER
Patient reports had appt on Sat 5/7 and was prescribed nasal spray for symptoms however not improved. Continues with nasal congestion and has been with coughing all night. Is currently also taking Claritin, reports no fever or other symptoms, Covid test was neg. Requesting other treatment, please advise.

## 2022-05-16 ENCOUNTER — LAB ENCOUNTER (OUTPATIENT)
Dept: LAB | Age: 47
End: 2022-05-16
Attending: FAMILY MEDICINE
Payer: COMMERCIAL

## 2022-05-16 ENCOUNTER — OFFICE VISIT (OUTPATIENT)
Dept: FAMILY MEDICINE CLINIC | Facility: CLINIC | Age: 47
End: 2022-05-16
Payer: COMMERCIAL

## 2022-05-16 VITALS
WEIGHT: 246.63 LBS | HEART RATE: 97 BPM | SYSTOLIC BLOOD PRESSURE: 131 MMHG | HEIGHT: 64 IN | BODY MASS INDEX: 42.11 KG/M2 | DIASTOLIC BLOOD PRESSURE: 83 MMHG

## 2022-05-16 DIAGNOSIS — Z12.11 COLON CANCER SCREENING: ICD-10-CM

## 2022-05-16 DIAGNOSIS — K21.9 GASTROESOPHAGEAL REFLUX DISEASE WITHOUT ESOPHAGITIS: Primary | ICD-10-CM

## 2022-05-16 DIAGNOSIS — Z86.19 HISTORY OF HELICOBACTER PYLORI INFECTION: ICD-10-CM

## 2022-05-16 PROCEDURE — 3008F BODY MASS INDEX DOCD: CPT | Performed by: FAMILY MEDICINE

## 2022-05-16 PROCEDURE — 3079F DIAST BP 80-89 MM HG: CPT | Performed by: FAMILY MEDICINE

## 2022-05-16 PROCEDURE — 83013 H PYLORI (C-13) BREATH: CPT | Performed by: FAMILY MEDICINE

## 2022-05-16 PROCEDURE — 3075F SYST BP GE 130 - 139MM HG: CPT | Performed by: FAMILY MEDICINE

## 2022-05-16 PROCEDURE — 99213 OFFICE O/P EST LOW 20 MIN: CPT | Performed by: FAMILY MEDICINE

## 2022-05-16 RX ORDER — SIMETHICONE 125 MG
250 TABLET,CHEWABLE ORAL EVERY 6 HOURS PRN
Qty: 60 TABLET | Refills: 1 | Status: SHIPPED | OUTPATIENT
Start: 2022-05-16

## 2022-05-18 LAB — H. PYLORI BREATH TEST: NEGATIVE

## 2022-05-24 ENCOUNTER — TELEPHONE (OUTPATIENT)
Dept: FAMILY MEDICINE CLINIC | Facility: CLINIC | Age: 47
End: 2022-05-24

## 2022-05-24 RX ORDER — CODEINE PHOSPHATE AND GUAIFENESIN 10; 100 MG/5ML; MG/5ML
5 SOLUTION ORAL EVERY 6 HOURS PRN
Qty: 120 ML | Refills: 0 | Status: SHIPPED | OUTPATIENT
Start: 2022-05-24

## 2022-05-24 NOTE — TELEPHONE ENCOUNTER
Using language line : Bianca Laird ID number [id number]    Patient states she was seen 5/7/22 with cough, COVID test was ordered, results negative. Medications recommended were nasal spray, benzonatate and Claritin. Patient states cough is persistent, present for 1 month total. Cough is primarily dry but occasionally she is able to cough up mucus. Afebrile. Patient denies any other symptoms. Patient is coughing excessively throughout this call. Patient would like to know if alternative medication can be prescribed and/or if you would have any additional recommendations. Verified preferred pharmacy and patient allergies.

## 2022-06-18 ENCOUNTER — OFFICE VISIT (OUTPATIENT)
Dept: FAMILY MEDICINE CLINIC | Facility: CLINIC | Age: 47
End: 2022-06-18
Payer: COMMERCIAL

## 2022-06-18 VITALS
BODY MASS INDEX: 44.33 KG/M2 | HEART RATE: 63 BPM | HEIGHT: 64 IN | DIASTOLIC BLOOD PRESSURE: 83 MMHG | WEIGHT: 259.63 LBS | SYSTOLIC BLOOD PRESSURE: 134 MMHG | TEMPERATURE: 98 F

## 2022-06-18 DIAGNOSIS — M17.12 PRIMARY OSTEOARTHRITIS OF LEFT KNEE: ICD-10-CM

## 2022-06-18 DIAGNOSIS — K21.9 GASTROESOPHAGEAL REFLUX DISEASE WITHOUT ESOPHAGITIS: Primary | ICD-10-CM

## 2022-06-18 PROCEDURE — 3008F BODY MASS INDEX DOCD: CPT | Performed by: FAMILY MEDICINE

## 2022-06-18 PROCEDURE — 3079F DIAST BP 80-89 MM HG: CPT | Performed by: FAMILY MEDICINE

## 2022-06-18 PROCEDURE — 3075F SYST BP GE 130 - 139MM HG: CPT | Performed by: FAMILY MEDICINE

## 2022-06-18 PROCEDURE — 99213 OFFICE O/P EST LOW 20 MIN: CPT | Performed by: FAMILY MEDICINE

## 2022-06-18 RX ORDER — TEA TREE OIL 100 %
OIL (ML) TOPICAL
Qty: 180 CAPSULE | Refills: 3 | Status: SHIPPED | OUTPATIENT
Start: 2022-06-18

## 2022-06-18 RX ORDER — AMLODIPINE BESYLATE 5 MG/1
5 TABLET ORAL DAILY
Qty: 90 TABLET | Refills: 1 | Status: SHIPPED | OUTPATIENT
Start: 2022-06-18

## 2022-06-18 RX ORDER — LEVOTHYROXINE SODIUM 137 UG/1
137 TABLET ORAL
Qty: 90 TABLET | Refills: 1 | Status: SHIPPED | OUTPATIENT
Start: 2022-06-18

## 2022-06-18 RX ORDER — SIMETHICONE 125 MG
250 TABLET,CHEWABLE ORAL EVERY 6 HOURS PRN
Qty: 60 TABLET | Refills: 1 | Status: SHIPPED | OUTPATIENT
Start: 2022-06-18

## 2022-06-18 RX ORDER — PANTOPRAZOLE SODIUM 40 MG/1
40 TABLET, DELAYED RELEASE ORAL
Qty: 30 TABLET | Refills: 3 | Status: SHIPPED | OUTPATIENT
Start: 2022-06-18

## 2022-06-21 ENCOUNTER — NURSE TRIAGE (OUTPATIENT)
Dept: FAMILY MEDICINE CLINIC | Facility: CLINIC | Age: 47
End: 2022-06-21

## 2022-06-21 NOTE — TELEPHONE ENCOUNTER
Patient calling stating she was seen on 6/18 for her arthritis, states that now her feet and ankles are swelling but no pain, would like to know what she should do, states she was previously prescribed something for that same issue

## 2022-06-21 NOTE — TELEPHONE ENCOUNTER
Patient contacted via [employer]  Ernestina Finney ID #[id number]   Left voicemail (both home and mobile) to call office back

## 2022-06-24 ENCOUNTER — OFFICE VISIT (OUTPATIENT)
Dept: FAMILY MEDICINE CLINIC | Facility: CLINIC | Age: 47
End: 2022-06-24
Payer: COMMERCIAL

## 2022-06-24 VITALS
WEIGHT: 260.38 LBS | BODY MASS INDEX: 44.45 KG/M2 | SYSTOLIC BLOOD PRESSURE: 151 MMHG | DIASTOLIC BLOOD PRESSURE: 86 MMHG | HEIGHT: 64 IN | HEART RATE: 72 BPM

## 2022-06-24 DIAGNOSIS — M25.473 ANKLE SWELLING, UNSPECIFIED LATERALITY: Primary | ICD-10-CM

## 2022-06-24 PROCEDURE — 3077F SYST BP >= 140 MM HG: CPT | Performed by: FAMILY MEDICINE

## 2022-06-24 PROCEDURE — 3079F DIAST BP 80-89 MM HG: CPT | Performed by: FAMILY MEDICINE

## 2022-06-24 PROCEDURE — 3008F BODY MASS INDEX DOCD: CPT | Performed by: FAMILY MEDICINE

## 2022-06-24 PROCEDURE — 99213 OFFICE O/P EST LOW 20 MIN: CPT | Performed by: FAMILY MEDICINE

## 2022-07-27 ENCOUNTER — TELEPHONE (OUTPATIENT)
Dept: FAMILY MEDICINE CLINIC | Facility: CLINIC | Age: 47
End: 2022-07-27

## 2022-07-27 DIAGNOSIS — Z12.11 COLON CANCER SCREENING: Primary | ICD-10-CM

## 2022-08-15 ENCOUNTER — OFFICE VISIT (OUTPATIENT)
Dept: FAMILY MEDICINE CLINIC | Facility: CLINIC | Age: 47
End: 2022-08-15
Payer: COMMERCIAL

## 2022-08-15 VITALS
HEIGHT: 64 IN | DIASTOLIC BLOOD PRESSURE: 80 MMHG | SYSTOLIC BLOOD PRESSURE: 138 MMHG | BODY MASS INDEX: 45.35 KG/M2 | HEART RATE: 94 BPM | WEIGHT: 265.63 LBS

## 2022-08-15 DIAGNOSIS — E11.9 TYPE 2 DIABETES MELLITUS WITHOUT COMPLICATION, WITHOUT LONG-TERM CURRENT USE OF INSULIN (HCC): Primary | ICD-10-CM

## 2022-08-15 DIAGNOSIS — R73.03 PREDIABETES: ICD-10-CM

## 2022-08-15 DIAGNOSIS — E66.01 CLASS 3 SEVERE OBESITY DUE TO EXCESS CALORIES WITHOUT SERIOUS COMORBIDITY WITH BODY MASS INDEX (BMI) OF 40.0 TO 44.9 IN ADULT (HCC): ICD-10-CM

## 2022-08-15 DIAGNOSIS — E88.81 METABOLIC SYNDROME: ICD-10-CM

## 2022-08-15 LAB
GLUCOSE BLOOD: 132
TEST STRIP LOT #: NORMAL NUMERIC

## 2022-08-15 PROCEDURE — 3079F DIAST BP 80-89 MM HG: CPT | Performed by: FAMILY MEDICINE

## 2022-08-15 PROCEDURE — 99213 OFFICE O/P EST LOW 20 MIN: CPT | Performed by: FAMILY MEDICINE

## 2022-08-15 PROCEDURE — 82962 GLUCOSE BLOOD TEST: CPT | Performed by: FAMILY MEDICINE

## 2022-08-15 PROCEDURE — 3008F BODY MASS INDEX DOCD: CPT | Performed by: FAMILY MEDICINE

## 2022-08-15 PROCEDURE — 3075F SYST BP GE 130 - 139MM HG: CPT | Performed by: FAMILY MEDICINE

## 2022-08-15 RX ORDER — DULAGLUTIDE 1.5 MG/.5ML
1.5 INJECTION, SOLUTION SUBCUTANEOUS WEEKLY
Qty: 12 PEN | Refills: 1 | Status: SHIPPED | OUTPATIENT
Start: 2022-08-15

## 2022-10-12 ENCOUNTER — TELEPHONE (OUTPATIENT)
Dept: FAMILY MEDICINE CLINIC | Facility: CLINIC | Age: 47
End: 2022-10-12

## 2022-10-12 NOTE — TELEPHONE ENCOUNTER
With [employer]  Swati Walsh ID# [id number]    Patient states has bumps on tongue and gum pain. She states she was seen by dentist who advised that she make an appointment with her primary care physician. She states that she was told that it was stress-related and that it would go away but it has been over 1 month now. She states she would like to be seen at the same time that her  has an appointment on Monday 10/17/22. She was advised that Dr. Adalid Lawrence is fully booked and will not be able to see her during the same appointment as her . She was offered other appointments- patient declined. She states that she will just ask Dr. Adalid Lawrence on 10/17/22 and disconnected the phone call.

## 2022-10-17 ENCOUNTER — OFFICE VISIT (OUTPATIENT)
Dept: FAMILY MEDICINE CLINIC | Facility: CLINIC | Age: 47
End: 2022-10-17
Payer: COMMERCIAL

## 2022-10-17 VITALS
BODY MASS INDEX: 44.36 KG/M2 | WEIGHT: 259.81 LBS | DIASTOLIC BLOOD PRESSURE: 80 MMHG | HEART RATE: 75 BPM | SYSTOLIC BLOOD PRESSURE: 144 MMHG | HEIGHT: 64 IN

## 2022-10-17 DIAGNOSIS — I10 PRIMARY HYPERTENSION: ICD-10-CM

## 2022-10-17 DIAGNOSIS — J18.0 BRONCHOPNEUMONIA: Primary | ICD-10-CM

## 2022-10-17 PROCEDURE — 3008F BODY MASS INDEX DOCD: CPT | Performed by: FAMILY MEDICINE

## 2022-10-17 PROCEDURE — 3079F DIAST BP 80-89 MM HG: CPT | Performed by: FAMILY MEDICINE

## 2022-10-17 PROCEDURE — 99213 OFFICE O/P EST LOW 20 MIN: CPT | Performed by: FAMILY MEDICINE

## 2022-10-17 PROCEDURE — 3077F SYST BP >= 140 MM HG: CPT | Performed by: FAMILY MEDICINE

## 2022-10-17 RX ORDER — BENZONATATE 200 MG/1
200 CAPSULE ORAL 3 TIMES DAILY PRN
Qty: 30 CAPSULE | Refills: 0 | Status: SHIPPED | OUTPATIENT
Start: 2022-10-17

## 2022-10-17 RX ORDER — AZITHROMYCIN 250 MG/1
TABLET, FILM COATED ORAL
Qty: 6 TABLET | Refills: 0 | Status: SHIPPED | OUTPATIENT
Start: 2022-10-17 | End: 2022-10-22

## 2022-10-17 RX ORDER — HYDROCHLOROTHIAZIDE 12.5 MG/1
12.5 CAPSULE, GELATIN COATED ORAL DAILY
Qty: 30 CAPSULE | Refills: 3 | Status: SHIPPED | OUTPATIENT
Start: 2022-10-17

## 2022-11-07 ENCOUNTER — OFFICE VISIT (OUTPATIENT)
Dept: FAMILY MEDICINE CLINIC | Facility: CLINIC | Age: 47
End: 2022-11-07
Payer: COMMERCIAL

## 2022-11-07 ENCOUNTER — LAB ENCOUNTER (OUTPATIENT)
Dept: LAB | Age: 47
End: 2022-11-07
Attending: FAMILY MEDICINE
Payer: COMMERCIAL

## 2022-11-07 VITALS
WEIGHT: 261.38 LBS | DIASTOLIC BLOOD PRESSURE: 84 MMHG | HEART RATE: 84 BPM | HEIGHT: 64 IN | SYSTOLIC BLOOD PRESSURE: 139 MMHG | BODY MASS INDEX: 44.62 KG/M2

## 2022-11-07 DIAGNOSIS — N95.1 POST MENOPAUSAL SYNDROME: Primary | ICD-10-CM

## 2022-11-07 DIAGNOSIS — E03.9 ACQUIRED HYPOTHYROIDISM: ICD-10-CM

## 2022-11-07 DIAGNOSIS — F41.9 ANXIETY: ICD-10-CM

## 2022-11-07 DIAGNOSIS — E11.9 TYPE 2 DIABETES MELLITUS WITHOUT COMPLICATION, WITHOUT LONG-TERM CURRENT USE OF INSULIN (HCC): ICD-10-CM

## 2022-11-07 LAB — TSI SER-ACNC: 0.59 MIU/ML (ref 0.36–3.74)

## 2022-11-07 PROCEDURE — 84443 ASSAY THYROID STIM HORMONE: CPT | Performed by: FAMILY MEDICINE

## 2022-11-07 PROCEDURE — 99214 OFFICE O/P EST MOD 30 MIN: CPT | Performed by: FAMILY MEDICINE

## 2022-11-07 PROCEDURE — 3008F BODY MASS INDEX DOCD: CPT | Performed by: FAMILY MEDICINE

## 2022-11-07 PROCEDURE — 36415 COLL VENOUS BLD VENIPUNCTURE: CPT | Performed by: FAMILY MEDICINE

## 2022-11-07 PROCEDURE — 3079F DIAST BP 80-89 MM HG: CPT | Performed by: FAMILY MEDICINE

## 2022-11-07 PROCEDURE — 3075F SYST BP GE 130 - 139MM HG: CPT | Performed by: FAMILY MEDICINE

## 2022-11-07 RX ORDER — BLOOD SUGAR DIAGNOSTIC
STRIP MISCELLANEOUS
Qty: 100 EACH | Refills: 5 | Status: SHIPPED | OUTPATIENT
Start: 2022-11-07

## 2022-11-07 RX ORDER — ESCITALOPRAM OXALATE 10 MG/1
10 TABLET ORAL DAILY
Qty: 30 TABLET | Refills: 4 | Status: SHIPPED | OUTPATIENT
Start: 2022-11-07

## 2022-11-10 DIAGNOSIS — E11.9 TYPE 2 DIABETES MELLITUS WITHOUT COMPLICATION, WITHOUT LONG-TERM CURRENT USE OF INSULIN (HCC): Primary | ICD-10-CM

## 2022-11-10 RX ORDER — HYDROCHLOROTHIAZIDE 12.5 MG/1
12.5 CAPSULE, GELATIN COATED ORAL DAILY
Qty: 90 CAPSULE | Refills: 1 | Status: SHIPPED | OUTPATIENT
Start: 2022-11-10

## 2022-11-10 NOTE — TELEPHONE ENCOUNTER
Pt on the phone will like a refill on the following medication to be sent to the pharmacy for a 3 month supply.  Please advise    metFORMIN 850 MG Oral Tab    hydroCHLOROthiazide 12.5 MG Oral Cap

## 2022-12-05 ENCOUNTER — OFFICE VISIT (OUTPATIENT)
Dept: FAMILY MEDICINE CLINIC | Facility: CLINIC | Age: 47
End: 2022-12-05
Payer: COMMERCIAL

## 2022-12-05 VITALS
WEIGHT: 262.19 LBS | HEART RATE: 87 BPM | BODY MASS INDEX: 44.76 KG/M2 | HEIGHT: 64 IN | DIASTOLIC BLOOD PRESSURE: 85 MMHG | SYSTOLIC BLOOD PRESSURE: 150 MMHG

## 2022-12-05 DIAGNOSIS — E11.9 TYPE 2 DIABETES MELLITUS WITHOUT COMPLICATION, WITHOUT LONG-TERM CURRENT USE OF INSULIN (HCC): ICD-10-CM

## 2022-12-05 DIAGNOSIS — R04.2 HEMOPTYSIS: Primary | ICD-10-CM

## 2022-12-05 DIAGNOSIS — J18.9 COMMUNITY ACQUIRED PNEUMONIA OF RIGHT LOWER LOBE OF LUNG: ICD-10-CM

## 2022-12-05 LAB
CARTRIDGE LOT#: ABNORMAL NUMERIC
HEMOGLOBIN A1C: 7 % (ref 4.3–5.6)

## 2022-12-05 PROCEDURE — 3077F SYST BP >= 140 MM HG: CPT | Performed by: FAMILY MEDICINE

## 2022-12-05 PROCEDURE — 3008F BODY MASS INDEX DOCD: CPT | Performed by: FAMILY MEDICINE

## 2022-12-05 PROCEDURE — 99214 OFFICE O/P EST MOD 30 MIN: CPT | Performed by: FAMILY MEDICINE

## 2022-12-05 PROCEDURE — 3079F DIAST BP 80-89 MM HG: CPT | Performed by: FAMILY MEDICINE

## 2022-12-05 PROCEDURE — 83036 HEMOGLOBIN GLYCOSYLATED A1C: CPT | Performed by: FAMILY MEDICINE

## 2022-12-05 PROCEDURE — 3051F HG A1C>EQUAL 7.0%<8.0%: CPT | Performed by: FAMILY MEDICINE

## 2022-12-05 RX ORDER — BENZONATATE 200 MG/1
200 CAPSULE ORAL 3 TIMES DAILY PRN
Qty: 30 CAPSULE | Refills: 0 | Status: SHIPPED | OUTPATIENT
Start: 2022-12-05

## 2022-12-05 RX ORDER — LEVOFLOXACIN 500 MG/1
500 TABLET, FILM COATED ORAL DAILY
Qty: 10 TABLET | Refills: 0 | Status: SHIPPED | OUTPATIENT
Start: 2022-12-05 | End: 2022-12-15

## 2022-12-10 ENCOUNTER — HOSPITAL ENCOUNTER (OUTPATIENT)
Dept: GENERAL RADIOLOGY | Age: 47
Discharge: HOME | End: 2022-12-10
Attending: FAMILY MEDICINE
Payer: COMMERCIAL

## 2022-12-10 DIAGNOSIS — J18.9 COMMUNITY ACQUIRED PNEUMONIA OF RIGHT LOWER LOBE OF LUNG: ICD-10-CM

## 2022-12-10 DIAGNOSIS — R04.2 HEMOPTYSIS: ICD-10-CM

## 2022-12-10 PROCEDURE — 71046 X-RAY EXAM CHEST 2 VIEWS: CPT | Performed by: FAMILY MEDICINE

## 2022-12-31 ENCOUNTER — OFFICE VISIT (OUTPATIENT)
Dept: FAMILY MEDICINE CLINIC | Facility: CLINIC | Age: 47
End: 2022-12-31
Payer: COMMERCIAL

## 2022-12-31 VITALS
DIASTOLIC BLOOD PRESSURE: 96 MMHG | WEIGHT: 267.19 LBS | BODY MASS INDEX: 45.62 KG/M2 | SYSTOLIC BLOOD PRESSURE: 157 MMHG | HEIGHT: 64 IN | HEART RATE: 85 BPM

## 2022-12-31 DIAGNOSIS — R04.0 EPISTAXIS: Primary | ICD-10-CM

## 2022-12-31 DIAGNOSIS — N92.6 IRREGULAR MENSES: ICD-10-CM

## 2022-12-31 DIAGNOSIS — K08.89 TOOTH PAIN: ICD-10-CM

## 2022-12-31 PROCEDURE — 3008F BODY MASS INDEX DOCD: CPT | Performed by: FAMILY MEDICINE

## 2022-12-31 PROCEDURE — 3080F DIAST BP >= 90 MM HG: CPT | Performed by: FAMILY MEDICINE

## 2022-12-31 PROCEDURE — 99213 OFFICE O/P EST LOW 20 MIN: CPT | Performed by: FAMILY MEDICINE

## 2022-12-31 PROCEDURE — 3077F SYST BP >= 140 MM HG: CPT | Performed by: FAMILY MEDICINE

## 2022-12-31 RX ORDER — NORETHINDRONE AND ETHINYL ESTRADIOL 7 DAYS X 3
1 KIT ORAL DAILY
Qty: 1 EACH | Refills: 3 | Status: SHIPPED | OUTPATIENT
Start: 2022-12-31 | End: 2023-01-30

## 2022-12-31 RX ORDER — ECHINACEA PURPUREA EXTRACT 125 MG
1 TABLET ORAL 4 TIMES DAILY PRN
Qty: 50 ML | Refills: 1 | Status: SHIPPED | OUTPATIENT
Start: 2022-12-31

## 2022-12-31 RX ORDER — DULAGLUTIDE 3 MG/.5ML
3 INJECTION, SOLUTION SUBCUTANEOUS WEEKLY
Qty: 12 EACH | Refills: 2 | Status: SHIPPED | OUTPATIENT
Start: 2022-12-31

## 2022-12-31 RX ORDER — LEVOTHYROXINE SODIUM 137 UG/1
137 TABLET ORAL
Qty: 90 TABLET | Refills: 1 | Status: SHIPPED | OUTPATIENT
Start: 2022-12-31

## 2023-01-14 ENCOUNTER — EKG ENCOUNTER (OUTPATIENT)
Dept: LAB | Age: 48
End: 2023-01-14
Attending: FAMILY MEDICINE
Payer: COMMERCIAL

## 2023-01-14 ENCOUNTER — LAB ENCOUNTER (OUTPATIENT)
Dept: LAB | Age: 48
End: 2023-01-14
Attending: FAMILY MEDICINE
Payer: COMMERCIAL

## 2023-01-14 ENCOUNTER — OFFICE VISIT (OUTPATIENT)
Dept: FAMILY MEDICINE CLINIC | Facility: CLINIC | Age: 48
End: 2023-01-14

## 2023-01-14 VITALS
DIASTOLIC BLOOD PRESSURE: 86 MMHG | SYSTOLIC BLOOD PRESSURE: 130 MMHG | WEIGHT: 269.63 LBS | HEIGHT: 64 IN | HEART RATE: 80 BPM | BODY MASS INDEX: 46.03 KG/M2

## 2023-01-14 DIAGNOSIS — Z00.00 PHYSICAL EXAM: ICD-10-CM

## 2023-01-14 DIAGNOSIS — Z00.00 PHYSICAL EXAM: Primary | ICD-10-CM

## 2023-01-14 DIAGNOSIS — Z12.11 COLON CANCER SCREENING: ICD-10-CM

## 2023-01-14 LAB
ALBUMIN SERPL-MCNC: 3.8 G/DL (ref 3.4–5)
ALBUMIN/GLOB SERPL: 0.9 {RATIO} (ref 1–2)
ALP LIVER SERPL-CCNC: 62 U/L
ALT SERPL-CCNC: 32 U/L
ANION GAP SERPL CALC-SCNC: 8 MMOL/L (ref 0–18)
AST SERPL-CCNC: 26 U/L (ref 15–37)
ATRIAL RATE: 79 BPM
BASOPHILS # BLD AUTO: 0.07 X10(3) UL (ref 0–0.2)
BASOPHILS NFR BLD AUTO: 0.7 %
BILIRUB SERPL-MCNC: 0.4 MG/DL (ref 0.1–2)
BILIRUB UR QL: NEGATIVE
BILIRUB UR QL: NEGATIVE
BUN BLD-MCNC: 14 MG/DL (ref 7–18)
BUN/CREAT SERPL: 23.7 (ref 10–20)
CALCIUM BLD-MCNC: 9.4 MG/DL (ref 8.5–10.1)
CANCER AG125 SERPL-ACNC: 12.8 U/ML (ref ?–35)
CHLORIDE SERPL-SCNC: 99 MMOL/L (ref 98–112)
CHOLEST SERPL-MCNC: 194 MG/DL (ref ?–200)
CLARITY UR: CLEAR
CLARITY UR: CLEAR
CO2 SERPL-SCNC: 27 MMOL/L (ref 21–32)
COLOR UR: YELLOW
COLOR UR: YELLOW
CREAT BLD-MCNC: 0.59 MG/DL
DEPRECATED RDW RBC AUTO: 42.5 FL (ref 35.1–46.3)
EOSINOPHIL # BLD AUTO: 0.2 X10(3) UL (ref 0–0.7)
EOSINOPHIL NFR BLD AUTO: 2.1 %
ERYTHROCYTE [DISTWIDTH] IN BLOOD BY AUTOMATED COUNT: 12.8 % (ref 11–15)
EST. AVERAGE GLUCOSE BLD GHB EST-MCNC: 154 MG/DL (ref 68–126)
FASTING PATIENT LIPID ANSWER: YES
FASTING STATUS PATIENT QL REPORTED: YES
GFR SERPLBLD BASED ON 1.73 SQ M-ARVRAT: 112 ML/MIN/1.73M2 (ref 60–?)
GLOBULIN PLAS-MCNC: 4.1 G/DL (ref 2.8–4.4)
GLUCOSE BLD-MCNC: 117 MG/DL (ref 70–99)
GLUCOSE UR-MCNC: NEGATIVE MG/DL
GLUCOSE UR-MCNC: NEGATIVE MG/DL
HBA1C MFR BLD: 7 % (ref ?–5.7)
HCT VFR BLD AUTO: 38.7 %
HDLC SERPL-MCNC: 49 MG/DL (ref 40–59)
HGB BLD-MCNC: 12.7 G/DL
IMM GRANULOCYTES # BLD AUTO: 0.02 X10(3) UL (ref 0–1)
IMM GRANULOCYTES NFR BLD: 0.2 %
KETONES UR-MCNC: NEGATIVE MG/DL
KETONES UR-MCNC: NEGATIVE MG/DL
LDLC SERPL CALC-MCNC: 104 MG/DL (ref ?–100)
LEUKOCYTE ESTERASE UR QL STRIP.AUTO: NEGATIVE
LEUKOCYTE ESTERASE UR QL STRIP.AUTO: NEGATIVE
LYMPHOCYTES # BLD AUTO: 3.14 X10(3) UL (ref 1–4)
LYMPHOCYTES NFR BLD AUTO: 32.7 %
MCH RBC QN AUTO: 30.2 PG (ref 26–34)
MCHC RBC AUTO-ENTMCNC: 32.8 G/DL (ref 31–37)
MCV RBC AUTO: 91.9 FL
MONOCYTES # BLD AUTO: 0.6 X10(3) UL (ref 0.1–1)
MONOCYTES NFR BLD AUTO: 6.3 %
NEUTROPHILS # BLD AUTO: 5.57 X10 (3) UL (ref 1.5–7.7)
NEUTROPHILS # BLD AUTO: 5.57 X10(3) UL (ref 1.5–7.7)
NEUTROPHILS NFR BLD AUTO: 58 %
NITRITE UR QL STRIP.AUTO: NEGATIVE
NITRITE UR QL STRIP.AUTO: NEGATIVE
NONHDLC SERPL-MCNC: 145 MG/DL (ref ?–130)
OSMOLALITY SERPL CALC.SUM OF ELEC: 280 MOSM/KG (ref 275–295)
P AXIS: 30 DEGREES
P-R INTERVAL: 134 MS
PH UR: 6 [PH] (ref 5–8)
PH UR: 6 [PH] (ref 5–8)
PLATELET # BLD AUTO: 268 10(3)UL (ref 150–450)
POTASSIUM SERPL-SCNC: 3.5 MMOL/L (ref 3.5–5.1)
PROT SERPL-MCNC: 7.9 G/DL (ref 6.4–8.2)
PROT UR-MCNC: NEGATIVE MG/DL
PROT UR-MCNC: NEGATIVE MG/DL
Q-T INTERVAL: 390 MS
QRS DURATION: 86 MS
QTC CALCULATION (BEZET): 447 MS
R AXIS: 23 DEGREES
RBC # BLD AUTO: 4.21 X10(6)UL
SODIUM SERPL-SCNC: 134 MMOL/L (ref 136–145)
SP GR UR STRIP: 1.02 (ref 1–1.03)
SP GR UR STRIP: 1.02 (ref 1–1.03)
T AXIS: 37 DEGREES
TRIGL SERPL-MCNC: 238 MG/DL (ref 30–149)
TSI SER-ACNC: 3.62 MIU/ML (ref 0.36–3.74)
UROBILINOGEN UR STRIP-ACNC: 0.2
UROBILINOGEN UR STRIP-ACNC: 0.2
VENTRICULAR RATE: 79 BPM
VIT D+METAB SERPL-MCNC: 49.1 NG/ML (ref 30–100)
VLDLC SERPL CALC-MCNC: 40 MG/DL (ref 0–30)
WBC # BLD AUTO: 9.6 X10(3) UL (ref 4–11)

## 2023-01-14 PROCEDURE — 90686 IIV4 VACC NO PRSV 0.5 ML IM: CPT | Performed by: FAMILY MEDICINE

## 2023-01-14 PROCEDURE — 86304 IMMUNOASSAY TUMOR CA 125: CPT | Performed by: FAMILY MEDICINE

## 2023-01-14 PROCEDURE — 99396 PREV VISIT EST AGE 40-64: CPT | Performed by: FAMILY MEDICINE

## 2023-01-14 PROCEDURE — 90715 TDAP VACCINE 7 YRS/> IM: CPT | Performed by: FAMILY MEDICINE

## 2023-01-14 PROCEDURE — 93000 ELECTROCARDIOGRAM COMPLETE: CPT | Performed by: FAMILY MEDICINE

## 2023-01-14 PROCEDURE — 83036 HEMOGLOBIN GLYCOSYLATED A1C: CPT | Performed by: FAMILY MEDICINE

## 2023-01-14 PROCEDURE — 81001 URINALYSIS AUTO W/SCOPE: CPT | Performed by: FAMILY MEDICINE

## 2023-01-14 PROCEDURE — 82306 VITAMIN D 25 HYDROXY: CPT

## 2023-01-14 PROCEDURE — 80053 COMPREHEN METABOLIC PANEL: CPT | Performed by: FAMILY MEDICINE

## 2023-01-14 PROCEDURE — 85025 COMPLETE CBC W/AUTO DIFF WBC: CPT | Performed by: FAMILY MEDICINE

## 2023-01-14 PROCEDURE — 90472 IMMUNIZATION ADMIN EACH ADD: CPT | Performed by: FAMILY MEDICINE

## 2023-01-14 PROCEDURE — 3051F HG A1C>EQUAL 7.0%<8.0%: CPT | Performed by: FAMILY MEDICINE

## 2023-01-14 PROCEDURE — 3075F SYST BP GE 130 - 139MM HG: CPT | Performed by: FAMILY MEDICINE

## 2023-01-14 PROCEDURE — 36415 COLL VENOUS BLD VENIPUNCTURE: CPT | Performed by: FAMILY MEDICINE

## 2023-01-14 PROCEDURE — 3008F BODY MASS INDEX DOCD: CPT | Performed by: FAMILY MEDICINE

## 2023-01-14 PROCEDURE — 84443 ASSAY THYROID STIM HORMONE: CPT | Performed by: FAMILY MEDICINE

## 2023-01-14 PROCEDURE — 81015 MICROSCOPIC EXAM OF URINE: CPT | Performed by: FAMILY MEDICINE

## 2023-01-14 PROCEDURE — 3079F DIAST BP 80-89 MM HG: CPT | Performed by: FAMILY MEDICINE

## 2023-01-14 PROCEDURE — 80061 LIPID PANEL: CPT | Performed by: FAMILY MEDICINE

## 2023-01-14 PROCEDURE — 90471 IMMUNIZATION ADMIN: CPT | Performed by: FAMILY MEDICINE

## 2023-01-16 LAB
C TRACH DNA SPEC QL NAA+PROBE: NEGATIVE
N GONORRHOEA DNA SPEC QL NAA+PROBE: NEGATIVE

## 2023-01-20 LAB — HPV I/H RISK 1 DNA SPEC QL NAA+PROBE: NEGATIVE

## 2023-01-23 LAB
ATRIAL RATE: 79 BPM
P AXIS: 30 DEGREES
P-R INTERVAL: 134 MS
Q-T INTERVAL: 390 MS
QRS DURATION: 86 MS
QTC CALCULATION (BEZET): 447 MS
R AXIS: 23 DEGREES
T AXIS: 37 DEGREES
VENTRICULAR RATE: 79 BPM

## 2023-02-16 ENCOUNTER — NURSE TRIAGE (OUTPATIENT)
Dept: FAMILY MEDICINE CLINIC | Facility: CLINIC | Age: 48
End: 2023-02-16

## 2023-02-16 NOTE — TELEPHONE ENCOUNTER
Used [employer] interpretor [id number] to make an appointment for patient to see Dr. Bishop Haro on Monday. Got voicemail. Left message for patient to call us back.

## 2023-02-17 NOTE — TELEPHONE ENCOUNTER
Future Appointments   Date Time Provider Maria Eugenia Darnell   3/4/2023  8:40 AM ADO YULIA RM1 ADO YULIA EM [place]   3/6/2023  6:15 PM [facility] [address]  Left message to call back on both home and cell phone. No appointment has been booked yet.

## 2023-02-20 ENCOUNTER — OFFICE VISIT (OUTPATIENT)
Dept: FAMILY MEDICINE CLINIC | Facility: CLINIC | Age: 48
End: 2023-02-20

## 2023-02-20 VITALS
HEIGHT: 64 IN | SYSTOLIC BLOOD PRESSURE: 136 MMHG | WEIGHT: 274.38 LBS | HEART RATE: 83 BPM | BODY MASS INDEX: 46.84 KG/M2 | DIASTOLIC BLOOD PRESSURE: 78 MMHG

## 2023-02-20 DIAGNOSIS — E66.01 CLASS 3 SEVERE OBESITY DUE TO EXCESS CALORIES WITH SERIOUS COMORBIDITY AND BODY MASS INDEX (BMI) OF 45.0 TO 49.9 IN ADULT (HCC): ICD-10-CM

## 2023-02-20 DIAGNOSIS — R42 DIZZINESS: ICD-10-CM

## 2023-02-20 DIAGNOSIS — L20.84 INTRINSIC ECZEMA: ICD-10-CM

## 2023-02-20 DIAGNOSIS — R68.89 FORGETFULNESS: Primary | ICD-10-CM

## 2023-02-20 PROCEDURE — 3075F SYST BP GE 130 - 139MM HG: CPT | Performed by: FAMILY MEDICINE

## 2023-02-20 PROCEDURE — 99213 OFFICE O/P EST LOW 20 MIN: CPT | Performed by: FAMILY MEDICINE

## 2023-02-20 PROCEDURE — 3008F BODY MASS INDEX DOCD: CPT | Performed by: FAMILY MEDICINE

## 2023-02-20 PROCEDURE — 3078F DIAST BP <80 MM HG: CPT | Performed by: FAMILY MEDICINE

## 2023-02-20 RX ORDER — MOMETASONE FUROATE 1 MG/G
CREAM TOPICAL
Qty: 30 G | Refills: 1 | Status: SHIPPED | OUTPATIENT
Start: 2023-02-20

## 2023-02-20 RX ORDER — ESCITALOPRAM OXALATE 10 MG/1
10 TABLET ORAL DAILY
Qty: 30 TABLET | Refills: 4 | Status: SHIPPED | OUTPATIENT
Start: 2023-02-20

## 2023-02-20 RX ORDER — PANTOPRAZOLE SODIUM 40 MG/1
40 TABLET, DELAYED RELEASE ORAL
Qty: 30 TABLET | Refills: 3 | Status: SHIPPED | OUTPATIENT
Start: 2023-02-20

## 2023-03-04 ENCOUNTER — HOSPITAL ENCOUNTER (OUTPATIENT)
Dept: MAMMOGRAPHY | Age: 48
Discharge: HOME | End: 2023-03-04
Attending: FAMILY MEDICINE
Payer: COMMERCIAL

## 2023-03-04 DIAGNOSIS — Z00.00 PHYSICAL EXAM: ICD-10-CM

## 2023-03-04 PROCEDURE — 77067 SCR MAMMO BI INCL CAD: CPT | Performed by: FAMILY MEDICINE

## 2023-03-04 PROCEDURE — 77063 BREAST TOMOSYNTHESIS BI: CPT | Performed by: FAMILY MEDICINE

## 2023-03-06 ENCOUNTER — OFFICE VISIT (OUTPATIENT)
Dept: FAMILY MEDICINE CLINIC | Facility: CLINIC | Age: 48
End: 2023-03-06

## 2023-03-06 VITALS
BODY MASS INDEX: 45.21 KG/M2 | DIASTOLIC BLOOD PRESSURE: 85 MMHG | HEIGHT: 64 IN | SYSTOLIC BLOOD PRESSURE: 136 MMHG | WEIGHT: 264.81 LBS | HEART RATE: 93 BPM

## 2023-03-06 DIAGNOSIS — F41.9 ANXIETY: ICD-10-CM

## 2023-03-06 DIAGNOSIS — E11.9 TYPE 2 DIABETES MELLITUS WITHOUT COMPLICATION, WITHOUT LONG-TERM CURRENT USE OF INSULIN (HCC): Primary | ICD-10-CM

## 2023-03-06 DIAGNOSIS — R14.0 BLOATED ABDOMEN: ICD-10-CM

## 2023-03-06 PROCEDURE — 99213 OFFICE O/P EST LOW 20 MIN: CPT | Performed by: FAMILY MEDICINE

## 2023-03-06 PROCEDURE — 3075F SYST BP GE 130 - 139MM HG: CPT | Performed by: FAMILY MEDICINE

## 2023-03-06 PROCEDURE — 3008F BODY MASS INDEX DOCD: CPT | Performed by: FAMILY MEDICINE

## 2023-03-06 PROCEDURE — 3079F DIAST BP 80-89 MM HG: CPT | Performed by: FAMILY MEDICINE

## 2023-03-06 RX ORDER — CYCLOBENZAPRINE HCL 5 MG
5 TABLET ORAL 3 TIMES DAILY PRN
Qty: 30 TABLET | Refills: 0 | Status: SHIPPED | OUTPATIENT
Start: 2023-03-06

## 2023-03-06 RX ORDER — PIOGLITAZONEHYDROCHLORIDE 30 MG/1
30 TABLET ORAL DAILY
Qty: 30 TABLET | Refills: 3 | Status: SHIPPED | OUTPATIENT
Start: 2023-03-06

## 2023-03-06 RX ORDER — NALTREXONE HYDROCHLORIDE AND BUPROPION HYDROCHLORIDE 8; 90 MG/1; MG/1
TABLET, EXTENDED RELEASE ORAL
Qty: 70 TABLET | Refills: 0 | Status: SHIPPED | OUTPATIENT
Start: 2023-03-06 | End: 2023-03-06

## 2023-03-21 RX ORDER — AMLODIPINE BESYLATE 5 MG/1
5 TABLET ORAL DAILY
Qty: 90 TABLET | Refills: 3 | Status: SHIPPED | OUTPATIENT
Start: 2023-03-21

## 2023-03-22 NOTE — TELEPHONE ENCOUNTER
DICLOFENAC passes protocol but not in active medication, last prescription was 2021. [employer] message sent both in [place] and Mongolian for medication clarification/       Refill passed per [employer] protocol.      Requested Prescriptions   Pending Prescriptions Disp Refills    AMLODIPINE 5 MG Oral Tab [Pharmacy Med Name: amLODIPine Besylate 5 MG Oral Tablet] 90 tablet 0     Sig: Take 1 tablet by mouth once daily       Hypertensive Medications Protocol Passed - 3/20/2023  5:11 PM        Passed - In person appointment in the past 12 or next 3 months     Recent Outpatient Visits              2 weeks ago Type 2 diabetes mellitus without complication, without long-term current use of insulin ([facility]    [address], [place], Yanira Sanon MD    Office Visit    4 weeks ago Emigdio Gifford, [place], Yanira Sanon MD    Office Visit    2 months ago Physical exam    [address], [place], Yanira Sanon MD    Office Visit    2 months ago Epistaxis    [address] [place], [place] [address]Yanira MD    Office Visit    3 months ago Hemoptysis    [address] [place], [place] [address]Yanira MD    Office Visit          Future Appointments         Provider Department Appt Notes    In 3 weeks Polo Howard MD [address] [place], [place] [address]Adebayo f/u    In 5 months Tia Calhoun MD [address] [place], [place] [address]Adebayo NP               Passed - Last BP reading less than 140/90     BP Readings from Last 1 Encounters:  03/06/23 : 136/85              Passed - CMP or BMP in past 6 months     Recent Results (from the past 4392 hour(s))   COMP METABOLIC PANEL (14)    Collection Time: 01/14/23  1:33 PM   Result Value Ref Range    Glucose 117 (H) 70 - 99 mg/dL    Sodium 134 (L) 136 - 145 mmol/L    Potassium 3.5 3.5 - 5.1 mmol/L    Chloride 99 98 - 112 mmol/L    CO2 27.0 21.0 - 32.0 mmol/L    Anion Gap 8 0 - 18 mmol/L    BUN 14 7 - 18 mg/dL    Creatinine 0.59 0.55 - 1.02 mg/dL    BUN/CREA Ratio 23.7 (H) 10.0 - 20.0    Calcium, Total 9.4 8.5 - 10.1 mg/dL    Calculated Osmolality 280 275 - 295 mOsm/kg    eGFR-Cr 112 >=60 mL/min/1.73m2    ALT 32 13 - 56 U/L    AST 26 15 - 37 U/L    Alkaline Phosphatase 62 39 - 100 U/L    Bilirubin, Total 0.4 0.1 - 2.0 mg/dL    Total Protein 7.9 6.4 - 8.2 g/dL    Albumin 3.8 3.4 - 5.0 g/dL    Globulin  4.1 2.8 - 4.4 g/dL    A/G Ratio 0.9 (L) 1.0 - 2.0    Patient Fasting for CMP? Yes      *Note: Due to a large number of results and/or encounters for the requested time period, some results have not been displayed. A complete set of results can be found in Results Review.                Passed - In person appointment or virtual visit in the past 6 months     Recent Outpatient Visits              2 weeks ago Type 2 diabetes mellitus without complication, without long-term current use of insulin ([facility]    [address], [place] [address]Jose MD    Office Visit    4 weeks ago Jose Connelly MD    Office Visit    2 months ago Physical exam    [address] [place] [address], Jose Novoa MD    Office Visit    2 months ago Epistaxis    [address], [place] [address]Jose MD    Office Visit    3 months ago Hemoptysis    [address], [place] [address]Jose MD    Office Visit          Future Appointments         Provider Department Appt Notes    In 3 weeks Elizabeth Trevino MD [address] [place] [address], Adebayo f/u    In 5 months Dinah Harrell MD [address] [place] [address], [place] NP               Passed - EGFRCR or GFRNAA > 50     GFR Evaluation  EGFRCR: 112 , resulted on 1/14/2023            DICLOFENAC 50 MG Oral Tab EC [Pharmacy Med Name: Diclofenac Sodium 50 MG Oral Tablet Delayed Release] 60 tablet 0     Sig: TAKE 1 TABLET BY MOUTH THREE TIMES DAILY AS NEEDED WITH FOOD       Non-Narcotic Pain Medication Protocol Passed - 3/20/2023  5:11 PM        Passed - In person appointment or virtual visit in the past 6 mos or appointment in next 3 mos     Recent Outpatient Visits              2 weeks ago Type 2 diabetes mellitus without complication, without long-term current use of insulin ([facility]    [address], [place] [address], Radha Freire MD    Office Visit    4 weeks ago Mitchel Richardedgardcody, [address], Radha Freire MD    Office Visit    2 months ago Physical exam    Radha Choudhury MD    Office Visit    2 months ago Epistaxis    [address], [place] [address], Radha Freire MD    Office Visit    3 months ago Hemoptysis    [address], [place] [address], Radha Freire MD    Office Visit          Future Appointments         Provider Department Appt Notes    In 3 weeks MD Scarlett Quinn, Rains f/u    In 5 months MD Scarlett Crooks Addison NP                     Future Appointments         Provider Department Appt Notes    In 3 weeks MD Scarlett Quinn Addison f/u    In 5 months MD Scarlett Crooks Addison NP             Recent Outpatient Visits              2 weeks ago Type 2 diabetes mellitus without complication, without long-term current use of insulin [facility])    [address], [place] [address], Radha Freire MD    Office Visit    4 weeks ago [address] [place] [address] Fortunato Clark MD    Office Visit    2 months ago Physical exam    Lexi Davis MD    Office Visit    2 months ago Epistaxis    [address], Brandin Hernandez MD    Office Visit    3 months ago Hemoptysis    [address] [place], Brandin Hernandez MD    Office Visit

## 2023-04-06 NOTE — TELEPHONE ENCOUNTER
Patient asking for status on Diclofenac or if Dr. Atilio Ventura can prescribe her a medication that will help for leg pain.

## 2023-04-17 ENCOUNTER — OFFICE VISIT (OUTPATIENT)
Dept: FAMILY MEDICINE CLINIC | Facility: CLINIC | Age: 48
End: 2023-04-17

## 2023-04-17 VITALS
SYSTOLIC BLOOD PRESSURE: 139 MMHG | HEIGHT: 64 IN | WEIGHT: 258 LBS | DIASTOLIC BLOOD PRESSURE: 80 MMHG | HEART RATE: 77 BPM | BODY MASS INDEX: 44.05 KG/M2

## 2023-04-17 DIAGNOSIS — M72.2 PLANTAR FASCIITIS: ICD-10-CM

## 2023-04-17 DIAGNOSIS — M79.89 LEG SWELLING: ICD-10-CM

## 2023-04-17 DIAGNOSIS — E11.9 TYPE 2 DIABETES MELLITUS WITHOUT COMPLICATION, WITHOUT LONG-TERM CURRENT USE OF INSULIN (HCC): Primary | ICD-10-CM

## 2023-04-17 DIAGNOSIS — M54.31 SCIATICA OF RIGHT SIDE: ICD-10-CM

## 2023-04-17 PROCEDURE — 3008F BODY MASS INDEX DOCD: CPT | Performed by: FAMILY MEDICINE

## 2023-04-17 PROCEDURE — 99213 OFFICE O/P EST LOW 20 MIN: CPT | Performed by: FAMILY MEDICINE

## 2023-04-17 PROCEDURE — 3075F SYST BP GE 130 - 139MM HG: CPT | Performed by: FAMILY MEDICINE

## 2023-04-17 PROCEDURE — 3079F DIAST BP 80-89 MM HG: CPT | Performed by: FAMILY MEDICINE

## 2023-04-17 RX ORDER — PIOGLITAZONEHYDROCHLORIDE 30 MG/1
30 TABLET ORAL DAILY
Qty: 30 TABLET | Refills: 3 | Status: SHIPPED | OUTPATIENT
Start: 2023-04-17

## 2023-04-17 RX ORDER — ACETYLCYST/METHYLB12/LEVOMEFOL 600-2-6 MG
1 TABLET ORAL DAILY
COMMUNITY
Start: 2023-03-25

## 2023-04-17 RX ORDER — LEVOTHYROXINE SODIUM 137 UG/1
137 TABLET ORAL
Qty: 90 TABLET | Refills: 1 | Status: SHIPPED | OUTPATIENT
Start: 2023-04-17

## 2023-04-17 RX ORDER — HYDROCHLOROTHIAZIDE 12.5 MG/1
12.5 CAPSULE, GELATIN COATED ORAL DAILY
Qty: 90 CAPSULE | Refills: 1 | Status: SHIPPED | OUTPATIENT
Start: 2023-04-17

## 2023-04-17 RX ORDER — AMLODIPINE BESYLATE 5 MG/1
5 TABLET ORAL DAILY
Qty: 90 TABLET | Refills: 3 | Status: SHIPPED | OUTPATIENT
Start: 2023-04-17

## 2023-04-17 RX ORDER — DULAGLUTIDE 1.5 MG/.5ML
1.5 INJECTION, SOLUTION SUBCUTANEOUS WEEKLY
Qty: 12 EACH | Refills: 2 | Status: SHIPPED | OUTPATIENT
Start: 2023-04-17

## 2023-04-22 ENCOUNTER — HOSPITAL ENCOUNTER (OUTPATIENT)
Dept: GENERAL RADIOLOGY | Age: 48
Discharge: HOME | End: 2023-04-22
Attending: FAMILY MEDICINE
Payer: COMMERCIAL

## 2023-04-22 DIAGNOSIS — M54.31 SCIATICA OF RIGHT SIDE: ICD-10-CM

## 2023-04-22 PROCEDURE — 72110 X-RAY EXAM L-2 SPINE 4/>VWS: CPT | Performed by: FAMILY MEDICINE

## 2023-04-27 ENCOUNTER — TELEPHONE (OUTPATIENT)
Dept: FAMILY MEDICINE CLINIC | Facility: CLINIC | Age: 48
End: 2023-04-27

## 2023-04-27 NOTE — TELEPHONE ENCOUNTER
Using language line : Georges Keller ID # [id number]    Call was transferred from [facility] but having an issue with call so instructed patient that will call back with . Attempted 3 times but patient did not answer. VM left    Patient was calling about pain medication for leg pain.   Last OV 04/17/23

## 2023-05-30 ENCOUNTER — TELEPHONE (OUTPATIENT)
Dept: PHYSICAL THERAPY | Facility: HOSPITAL | Age: 48
End: 2023-05-30

## 2023-06-01 ENCOUNTER — OFFICE VISIT (OUTPATIENT)
Dept: PHYSICAL THERAPY | Age: 48
End: 2023-06-01
Attending: FAMILY MEDICINE
Payer: COMMERCIAL

## 2023-06-01 DIAGNOSIS — M54.31 SCIATICA OF RIGHT SIDE: ICD-10-CM

## 2023-06-01 PROCEDURE — 97110 THERAPEUTIC EXERCISES: CPT | Performed by: PHYSICAL THERAPIST

## 2023-06-01 PROCEDURE — 97161 PT EVAL LOW COMPLEX 20 MIN: CPT | Performed by: PHYSICAL THERAPIST

## 2023-06-03 ENCOUNTER — LAB ENCOUNTER (OUTPATIENT)
Dept: LAB | Age: 48
End: 2023-06-03
Attending: FAMILY MEDICINE
Payer: COMMERCIAL

## 2023-06-03 ENCOUNTER — OFFICE VISIT (OUTPATIENT)
Dept: FAMILY MEDICINE CLINIC | Facility: CLINIC | Age: 48
End: 2023-06-03

## 2023-06-03 VITALS
BODY MASS INDEX: 42.99 KG/M2 | SYSTOLIC BLOOD PRESSURE: 132 MMHG | HEART RATE: 89 BPM | DIASTOLIC BLOOD PRESSURE: 83 MMHG | HEIGHT: 64 IN | WEIGHT: 251.81 LBS

## 2023-06-03 DIAGNOSIS — M72.2 PLANTAR FASCIITIS: ICD-10-CM

## 2023-06-03 DIAGNOSIS — M15.9 PRIMARY OSTEOARTHRITIS INVOLVING MULTIPLE JOINTS: ICD-10-CM

## 2023-06-03 DIAGNOSIS — M15.9 GENERALIZED OSTEOARTHROSIS, INVOLVING MULTIPLE SITES: Primary | ICD-10-CM

## 2023-06-03 DIAGNOSIS — L84 CALLUS: ICD-10-CM

## 2023-06-03 DIAGNOSIS — M15.9 PRIMARY OSTEOARTHRITIS INVOLVING MULTIPLE JOINTS: Primary | ICD-10-CM

## 2023-06-03 LAB
ERYTHROCYTE [SEDIMENTATION RATE] IN BLOOD: 32 MM/HR
RHEUMATOID FACT SERPL-ACNC: <10 IU/ML (ref ?–15)
URATE SERPL-MCNC: 5.4 MG/DL

## 2023-06-03 PROCEDURE — 86431 RHEUMATOID FACTOR QUANT: CPT | Performed by: FAMILY MEDICINE

## 2023-06-03 PROCEDURE — 84550 ASSAY OF BLOOD/URIC ACID: CPT

## 2023-06-03 PROCEDURE — 99213 OFFICE O/P EST LOW 20 MIN: CPT | Performed by: FAMILY MEDICINE

## 2023-06-03 PROCEDURE — 86225 DNA ANTIBODY NATIVE: CPT

## 2023-06-03 PROCEDURE — 83516 IMMUNOASSAY NONANTIBODY: CPT

## 2023-06-03 PROCEDURE — 85652 RBC SED RATE AUTOMATED: CPT | Performed by: FAMILY MEDICINE

## 2023-06-03 PROCEDURE — 3008F BODY MASS INDEX DOCD: CPT | Performed by: FAMILY MEDICINE

## 2023-06-03 PROCEDURE — 3075F SYST BP GE 130 - 139MM HG: CPT | Performed by: FAMILY MEDICINE

## 2023-06-03 PROCEDURE — 36415 COLL VENOUS BLD VENIPUNCTURE: CPT

## 2023-06-03 PROCEDURE — 86036 ANCA SCREEN EACH ANTIBODY: CPT

## 2023-06-03 PROCEDURE — 3079F DIAST BP 80-89 MM HG: CPT | Performed by: FAMILY MEDICINE

## 2023-06-03 RX ORDER — DICLOFENAC SODIUM AND MISOPROSTOL 50; 200 MG/1; UG/1
1 TABLET, DELAYED RELEASE ORAL 2 TIMES DAILY
Qty: 60 TABLET | Refills: 1 | Status: SHIPPED | OUTPATIENT
Start: 2023-06-03

## 2023-06-05 LAB — DSDNA IGG SERPL IA-ACNC: 0.7 IU/ML

## 2023-06-06 ENCOUNTER — OFFICE VISIT (OUTPATIENT)
Dept: PHYSICAL THERAPY | Age: 48
End: 2023-06-06
Attending: FAMILY MEDICINE
Payer: COMMERCIAL

## 2023-06-06 PROCEDURE — 97110 THERAPEUTIC EXERCISES: CPT | Performed by: PHYSICAL THERAPIST

## 2023-06-06 PROCEDURE — 97112 NEUROMUSCULAR REEDUCATION: CPT | Performed by: PHYSICAL THERAPIST

## 2023-06-06 PROCEDURE — 97140 MANUAL THERAPY 1/> REGIONS: CPT | Performed by: PHYSICAL THERAPIST

## 2023-06-06 NOTE — PROGRESS NOTES
Dx: Sciatica of right side (M54.31)          Insurance (Authorized # of Visits):  8           Authorizing Physician: Dr. Slava Jara MD visit: none scheduled  Fall Risk: standard         Precautions: n/a           Medication changes per patient? NO  Date of evaluation/PN: 2023  Pain ratin  Subjective: Reports her symptoms are now localized to her feet and knees; now denies leg and thigh radicular symptoms since starting PT. Objective: decreased muscle guarding to paralumbars noted consistent with abolishment of radicular symptoms. Assessment: Excellent response to lumbar extension program and tolerating progression of neutral to extension bias core stabilization well to promote carry-over of symptom relief and facilitate return to painfree function. Goals: In progress as follows:  Calvin Homes will consistently abolish symptoms with a home program to reduce tissue irritability and promote healing. [employer] will return to painfree flexion including sitting and driving. [employer] will demonstrate improved posture in sitting with use of lumbar roll to reduce tissue irritability when sitting/driving  [employer] will be (I) with a home program to allow discharge from PT towards further self-recovery and maintenance of function    Plan: Check for latent tissue irritability and monitor for readiness to return to flexion. Date: 2023  TX#:  Date:                 TX#: 3/ Date:                 TX#: / Date:                 TX#: 5/ Date:    Tx#: 6/   THERAPEUTIC EX 30 mins       slantboard stretch L4 (B) 3 x 1 min ea       Nustep LE only 10 mins focus on upright posture L4       Rocker board A-P and lateral taps focus on quiet control       Prone heel squeeze Yoga ball 3x10       Prone glut raises 3x10 ea       MELVINA with alt knee flexion and ankle pumps 3x10               NEUROMIUSCULAR RE-EDUCATION 8 mins       3 way hip airex 3x10       Bird dog Standing leaning on counter 3x10 MANUAL TX 8 mins       Joint mobilization Lumbar PA Gr 3-4 x 3 mins L4-5       STM paralumbars x 5 mins       HEP: Continue initial HEP. Charges:  Therapeutic ex x 2; Neuromuscular re-education x 1; Manual Therapy x 1       Total Timed Treatment: 46 min  Total Treatment Time: 46 min

## 2023-06-08 ENCOUNTER — OFFICE VISIT (OUTPATIENT)
Dept: PHYSICAL THERAPY | Age: 48
End: 2023-06-08
Attending: FAMILY MEDICINE
Payer: COMMERCIAL

## 2023-06-08 PROCEDURE — 97110 THERAPEUTIC EXERCISES: CPT | Performed by: PHYSICAL THERAPIST

## 2023-06-08 PROCEDURE — 97112 NEUROMUSCULAR REEDUCATION: CPT | Performed by: PHYSICAL THERAPIST

## 2023-06-08 PROCEDURE — 97140 MANUAL THERAPY 1/> REGIONS: CPT | Performed by: PHYSICAL THERAPIST

## 2023-06-08 NOTE — PROGRESS NOTES
Dx: Sciatica of right side (M54.31)          Insurance (Authorized # of Visits):  8           Authorizing Physician: Dr. Slava Jara MD visit: none scheduled  Fall Risk: standard         Precautions: n/a           Medication changes per patient? NO  Date of evaluation/PN: 2023  Pain ratin  Subjective: Reports he back has been better and now only c/o localized joint pains in her knees and ankles/foot. Objective: Symptoms remaining abolished from lumbar spine and tolerating progression of extension bias core stabilization without symptom provocation. Assessment: Needs caution with loaded activities with knee and ankle joint pains potentially demonstrating sensitivity to load with pending work up. Goals: In progress as follows:  [employer]vin [employer] will consistently abolish symptoms with a home program to reduce tissue irritability and promote healing. [employer] will return to painfree flexion including sitting and driving. [employer] will demonstrate improved posture in sitting with use of lumbar roll to reduce tissue irritability when sitting/driving  [employer] will be (I) with a home program to allow discharge from PT towards further self-recovery and maintenance of function    Plan: Continue with extension bias stabs. Date: 2023  TX#:  Date: 2023                TX#: 3/8 Date:                 TX#: / Date:                 TX#: 5/ Date:    Tx#: /   THERAPEUTIC EX 30 mins 30 mins      slantboard stretch L4 (B) 3 x 1 min ea L4 (B) 3 x 1 min ea      Nustep LE only 10 mins focus on upright posture L4  L4 focus on upright posture      Rocker board A-P and lateral taps focus on quiet control A-P and lateral taps focus on quiet control      Prone heel squeeze Yoga ball 3x10 Yoga ball 3x10      Prone glut raises 3x10 ea 3x10 with 0.5lb ea      MELVINA with alt knee flexion and ankle pumps 3x10       Prone alt/opp UE/LE lifts  3x10 ea      SL hip abd  3x10 0.5lbs ea NEUROMIUSCULAR RE-EDUCATION 8 mins 8 mins      3 way hip airex 3x10 airex 3x10      Bird dog Standing leaning on counter 3x10 Standing leaning on counter 3x10 on airex              MANUAL TX 8 mins 8 mins      Joint mobilization Lumbar PA Gr 3-4 x 3 mins L4-5 Lumbar PA Gr 3-4 x 3 mins L4-5      STM paralumbars x 5 mins paralumbars x 5 mins      HEP: Continue initial HEP. Charges:  Therapeutic ex x 2; Neuromuscular re-education x 1; Manual Therapy x 1       Total Timed Treatment: 46 min  Total Treatment Time: 46 min

## 2023-06-12 LAB
ANCA BY IFA (RDL): NEGATIVE
ANTI-MPO AB (RDL): <20 UNITS
ANTI-PR-3 AB (RDL): <20 UNITS

## 2023-06-13 ENCOUNTER — OFFICE VISIT (OUTPATIENT)
Dept: PHYSICAL THERAPY | Age: 48
End: 2023-06-13
Attending: FAMILY MEDICINE
Payer: COMMERCIAL

## 2023-06-13 PROCEDURE — 97110 THERAPEUTIC EXERCISES: CPT | Performed by: PHYSICAL THERAPIST

## 2023-06-13 PROCEDURE — 97140 MANUAL THERAPY 1/> REGIONS: CPT | Performed by: PHYSICAL THERAPIST

## 2023-06-13 PROCEDURE — 97112 NEUROMUSCULAR REEDUCATION: CPT | Performed by: PHYSICAL THERAPIST

## 2023-06-15 ENCOUNTER — OFFICE VISIT (OUTPATIENT)
Dept: PHYSICAL THERAPY | Age: 48
End: 2023-06-15
Attending: FAMILY MEDICINE
Payer: COMMERCIAL

## 2023-06-15 PROCEDURE — 97110 THERAPEUTIC EXERCISES: CPT | Performed by: PHYSICAL THERAPIST

## 2023-06-15 PROCEDURE — 97112 NEUROMUSCULAR REEDUCATION: CPT | Performed by: PHYSICAL THERAPIST

## 2023-06-15 NOTE — PROGRESS NOTES
Dx: Sciatica of right side (M54.31)          Insurance (Authorized # of Visits):  8           Authorizing Physician: Dr. Genaro Vega  Next MD visit: none scheduled  Fall Risk: standard         Precautions: n/a           Medication changes per patient? NO  Date of evaluation/PN: 2023  Pain ratin  Subjective: reports her knees feel better today but had a little pain in her back and thigh yesterday. Objective: No symptom provocation noted with progression of flexion bias stabs this session      Assessment:Will need to continue monitoring for latent tissue irritation with brief episode of recurrence of lumbar symptoms since last session which continues to be well controlled with her extension HEP. Goals: In progress as follows:  Malaika Urbano will consistently abolish symptoms with a home program to reduce tissue irritability and promote healing. Malaika Urbano will return to painfree flexion including sitting and driving. Malaika Urbano will demonstrate improved posture in sitting with use of lumbar roll to reduce tissue irritability when sitting/driving  Malaika Urbano will be (I) with a home program to allow discharge from PT towards further self-recovery and maintenance of function    Plan: Continue to progress lumbar stabilization to promote proximal stability and support for painful distal joints. Date: 2023  TX#:  Date: 2023                TX#: 3/8 Date:2023                 TX#:  Date:6/15/2023                 TX#:  Date:    Tx#: 6/   THERAPEUTIC EX 30 mins 30 mins 30 mins 45 mins    slantboard stretch L4 (B) 3 x 1 min ea L4 (B) 3 x 1 min ea L5 (B) 3 x 1 min ea L5 (B) 3 x 1 min ea    Nustep LE only 10 mins focus on upright posture L4  L4 focus on upright posture L5 (B)UE/LE focus on upright posture L5 (B)UE/LE focus on upright posture    Rocker board A-P and lateral taps focus on quiet control A-P and lateral taps focus on quiet control A-P and lateral taps focus on quiet control A-P and lateral taps focus on quiet control with airex pad x 30 ea    Prone heel squeeze Yoga ball 3x10 Yoga ball 3x10      Prone glut raises 3x10 ea 3x10 with 0.5lb ea      MELVINA with alt knee flexion and ankle pumps 3x10       Prone alt/opp UE/LE lifts  3x10 ea      SL hip abd  3x10 0.5lbs ea      SB bridging   RSB 2x10 GSB 3x10    SB hams curls   RSB 2x10 GSB 3x10    HL SB TA press   RSB 3x10 GSB 3x10    HL bent knee fall out   Yellow tband 3x10 ea Red tband 3x10 ea    SL clamshells   Yellow tband 3x10 ea Red tband with yoga ball b/w ankles 3x10    REIL   2x10     Rocker bd squats    Lateral challenge with airex 3x10    Lateral step up    airex 3x10 ea    Shuttle (B)    7 band 3x10    Shuttle single leg    5 band 3x10 ea                            NEUROMIUSCULAR RE-EDUCATION 8 mins 8 mins 8 mins 8 mins    3 way hip airex 3x10 airex 3x10 airex with yellow tband around ankles 3x10 airex with red tband around ankles 3x10    Bird dog Standing leaning on counter 3x10 Standing leaning on counter 3x10 on airex Standing leaning on counter 3x10 on airex yellow tband around ankles GSB on wall 3x10            MANUAL TX 8 mins 8 mins 8 mins     Joint mobilization Lumbar PA Gr 3-4 x 3 mins L4-5 Lumbar PA Gr 3-4 x 3 mins L4-5 Lumbar PA Gr 3-4 x 3 mins L4-5     STM paralumbars x 5 mins paralumbars x 5 mins paralumbars x 5 mins     HEP: Continue initial HEP. Charges:  Therapeutic ex x 3; Neuromuscular re-education x 1       Total Timed Treatment: 53 min  Total Treatment Time: 53 min

## 2023-06-20 ENCOUNTER — OFFICE VISIT (OUTPATIENT)
Dept: PHYSICAL THERAPY | Age: 48
End: 2023-06-20
Attending: FAMILY MEDICINE
Payer: COMMERCIAL

## 2023-06-20 PROCEDURE — 97530 THERAPEUTIC ACTIVITIES: CPT | Performed by: PHYSICAL THERAPIST

## 2023-06-20 PROCEDURE — 97112 NEUROMUSCULAR REEDUCATION: CPT | Performed by: PHYSICAL THERAPIST

## 2023-06-20 PROCEDURE — 97110 THERAPEUTIC EXERCISES: CPT | Performed by: PHYSICAL THERAPIST

## 2023-06-20 NOTE — PROGRESS NOTES
Dx: Sciatica of right side (M54.31)          Insurance (Authorized # of Visits):  8           Authorizing Physician: Dr. Juana Jara MD visit: none scheduled  Fall Risk: standard         Precautions: n/a           Medication changes per patient? NO  Date of evaluation/PN: 2023  Pain ratin  Subjective: Denies LBP but knees and feet continue to hurt. Objective: Demonstrating tolerance to return to lumbar flexion without symptom provocation. Assessment: Meeting goals for lumbar spine but comorbidities impacting overall tolerance to normal function. Goals: In progress as follows:  Abraham Clap will consistently abolish symptoms with a home program to reduce tissue irritability and promote healing. Abraham Clap will return to painfree flexion including sitting and driving. Abraham Clap will demonstrate improved posture in sitting with use of lumbar roll to reduce tissue irritability when sitting/driving  Abraham Clap will be (I) with a home program to allow discharge from PT towards further self-recovery and maintenance of function    Plan: Continue to progress lumbar stabilization to promote proximal stability and support for painful distal joints.    Date: 2023  TX#:  Date: 2023                TX#: 3/8 Date:2023                 TX#:  Date:6/15/2023                 TX#:  Date: 2023  Tx#:    THERAPEUTIC EX 30 mins 30 mins 30 mins 45 mins 30 mins   slantboard stretch L4 (B) 3 x 1 min ea L4 (B) 3 x 1 min ea L5 (B) 3 x 1 min ea L5 (B) 3 x 1 min ea L5 (B) 3 x 1 min ea   Nustep LE only 10 mins focus on upright posture L4  L4 focus on upright posture L5 (B)UE/LE focus on upright posture L5 (B)UE/LE focus on upright posture L6 (B)UE/LE focus on upright posture   Rocker board A-P and lateral taps focus on quiet control A-P and lateral taps focus on quiet control A-P and lateral taps focus on quiet control A-P and lateral taps focus on quiet control with airex pad x 30 ea A-P and lateral taps focus on quiet control with airex pad x 30 ea with TA ball press for AP taps   Prone heel squeeze Yoga ball 3x10 Yoga ball 3x10      Prone glut raises 3x10 ea 3x10 with 0.5lb ea      MELVINA with alt knee flexion and ankle pumps 3x10       Prone alt/opp UE/LE lifts  3x10 ea      SL hip abd  3x10 0.5lbs ea      SB bridging   RSB 2x10 GSB 3x10 GSB 3x10   SB hams curls   RSB 2x10 GSB 3x10 GSB 3x10   HL SB TA press   RSB 3x10 GSB 3x10 GSB 3x10   HL bent knee fall out   Yellow tband 3x10 ea Red tband 3x10 ea Red tband 3x10 ea   SL clamshells   Yellow tband 3x10 ea Red tband with yoga ball b/w ankles 3x10 Red tband with yoga ball b/w ankles 3x10   REIL   2x10     Rocker bd squats    Lateral challenge with airex 3x10    Lateral step up    airex 3x10 ea airex 3x10 ea   Shuttle (B)    7 band 3x10    Shuttle single leg    5 band 3x10 ea            THERAPEUTIC ACTIVITY     8 mins   Lateral walking     Red tband around ankles 10 x 10 ft   Fwd/retro monster walk     Red tband around ankles 10 x 10 ft           NEUROMUSCULAR RE-EDUCATION 8 mins 8 mins 8 mins 8 mins 8 mins   3 way hip airex 3x10 airex 3x10 airex with yellow tband around ankles 3x10 airex with red tband around ankles 3x10 airex with red tband around ankles 2x10   Bird dog Standing leaning on counter 3x10 Standing leaning on counter 3x10 on airex Standing leaning on counter 3x10 on airex yellow tband around ankles GSB on wall 3x10 GSB on wall 3x10           MANUAL TX 8 mins 8 mins 8 mins     Joint mobilization Lumbar PA Gr 3-4 x 3 mins L4-5 Lumbar PA Gr 3-4 x 3 mins L4-5 Lumbar PA Gr 3-4 x 3 mins L4-5     STM paralumbars x 5 mins paralumbars x 5 mins paralumbars x 5 mins     HEP: Continue initial HEP. Charges: Therapeutic ex x 2; Neuromuscular re-education x 1;  Therapeutic activity x 1      Total Timed Treatment: 46 min  Total Treatment Time:46 min

## 2023-06-21 NOTE — PROGRESS NOTES
Dx: Sciatica of right side (M54.31)          Insurance (Authorized # of Visits):  8           Authorizing Physician: Dr. Dioni Valdes  Next MD visit: none scheduled  Fall Risk: standard         Precautions: n/a           Medication changes per patient? NO  Date of evaluation/PN: 2023  Pain ratin  Subjective: LB and thigh symptoms continue to remain resolved but localized knee and plantar foot symptoms persist.    Objective: Introduced static flexion via sitting on Nustep without recurrence of lumbar symptoms. Assessment: Tolerating gradual return to flexion without symptom provocation but progression of loaded functional activities will need to be mindful of knee and foot comorbidities. Goals: In progress as follows:  Jared Melendez will consistently abolish symptoms with a home program to reduce tissue irritability and promote healing. Jared Melendez will return to painfree flexion including sitting and driving. Jared Melendez will demonstrate improved posture in sitting with use of lumbar roll to reduce tissue irritability when sitting/driving  Jared Melendez will be (I) with a home program to allow discharge from PT towards further self-recovery and maintenance of function    Plan: Check for latent tissue irritation with introduction of flexion. Date: 2023  TX#:  Date: 2023                TX#: 3/8 Date:2023                 TX#:  Date:                 TX#:  Date:    Tx#: 6/   THERAPEUTIC EX 30 mins 30 mins 30 mins     slantboard stretch L4 (B) 3 x 1 min ea L4 (B) 3 x 1 min ea L5 (B) 3 x 1 min ea     Nustep LE only 10 mins focus on upright posture L4  L4 focus on upright posture L5 (B)UE/LE focus on upright posture     Rocker board A-P and lateral taps focus on quiet control A-P and lateral taps focus on quiet control A-P and lateral taps focus on quiet control     Prone heel squeeze Yoga ball 3x10 Yoga ball 3x10      Prone glut raises 3x10 ea 3x10 with 0.5lb ea      MELVINA with alt knee flexion and ankle pumps 3x10       Prone alt/opp UE/LE lifts  3x10 ea      SL hip abd  3x10 0.5lbs ea      SB bridging   RSB 2x10     SB hams curls   RSB 2x10     HL SB TA press   RSB 3x10     HL bent knee fall out   Yellow tband 3x10 ea     SL clamshells   Yellow tband 3x10 ea     REIL   2x10                     NEUROMIUSCULAR RE-EDUCATION 8 mins 8 mins 8 mins     3 way hip airex 3x10 airex 3x10 airex with yellow tband around ankles 3x10     Bird dog Standing leaning on counter 3x10 Standing leaning on counter 3x10 on airex Standing leaning on counter 3x10 on airex yellow tband around ankles             MANUAL TX 8 mins 8 mins 8 mins     Joint mobilization Lumbar PA Gr 3-4 x 3 mins L4-5 Lumbar PA Gr 3-4 x 3 mins L4-5 Lumbar PA Gr 3-4 x 3 mins L4-5     STM paralumbars x 5 mins paralumbars x 5 mins paralumbars x 5 mins     HEP: Continue initial HEP. Charges:  Therapeutic ex x 2; Neuromuscular re-education x 1; Manual Therapy x 1       Total Timed Treatment: 46 min  Total Treatment Time: 46 min Skin Substitute Paste Text: The defect edges were debeveled with a #15 scalpel blade.  Given the location of the defect, shape of the defect and the proximity to free margins a skin substitute micronized graft was deemed most appropriate.  The entire vial contents were admixed with 0.5ccs of sterile saline, formed into a paste and then evenly spread over the entire wound bed.

## 2023-06-22 ENCOUNTER — OFFICE VISIT (OUTPATIENT)
Dept: PHYSICAL THERAPY | Age: 48
End: 2023-06-22
Attending: FAMILY MEDICINE
Payer: COMMERCIAL

## 2023-06-22 PROCEDURE — 97110 THERAPEUTIC EXERCISES: CPT | Performed by: PHYSICAL THERAPIST

## 2023-06-22 PROCEDURE — 97112 NEUROMUSCULAR REEDUCATION: CPT | Performed by: PHYSICAL THERAPIST

## 2023-06-22 PROCEDURE — 97530 THERAPEUTIC ACTIVITIES: CPT | Performed by: PHYSICAL THERAPIST

## 2023-06-22 NOTE — PROGRESS NOTES
Dx: Sciatica of right side (M54.31)          Insurance (Authorized # of Visits):  8           Authorizing Physician: Dr. Juana Jara MD visit: none scheduled3  Fall Risk: standard         Precautions: n/a           Medication changes per patient? NO  Date of evaluation/PN: 2023  Pain ratin  Subjective: LB continues to feel better and even her knees feel good today and mostly c/o crepitus with certain movements. Objective: Painfree return to loaded flexion and progression to flexion bias lumbar stabilization ex's      Assessment: Improving symptoms from comorbidities allowing progression of CKC strengthening and load/resistance without symptom provocation. Goals: In progress as follows:  Abraham Clap will consistently abolish symptoms with a home program to reduce tissue irritability and promote healing. MET  Abraham Clap will return to painfree flexion including sitting and driving. MET  Abraham Clap will demonstrate improved posture in sitting with use of lumbar roll to reduce tissue irritability when sitting/driving MET  Abraham Clap will be (I) with a home program to allow discharge from PT towards further self-recovery and maintenance of function    Plan: Check response to updated HEP.    Date: 2023  TX#:  Date: 2023                TX#: 3/8 Date:2023                 TX#:  Date:6/15/2023                 TX#:  Date: 2023  Tx#:  Date: 2023  Tx#:     THERAPEUTIC EX 30 mins 30 mins 30 mins 45 mins 30 mins 45 mins    slantboard stretch L4 (B) 3 x 1 min ea L4 (B) 3 x 1 min ea L5 (B) 3 x 1 min ea L5 (B) 3 x 1 min ea L5 (B) 3 x 1 min ea L5 (B) 3 x 1 min ea    Nustep LE only 10 mins focus on upright posture L4  L4 focus on upright posture L5 (B)UE/LE focus on upright posture L5 (B)UE/LE focus on upright posture L6 (B)UE/LE focus on upright posture L6 (B)UE/LE simulating sustained flexion    Rocker board A-P and lateral taps focus on quiet control A-P and lateral taps focus on quiet control A-P and lateral taps focus on quiet control A-P and lateral taps focus on quiet control with airex pad x 30 ea A-P and lateral taps focus on quiet control with airex pad x 30 ea with TA ball press for AP taps A-P and lateral taps focus on quiet control with airex pad x 30 ea with TA ball press for AP taps    Prone heel squeeze Yoga ball 3x10 Yoga ball 3x10        Prone glut raises 3x10 ea 3x10 with 0.5lb ea        MELVINA with alt knee flexion and ankle pumps 3x10         Prone alt/opp UE/LE lifts  3x10 ea        SL hip abd  3x10 0.5lbs ea        SB bridging   RSB 2x10 GSB 3x10 GSB 3x10 GSB 3x10    SB hams curls   RSB 2x10 GSB 3x10 GSB 3x10 GSB 3x10    HL SB TA press   RSB 3x10 GSB 3x10 GSB 3x10 GSB 3x10    HL bent knee fall out   Yellow tband 3x10 ea Red tband 3x10 ea Red tband 3x10 ea     SL clamshells   Yellow tband 3x10 ea Red tband with yoga ball b/w ankles 3x10 Red tband with yoga ball b/w ankles 3x10     REIL   2x10       Rocker bd squats    Lateral challenge with airex 3x10      Lateral step up    airex 3x10 ea airex 3x10 ea airex 3x10 ea    Shuttle (B)    7 band 3x10  8 band 3x10    Shuttle single leg    5 band 3x10 ea  5 band 3x10    HL isometric hip add with UE ring press      Yoga ball 3x10    HL isometric hip abd with LE lifts      3x10    HL SB pick ups      RSB 3x10                        THERAPEUTIC ACTIVITY     8 mins 8 mins    Lateral walking     Red tband around ankles 10 x 10 ft Red tband around ankles 10 x 10 ft    Fwd/retro monster walk     Red tband around ankles 10 x 10 ft Red tband around ankles 10 x 10 ft              NEUROMUSCULAR RE-EDUCATION 8 mins 8 mins 8 mins 8 mins 8 mins 8 mins    3 way hip airex 3x10 airex 3x10 airex with yellow tband around ankles 3x10 airex with red tband around ankles 3x10 airex with red tband around ankles 2x10 airex with red tband around ankles 3x10 with TA ball press    Bird dog Standing leaning on counter 3x10 Standing leaning on counter 3x10 on airex Standing leaning on counter 3x10 on airex yellow tband around ankles GSB on wall 3x10 GSB on wall 3x10 GSB on wall 3x10              MANUAL TX 8 mins 8 mins 8 mins       Joint mobilization Lumbar PA Gr 3-4 x 3 mins L4-5 Lumbar PA Gr 3-4 x 3 mins L4-5 Lumbar PA Gr 3-4 x 3 mins L4-5       STM paralumbars x 5 mins paralumbars x 5 mins paralumbars x 5 mins       HEP: Refer to patient instructions. Charges: Therapeutic ex x 2; Neuromuscular re-education x 1;  Therapeutic activity x 1      Total Timed Treatment: 61 min  Total Treatment Time:61 min

## 2023-06-27 ENCOUNTER — OFFICE VISIT (OUTPATIENT)
Dept: PHYSICAL THERAPY | Age: 48
End: 2023-06-27
Attending: FAMILY MEDICINE
Payer: COMMERCIAL

## 2023-06-27 PROCEDURE — 97112 NEUROMUSCULAR REEDUCATION: CPT | Performed by: PHYSICAL THERAPIST

## 2023-06-27 PROCEDURE — 97110 THERAPEUTIC EXERCISES: CPT | Performed by: PHYSICAL THERAPIST

## 2023-06-27 PROCEDURE — 97530 THERAPEUTIC ACTIVITIES: CPT | Performed by: PHYSICAL THERAPIST

## 2023-07-14 ENCOUNTER — OFFICE VISIT (OUTPATIENT)
Dept: PODIATRY CLINIC | Facility: CLINIC | Age: 48
End: 2023-07-14

## 2023-07-14 DIAGNOSIS — M72.2 PLANTAR FASCIITIS: Primary | ICD-10-CM

## 2023-07-14 DIAGNOSIS — G60.9 IDIOPATHIC NEUROPATHY: ICD-10-CM

## 2023-07-14 PROCEDURE — 99203 OFFICE O/P NEW LOW 30 MIN: CPT | Performed by: STUDENT IN AN ORGANIZED HEALTH CARE EDUCATION/TRAINING PROGRAM

## 2023-07-14 RX ORDER — METHYLPREDNISOLONE 4 MG/1
TABLET ORAL
Qty: 21 TABLET | Refills: 0 | Status: SHIPPED | OUTPATIENT
Start: 2023-07-14 | End: 2023-07-15

## 2023-07-14 NOTE — PROGRESS NOTES
[facility] Podiatry  Progress Note                HPI:       Robert Moralez is a 50year old female. Patient presents with: Foot Pain: Consult- use  Eve LOPEZ#[id number]- bilateral foot- onset-2-3 yrs ago- no injury- rates pain 3-10/10 on and off- stated burning sensation. The pain is worst with post static dyskinesia. Pain is off an on, The left is worst. She currently does not work. She is not very active. She is currently on PT right knee pain. Pt history of of sciatica. Allergies: Contrave [Naltrexone-Bupropion Hcl Er] and Victoza    Current Outpatient Medications   Medication Sig Dispense Refill    methylPREDNISolone 4 MG Oral Tablet Therapy Pack Take per package insert (instructions). Take as directed on the box 21 tablet 0    Diclofenac-miSOPROStol 50-0.2 MG Oral Tab EC Take 1 tablet by mouth 2 (two) times daily. Take it with food. 60 tablet 1    Kzwvkdlxx-Phyutqrag-Apkywydwax (METAFOLBIC PLUS) 6-2-600 MG Oral Tab Take 1 tablet by mouth daily. amLODIPine 5 MG Oral Tab Take 1 tablet (5 mg total) by mouth daily. 90 tablet 3    pioglitazone (ACTOS) 30 MG Oral Tab Take 1 tablet (30 mg total) by mouth daily. 30 tablet 3    Dulaglutide (TRULICITY) 1.5 FV/0.3BF Subcutaneous Solution Pen-injector Inject 1.5 Units into the skin once a week. 12 each 2    levothyroxine (EUTHYROX) 137 MCG Oral Tab Take 137 mcg by mouth before breakfast. 90 tablet 1    hydroCHLOROthiazide 12.5 MG Oral Cap Take 1 capsule (12.5 mg total) by mouth daily. 90 capsule 1    pantoprazole 40 MG Oral Tab EC Take 1 tablet (40 mg total) by mouth before breakfast. 30 tablet 3    escitalopram (LEXAPRO) 10 MG Oral Tab Take 1 tablet (10 mg total) by mouth daily. 30 tablet 4    Mometasone Furoate 0.1 % External Cream Apply to affected area(s) BID 30 g 1    sodium chloride (OCEAN NASAL SPRAY) 0.65 % Nasal Solution 1 spray by Nasal route 4 (four) times daily as needed for congestion.  50 mL 1 Norethin-Eth Estrad Triphasic (ORTHO-NOVUM 7/7/7, 28,) 0.5/0.75/1-35 MG-MCG Oral Tab Take 1 tablet by mouth daily. 1 each 3    Glucose Blood (RELION TRUE METRIX TEST STRIPS) In Vitro Strip Use  each 5    simethicone 125 MG Oral Chew Tab Chew 2 tablets (250 mg total) by mouth every 6 (six) hours as needed for FLATULENCE. 60 tablet 1    ERGOCALCIFEROL 1.25 MG (03269 UT) Oral Cap Take 1 capsule by mouth once a week 4 capsule 0    Loratadine-Pseudoephedrine ER (CLARITIN-D 12 HOUR) 5-120 MG Oral Tablet 12 Hr Take 1 tablet by mouth 2 (two) times daily. 20 tablet 0    Fluticasone Propionate 50 MCG/ACT Nasal Suspension 2 sprays by Each Nare route daily. 1 Inhaler 3      Past Medical History:   Diagnosis Date    Hypothyroidism     Polycystic ovarian disease     Rheumatic fever       Past Surgical History:   Procedure Laterality Date    APPENDECTOMY        Family History   Problem Relation Age of Onset    Circulatory Problems Mother         Peripheral vascular disease    Cancer Paternal Grandfather         Liver cancer     Alcohol and Other Disorders Associated Paternal Grandfather         Alcoholism    Diabetes Paternal Grandmother         Type 2      Social History    Socioeconomic History      Marital status:     Tobacco Use      Smoking status: Never      Smokeless tobacco: Never    Vaping Use      Vaping Use: Never used    Substance and Sexual Activity      Alcohol use: No        Alcohol/week: 0.0 standard drinks of alcohol      Drug use: No        Comment: No history of illicit substance abuse          REVIEW OF SYSTEMS:     Denies nause, fever, chills  No calf pain  Denies chest pain or SOB      EXAM:   LMP 05/15/2023 (Exact Date)   GENERAL: well developed, well nourished, in no apparent distress  EXTREMITIES:   1. Integument: Normal skin temperature and turgor  2.  Vascular: Dorsalis pedis two out of four bilateral and posterior tibial pulses two out of   four bilateral, capillary refill normal.   3. Musculoskeletal: All muscle groups are graded 5 out of 5 in the foot and ankle. Pain on palpation to medial calcaneal tubercle vance. 4. Neurological: Normal sharp dull sensation; reflexes normal.             ASSESSMENT AND PLAN:   Diagnoses and all orders for this visit:    Plantar fasciitis    Idiopathic neuropathy    Other orders  -     methylPREDNISolone 4 MG Oral Tablet Therapy Pack; Take per package insert (instructions). Take as directed on the box        Plan:     -Patient examined, chart history reviewed. -Discussed etiology of patient's condition and various treatment options.  -Discussed importance of proper shoe gear and orthotics. Patient to avoid walking barefoot at all times.  -Discussed importance of stretching exercises. Patient to aim to stretch 3-5 times daily.  -Discussed steroid injection vs oral medrol dose pack with patient including benefits and risks. Patient would like to proceed with oral medrol dose pack for now  -Dispesned Allimed inserts  -Pt deferred PT for now  -RTC as needed. The patient indicates understanding of these issues and agrees to the plan.         Paul Escobedo DPM

## 2023-07-15 ENCOUNTER — LAB ENCOUNTER (OUTPATIENT)
Dept: LAB | Age: 48
End: 2023-07-15
Attending: FAMILY MEDICINE
Payer: COMMERCIAL

## 2023-07-15 ENCOUNTER — OFFICE VISIT (OUTPATIENT)
Dept: FAMILY MEDICINE CLINIC | Facility: CLINIC | Age: 48
End: 2023-07-15

## 2023-07-15 VITALS
DIASTOLIC BLOOD PRESSURE: 81 MMHG | HEART RATE: 77 BPM | HEIGHT: 64 IN | SYSTOLIC BLOOD PRESSURE: 122 MMHG | BODY MASS INDEX: 42 KG/M2 | WEIGHT: 246 LBS

## 2023-07-15 DIAGNOSIS — E11.9 TYPE 2 DIABETES MELLITUS WITHOUT COMPLICATION, WITHOUT LONG-TERM CURRENT USE OF INSULIN (HCC): Primary | ICD-10-CM

## 2023-07-15 DIAGNOSIS — E11.9 TYPE 2 DIABETES MELLITUS WITHOUT COMPLICATION, WITHOUT LONG-TERM CURRENT USE OF INSULIN (HCC): ICD-10-CM

## 2023-07-15 DIAGNOSIS — L30.9 DERMATITIS: ICD-10-CM

## 2023-07-15 LAB
CARTRIDGE LOT#: ABNORMAL NUMERIC
CREAT UR-SCNC: 58.8 MG/DL
HEMOGLOBIN A1C: 6 % (ref 4.3–5.6)
MICROALBUMIN UR-MCNC: <0.5 MG/DL

## 2023-07-15 PROCEDURE — 82570 ASSAY OF URINE CREATININE: CPT

## 2023-07-15 PROCEDURE — 82043 UR ALBUMIN QUANTITATIVE: CPT

## 2023-07-15 PROCEDURE — 3044F HG A1C LEVEL LT 7.0%: CPT | Performed by: FAMILY MEDICINE

## 2023-07-15 PROCEDURE — 99213 OFFICE O/P EST LOW 20 MIN: CPT | Performed by: FAMILY MEDICINE

## 2023-07-15 PROCEDURE — 3008F BODY MASS INDEX DOCD: CPT | Performed by: FAMILY MEDICINE

## 2023-07-15 PROCEDURE — 3079F DIAST BP 80-89 MM HG: CPT | Performed by: FAMILY MEDICINE

## 2023-07-15 PROCEDURE — 3074F SYST BP LT 130 MM HG: CPT | Performed by: FAMILY MEDICINE

## 2023-07-15 PROCEDURE — 3061F NEG MICROALBUMINURIA REV: CPT | Performed by: FAMILY MEDICINE

## 2023-07-15 PROCEDURE — 83036 HEMOGLOBIN GLYCOSYLATED A1C: CPT | Performed by: FAMILY MEDICINE

## 2023-07-15 RX ORDER — ACETYLCYST/METHYLB12/LEVOMEFOL 600-2-6 MG
1 TABLET ORAL DAILY
Qty: 180 TABLET | Refills: 2 | Status: SHIPPED | OUTPATIENT
Start: 2023-07-15

## 2023-07-15 RX ORDER — DULAGLUTIDE 1.5 MG/.5ML
1.5 INJECTION, SOLUTION SUBCUTANEOUS WEEKLY
Qty: 12 EACH | Refills: 2 | Status: SHIPPED | OUTPATIENT
Start: 2023-07-15

## 2023-07-15 RX ORDER — ACETYLCYST/METHYLB12/LEVOMEFOL 600-2-6 MG
1 TABLET ORAL DAILY
Qty: 180 TABLET | Refills: 2 | Status: SHIPPED | OUTPATIENT
Start: 2023-07-15 | End: 2023-07-15

## 2023-07-15 RX ORDER — PIOGLITAZONEHYDROCHLORIDE 30 MG/1
30 TABLET ORAL DAILY
Qty: 30 TABLET | Refills: 3 | Status: SHIPPED | OUTPATIENT
Start: 2023-07-15

## 2023-07-15 RX ORDER — MOMETASONE FUROATE 1 MG/G
CREAM TOPICAL
Qty: 30 G | Refills: 0 | Status: SHIPPED | OUTPATIENT
Start: 2023-07-15

## 2023-07-28 RX ORDER — DICLOFENAC SODIUM AND MISOPROSTOL 50; 200 MG/1; UG/1
1 TABLET, DELAYED RELEASE ORAL 2 TIMES DAILY WITH MEALS
Qty: 60 TABLET | Refills: 0 | Status: SHIPPED | OUTPATIENT
Start: 2023-07-28

## 2023-07-28 NOTE — TELEPHONE ENCOUNTER
Refill passed per [place] protocol.   Requested Prescriptions   Pending Prescriptions Disp Refills    DICLOFENAC-MISOPROSTOL 50-0.2 MG Oral Tab EC [Pharmacy Med Name: Luis Ward 50-0.2 MG Oral Tablet Delayed Release] 60 tablet 0     Sig: Take 1 tablet by mouth twice daily with food       Non-Narcotic Pain Medication Protocol Passed - 7/28/2023 10:55 AM        Passed - In person appointment or virtual visit in the past 6 mos or appointment in next 3 mos     Recent Outpatient Visits              1 week ago Type 2 diabetes mellitus without complication, without long-term current use of insulin ([facility] [address]    Viky Irwin, Charlene Jacob MD    Office Visit    2 weeks ago Plantar fasciitis    Viky Irwin, Adebayo Del Rosario DPM    Office Visit    1 month ago     JULIA Leigh in [facility], PT    Office Visit    1 month ago     JULIA Leigh in [facility], [address] [place] [address] Visit    1 month ago     JULIA Leigh in [facility], [address] [place] [address] Visit          Future Appointments         Provider Department Appt Notes    In 4 weeks Corbin Krabbe, MD Juluis Schilling, [address], [place] NP    In 4 months Ivan Farias MD [address] [facility], Adebayo 5 mnths

## 2023-07-28 NOTE — TELEPHONE ENCOUNTER
Please review refill failed/no protocol - high drug interaction     Requested Prescriptions     Pending Prescriptions Disp Refills    DICLOFENAC-MISOPROSTOL 50-0.2 MG Oral Tab EC [Pharmacy Med Name: [place] Andie 50-0.2 MG Oral Tablet Delayed Release] 60 tablet 0     Sig: Take 1 tablet by mouth twice daily with food         Recent Visits  Date Type Provider Dept   07/15/23 Office Visit Malik Burch MD [facility]   06/03/23 Office Visit Malik Burch MD [facility]   04/17/23 Office Visit Malik Burch MD [facility]   03/06/23 Office Visit Malik Burch MD [facility]   02/20/23 Office Visit Malik Burch MD [facility]   01/14/23 Office Visit Malik Burch MD [facility]   12/31/22 Office Visit Malik Burch MD [facility]   12/05/22 Office Visit Malik Burch MD [facility]   11/07/22 Office Visit Malik Burch MD [facility]   10/17/22 Office Visit Malik Burch MD [facility]   Showing recent visits within past 540 days with a meds authorizing provider and meeting all other requirements  Future Appointments  Date Type Provider Dept   12/16/23 Appointment Malik Burch MD [facility]   Showing future appointments within next 150 days with a meds authorizing provider and meeting all other requirements    Requested Prescriptions   Pending Prescriptions Disp Refills    DICLOFENAC-MISOPROSTOL 50-0.2 MG Oral Tab EC [Pharmacy Med Name: Arbie Andie 50-0.2 MG Oral Tablet Delayed Release] 60 tablet 0     Sig: Take 1 tablet by mouth twice daily with food       Non-Narcotic Pain Medication Protocol Passed - 7/28/2023 10:55 AM        Passed - In person appointment or virtual visit in the past 6 mos or appointment in next 3 mos     Recent Outpatient Visits              1 week ago Type 2 diabetes mellitus without complication, without long-term current use of insulin ([facility]    [address] [facility] Subjective:       Patient ID: Norma Owen is a 37 y.o. female.    Chief Complaint:  Postpartum Care      History of Present Illness  HPI  Postoperative Follow-up  Patient presents to the clinic 5 weeks status post  for  fetal bradycardia . Eating a regular diet without difficulty. Bowel movements are normal. The patient is not having any pain.  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   Date of Last Pap: No result found    OB History    Para Term  AB Living   1 1 1     1   SAB IAB Ectopic Multiple Live Births         0 1      # Outcome Date GA Lbr Rigoberto/2nd Weight Sex Delivery Anes PTL Lv   1 Term 08/10/22 41w0d  2.97 kg (6 lb 8.8 oz) M CS-LTranv EPI N BETSY      Complications: Fetal Intolerance, Oligohydramnios       Review of Systems  Review of Systems   All other systems reviewed and are negative.        Objective:      Physical Exam:   Constitutional: She is oriented to person, place, and time. She appears well-developed.     Eyes: Pupils are equal, round, and reactive to light. Conjunctivae and EOM are normal.      Pulmonary/Chest: Effort normal. Right breast exhibits no mass, no nipple discharge, no skin change, no tenderness and presence. Left breast exhibits no mass, no nipple discharge, no skin change, no tenderness and presence. Breasts are symmetrical.        Abdominal: Soft. She exhibits abdominal incision (well healed).     Genitourinary:    Vagina and uterus normal.      Pelvic exam was performed with patient supine.             Musculoskeletal: Normal range of motion and moves all extremeties. No edema.       Neurological: She is alert and oriented to person, place, and time. She has normal reflexes.    Skin: Skin is warm.    Psychiatric: She has a normal  [facility], [address], Adebayo Marshall MD    Office Visit    2 weeks ago Plantar fasciitis    [address] [facility], [place]    Office Visit    1 month ago     JULIA Leigh in [place], [place]    Office Visit    1 month ago     JULIA Leigh in [address] [place] Visit    1 month ago     JULIA Leigh in [place], [place]    Office Visit          Future Appointments         Provider Department Appt Notes    In 4 weeks Perez Sanchez MD [address], [place] NP    In 4 months Kamran Marshall MD [address], [place]debayo 5 mnths mood and affect.         Assessment:        1. Postpartum care following  delivery    2. S/P  section    3. Encounter for other general counseling or advice on contraception               Plan:      Released from postpartum care  Reviewed contraceptive options--progesterone only ocp, depo, condoms, iud, vasectomy, tl  Reviewed uses of each, risks and benefits.  Pt elects trial of  condoms for now  Ww exam due after 2022

## 2023-08-06 RX ORDER — ESCITALOPRAM OXALATE 10 MG/1
10 TABLET ORAL EVERY MORNING
Qty: 90 TABLET | Refills: 3 | Status: SHIPPED | OUTPATIENT
Start: 2023-08-06

## 2023-08-06 NOTE — TELEPHONE ENCOUNTER
Refill passed per [place] protocol.   High drug interaction warning    Requested Prescriptions   Pending Prescriptions Disp Refills    ESCITALOPRAM 10 MG Oral Tab [Pharmacy Med Name: Escitalopram Oxalate 10 MG Oral Tablet] 90 tablet 0     Sig: TAKE 1 TABLET BY MOUTH IN THE MORNING       Psychiatric Non-Scheduled (Anti-Anxiety) Passed - 8/5/2023 10:27 AM        Passed - In person appointment or virtual visit in the past 6 mos or appointment in next 3 mos     Recent Outpatient Visits              3 weeks ago Type 2 diabetes mellitus without complication, without long-term current use of insulin ([facility]    [address], [address], Brandin Hernandez MD    Office Visit    3 weeks ago Plantar fasciitis    [address], [address], Adebayo [place]    Office Visit    1 month ago     JULIA Leigh in [facility], PT    Office Visit    1 month ago     JULIA Leigh in [facility], [address] Visit    1 month ago     JULIA Leigh in [place], [place]    Office Visit          Future Appointments         Provider Department Appt Notes    In 2 weeks Jim Mayes  [facility] NP    In 4 months Deepali Trevino MD [address], [address], [address] [facility] 5 mnths                    Recent Outpatient Visits              3 weeks ago Type 2 diabetes mellitus without complication, without long-term current use of insulin [facility])    [address], [address], Brandin Hernandez MD    Office Visit    3 weeks ago Plantar fasciitis    [address] [place] [address], Adebayo [place] [facility]    Office Visit    1 month ago     JULIA Leigh in [facility], PT    Office Visit    1 month ago     JULIA Leigh in [facility], [address] Visit    1 month ago [facility] in [place], [place]    Office Visit            Future Appointments         Provider Department Appt Notes    In 2 weeks MD [address], [place] NP    In 4 months MD [address], Adeb[place]o 5 mnths

## 2023-08-25 ENCOUNTER — TELEPHONE (OUTPATIENT)
Dept: GASTROENTEROLOGY | Facility: CLINIC | Age: 48
End: 2023-08-25

## 2023-08-25 ENCOUNTER — OFFICE VISIT (OUTPATIENT)
Dept: GASTROENTEROLOGY | Facility: CLINIC | Age: 48
End: 2023-08-25

## 2023-08-25 VITALS
SYSTOLIC BLOOD PRESSURE: 114 MMHG | DIASTOLIC BLOOD PRESSURE: 69 MMHG | BODY MASS INDEX: 43.36 KG/M2 | HEART RATE: 80 BPM | HEIGHT: 64 IN | WEIGHT: 254 LBS

## 2023-08-25 DIAGNOSIS — R10.12 LUQ ABDOMINAL PAIN: Primary | ICD-10-CM

## 2023-08-25 DIAGNOSIS — Z12.11 SCREENING FOR COLON CANCER: Primary | ICD-10-CM

## 2023-08-25 DIAGNOSIS — Z12.11 ENCOUNTER FOR SCREENING COLONOSCOPY: ICD-10-CM

## 2023-08-25 DIAGNOSIS — K21.9 GASTROESOPHAGEAL REFLUX DISEASE, UNSPECIFIED WHETHER ESOPHAGITIS PRESENT: ICD-10-CM

## 2023-08-25 PROCEDURE — 3008F BODY MASS INDEX DOCD: CPT | Performed by: INTERNAL MEDICINE

## 2023-08-25 PROCEDURE — 3078F DIAST BP <80 MM HG: CPT | Performed by: INTERNAL MEDICINE

## 2023-08-25 PROCEDURE — 99243 OFF/OP CNSLTJ NEW/EST LOW 30: CPT | Performed by: INTERNAL MEDICINE

## 2023-08-25 PROCEDURE — 3074F SYST BP LT 130 MM HG: CPT | Performed by: INTERNAL MEDICINE

## 2023-08-25 RX ORDER — POLYETHYLENE GLYCOL 3350, SODIUM SULFATE ANHYDROUS, SODIUM BICARBONATE, SODIUM CHLORIDE, POTASSIUM CHLORIDE 236; 22.74; 6.74; 5.86; 2.97 G/4L; G/4L; G/4L; G/4L; G/4L
4 POWDER, FOR SOLUTION ORAL ONCE
Qty: 1 EACH | Refills: 0 | Status: SHIPPED | OUTPATIENT
Start: 2023-08-25 | End: 2023-08-25

## 2023-08-25 NOTE — TELEPHONE ENCOUNTER
Refill passed per [place] protocol, however, please advise on alerts. Thanks. High  Drug-Drug: Diclofenac-miSOPROStol and escitalopramToxic effects may be increased with concurrent administration of Diclofenac (Oral) and Selective Serotonin Reuptake Inhibitors. The risk of upper gastrointestinal bleeding may be increased. Patients taking both drugs concurrently should be educated about the signs and symptoms of GI bleeding. Details  Override reason        Diclofenac-miSOPROStol 50-0.2 MG Oral Tab EC [Pharmacy Med Name: Miley Daft 50-0.2 MG Oral Tablet Delayed Release]  Prescription. Reordered. escitalopram 10 MG Oral TabPrescription. Active. Discontinue  Medium  Duplicate Therapy: Diclofenac-miSOPROStol, pantoprazolePEPTIC ULCER AGENTS.  No Abuse/Dependency Potential.  Details    Requested Prescriptions   Pending Prescriptions Disp Refills    DICLOFENAC-MISOPROSTOL 50-0.2 MG Oral Tab EC [Pharmacy Med Name: Miley Daft 50-0.2 MG Oral Tablet Delayed Release] 60 tablet 0     Sig: TAKE 1 TABLET BY MOUTH TWICE DAILY WITH MEALS       Non-Narcotic Pain Medication Protocol Passed - 8/25/2023  9:26 AM        Passed - In person appointment or virtual visit in the past 6 mos or appointment in next 3 mos     Recent Outpatient Visits              Today LUQ abdominal pain    [address] [facility], Leonardo Estevez MD    Office Visit    1 month ago Type 2 diabetes mellitus without complication, without long-term current use of insulin [facility])    Symone Hall, [address], Radha Xiao MD    Office Visit    1 month ago Plantar fasciitis    [address] [facility], Adebayo Joe DPM    Office Visit    1 month ago     JULIA Leigh in [address] Visit    2 months ago     JULIA Leigh in [place], [place]    Office Visit          Future Appointments         Provider Department Appt Notes    In 3 months Abigail Monroy MD [address] [place] [address] 5 mnths                  Recent Outpatient Visits              Today LUQ abdominal pain    [address] [place], [place] [address]Kath MD    Office Visit    1 month ago Type 2 diabetes mellitus without complication, without long-term current use of insulin [facility])    Radhika Rodríguez, [address], Radha Freire MD    Office Visit    1 month ago Plantar fasciitis    [address] [place] [address] Lloyd Cardona DPM    Office Visit    1 month ago     JULIA Leigh in [facility], [facility] 18    2 months ago     JULIA Leigh in [facility], [address] [place] [address] Visit          Future Appointments         Provider Department Appt Notes    In 3 months Abigail Monroy MD [address] [place] [address] 5 mnths

## 2023-08-25 NOTE — PROGRESS NOTES
HPI:    Patient ID: Gilles Santana is a 50year old woman referred to us by Dr Robert Black initially for abdominal symptoms as well as screening colonoscopy examination. She is seen and interviewed today with the assistance of [employer]  Chiara Goode #[id number]. She describes today approximately 2yr history of LUQ abdominal pain which has since resolved. Apparently Dr Kathi Batres advised DC previous Metformin and the pain resolved. Now on Pioglitazone and Trulicity. She is on Pantoprazole therapy for GERD/dyspepsia symptoms. She describes \"muchos gases\" pain, some abd bloating. She describes regular BM about twice daily. Previously referred about 2yrs ago but delayed/deferred due to COVID pandemic    Reassuring pelvic US November 2021 as below.       Recent labs 1/14/2023:  Normal CBC with hemoglobin 12.7g normocytic  AST 26 ALT 32 alk phosphatase 62 serum albumin 3.8    Wt Readings from Last 20 Encounters:  08/25/23 : 254 lb (115.2 kg)  07/15/23 : 246 lb (111.6 kg)  06/03/23 : 251 lb 12.8 oz (114.2 kg)  04/17/23 : 258 lb (117 kg)  03/06/23 : 264 lb 12.8 oz (120.1 kg)  02/20/23 : 274 lb 6.4 oz (124.5 kg)  01/14/23 : 269 lb 9.6 oz (122.3 kg)  12/31/22 : 267 lb 3.2 oz (121.2 kg)  12/05/22 : 262 lb 3.2 oz (118.9 kg)  11/07/22 : 261 lb 6.4 oz (118.6 kg)  10/17/22 : 259 lb 12.8 oz (117.8 kg)  08/15/22 : 265 lb 9.6 oz (120.5 kg)  06/24/22 : 260 lb 6.4 oz (118.1 kg)  06/18/22 : 259 lb 9.6 oz (117.8 kg)  05/16/22 : 246 lb 9.6 oz (111.9 kg)  05/07/22 : 248 lb 9.6 oz (112.8 kg)  11/08/21 : 251 lb (113.9 kg)  10/23/21 : 245 lb 3.2 oz (111.2 kg)  08/26/21 : 243 lb (110.2 kg)  07/19/21 : 246 lb (111.6 kg)        Previous EGD examination: n  Previous colonoscopy(ies): n    HPI    Review of Systems    ======    Vickey Del Rosario MD   11/29/2021    PROCEDURE:US PELVIS W EV (UIU=[id number])    COMPARISON:[facility], US PELVIS EV (TRNS ABD AND ENDOVAG) (CBF=[id number]), 5/12/2018, 7:23 AM.    INDICATIONS:Menorrhagia    TECHNIQUE:Pelvic ultrasound using transabdominal and transvaginal technique. A transvaginal scan was performed for endometrial and adnexal evaluation    FINDINGS:    UTERUS:  Measures 10.7 x 4.3 x 6.2 cm    ENDOMETRIUM:Endometrial thickness 9.2 mm. No fluid or mass in the endometrial canal.    MYOMETRIUM:Anterior uterine leiomyoma measuring 2.6 x 2.2 x 2.4 cm has developed. OVARIES AND ADNEXA:    RIGHT:  Measures 3.1 x 2.3 x 2.6 cm. Normal appearance with no masses. LEFT:  Measures 3.4 x 2.4 x 2.0 cm Normal appearance with no masses. CUL-DE-SAC:  Normal.  No free fluid or mass. OTHER:Negative. Bladder appears normal.          CONCLUSION:  1. Anterior uterine leiomyoma measuring 2.6 x 2.4 cm has developed. 2. Normal thickness endometrium. 3. Normal bilateral ovaries. Dictated by (CST): Hawa Gibson MD on 11/29/2021 at 4:50 PM         Current Outpatient Medications   Medication Sig Dispense Refill    escitalopram 10 MG Oral Tab Take 1 tablet (10 mg total) by mouth every morning. 90 tablet 3    Diclofenac-miSOPROStol 50-0.2 MG Oral Tab EC Take 1 tablet by mouth 2 (two) times daily with meals. 60 tablet 0    Dulaglutide (TRULICITY) 1.5 CN/5.5IV Subcutaneous Solution Pen-injector Inject 1.5 Units into the skin once a week. 12 each 2    pioglitazone (ACTOS) 30 MG Oral Tab Take 1 tablet (30 mg total) by mouth daily. 30 tablet 3    Bzpgwrqgn-Pblabtjxs-Fbmhsvpkpp (METAFOLBIC PLUS) 6-2-600 MG Oral Tab Take 1 tablet by mouth daily. 180 tablet 2    Mometasone Furoate 0.1 % External Cream Apply to affected area(s) BID 30 g 0    amLODIPine 5 MG Oral Tab Take 1 tablet (5 mg total) by mouth daily. 90 tablet 3    levothyroxine (EUTHYROX) 137 MCG Oral Tab Take 137 mcg by mouth before breakfast. 90 tablet 1    hydroCHLOROthiazide 12.5 MG Oral Cap Take 1 capsule (12.5 mg total) by mouth daily.  90 capsule 1    pantoprazole 40 MG Oral Tab EC Take 1 tablet (40 mg total) by mouth before breakfast. 30 tablet 3    Glucose Blood (RELION TRUE METRIX TEST STRIPS) In Vitro Strip Use  each 5    ERGOCALCIFEROL 1.25 MG (72302 UT) Oral Cap Take 1 capsule by mouth once a week 4 capsule 0    Mometasone Furoate 0.1 % External Cream Apply to affected area(s) BID (Patient not taking: Reported on 7/15/2023) 30 g 1    Norethin-Eth Estrad Triphasic (ORTHO-NOVUM 7/7/7, 28,) 0.5/0.75/1-35 MG-MCG Oral Tab Take 1 tablet by mouth daily. 1 each 3    simethicone 125 MG Oral Chew Tab Chew 2 tablets (250 mg total) by mouth every 6 (six) hours as needed for FLATULENCE. (Patient not taking: Reported on 7/15/2023) 60 tablet 1         Allergies:  Contrave [Naltrexon*    DIZZINESS, OTHER (SEE COMMENTS)    Comment:Headaches  Victoza                 OTHER (SEE COMMENTS)    Comment:Abdominal pain  Imaging: No results found. PHYSICAL EXAM:   Physical Exam           ASSESSMENT/PLAN:     50year old woman referred to us by Dr Sophie Rosenberg initially for abdominal symptoms as well as screening colonoscopy examination. She describes today approximately 2yr history of LUQ abdominal pain which has since resolved. Apparently Dr Ryland Lux advised DC previous Metformin and the pain resolved. Now on Pioglitazone and Trulicity. She is on Pantoprazole therapy for GERD/dyspepsia symptoms. She describes \"muchos gases\" pain, some abd bloating. She describes regular BM about twice daily. Previously referred about 2yrs ago but delayed/deferred due to COVID pandemic    Reassuring pelvic US November 2021 as below. Recent labs 1/14/2023:  Normal CBC with hemoglobin 12.7g normocytic  AST 26 ALT 32 alk phosphatase 62 serum albumin 3.8    Suggest:    GERD symptoms, dyspepsia  Adequate control on current pantoprazole  To consider EGD examination in the future if symptoms worsen    2.   Previous LUQ abdominal pain  Resolved with time and/or discontinuing previous metformin  We briefly discussed work-up of this pain including colonoscopy, consideration for cross-sectional abdominal imaging    3. Elevated BMI 49.7, metabolic syndrome with diabetes  Top-normal hepatic transaminases on recent labs  Continue to monitor LFTs every 1-2 years for possible fatty liver disease  Consideration for Hepatitis B/C serologies, liver ultrasound in the future    4. Colon cancer screening, average risk  Due for first screening colonoscopy examination. I recommended and discussed screening colonoscopy examination. Alternatives including stool testing, imaging briefly discussed. Consent:    I recommended colonoscopy examination with possible biopsy, possible polypectomy under MAC anesthesia. We discussed the nature and risks of colonoscopy examination including sedation, anesthesia risks; bleeding, colonic injury or perforation, infection. We discussed the rare occurrence of missed lesions including missed polyps or missed malignancy with colonoscopy examination. The patient understood these risks and agrees to proceed. The need for an accompanying adult to provide a ride home or escort home was also discussed. GoLYTELY bowel prep          Over 35 minutes spent today discussing the above and counseling this patient, reviewing chart notes and data and composing this note. Digital transcription software was utilized to produce this note. The note was proofread for content only. Typographical errors may remain.        Meds This Visit:  Requested Prescriptions      No prescriptions requested or ordered in this encounter       Imaging & Referrals:  None       HR#[id number]

## 2023-08-26 RX ORDER — DICLOFENAC SODIUM AND MISOPROSTOL 50; 200 MG/1; UG/1
1 TABLET, DELAYED RELEASE ORAL 2 TIMES DAILY WITH MEALS
Qty: 180 TABLET | Refills: 2 | Status: SHIPPED | OUTPATIENT
Start: 2023-08-26

## 2023-09-12 RX ORDER — ERGOCALCIFEROL 1.25 MG/1
50000 CAPSULE ORAL WEEKLY
Qty: 12 CAPSULE | Refills: 0 | Status: SHIPPED | OUTPATIENT
Start: 2023-09-12

## 2023-09-18 ENCOUNTER — OFFICE VISIT (OUTPATIENT)
Dept: FAMILY MEDICINE CLINIC | Facility: CLINIC | Age: 48
End: 2023-09-18

## 2023-09-18 VITALS
DIASTOLIC BLOOD PRESSURE: 76 MMHG | SYSTOLIC BLOOD PRESSURE: 128 MMHG | HEART RATE: 90 BPM | HEIGHT: 64 IN | WEIGHT: 257.63 LBS | BODY MASS INDEX: 43.98 KG/M2

## 2023-09-18 DIAGNOSIS — J02.9 SORE THROAT: Primary | ICD-10-CM

## 2023-09-18 PROCEDURE — 3078F DIAST BP <80 MM HG: CPT | Performed by: FAMILY MEDICINE

## 2023-09-18 PROCEDURE — 3008F BODY MASS INDEX DOCD: CPT | Performed by: FAMILY MEDICINE

## 2023-09-18 PROCEDURE — 3074F SYST BP LT 130 MM HG: CPT | Performed by: FAMILY MEDICINE

## 2023-09-18 PROCEDURE — 99213 OFFICE O/P EST LOW 20 MIN: CPT | Performed by: FAMILY MEDICINE

## 2023-09-19 LAB
CONTROL LINE PRESENT WITH A CLEAR BACKGROUND (YES/NO): YES YES/NO
KIT LOT #: NORMAL NUMERIC
STREP GRP A CUL-SCR: NEGATIVE

## 2023-09-27 ENCOUNTER — NURSE TRIAGE (OUTPATIENT)
Dept: FAMILY MEDICINE CLINIC | Facility: CLINIC | Age: 48
End: 2023-09-27

## 2023-09-27 NOTE — TELEPHONE ENCOUNTER
Please call patient to place hold on BP medication. Keep checking BP and restart id SBP > 140. Follow a low salt diet, no caffeine and encourage weight loss.

## 2023-09-27 NOTE — TELEPHONE ENCOUNTER
With [employer]  Kevin Howe   ID # [id number]    Called patient in regards to message below ( identified name and  )\  Patient informed of Dr Ciro Ventura instructions below    Advised to call back if B/P and/ or condition worsens    Patient verbalizes understanding and agrees with plan.

## 2023-09-27 NOTE — TELEPHONE ENCOUNTER
Please reply to pool: EM RN TRIAGE  Action Requested: Summary for Provider     []  Critical Lab, Recommendations Needed  [x] Need Additional Advice  []   FYI    []   Need Orders  [] Need Medications Sent to Pharmacy  []  Other     SUMMARY: Patient contacts clinic reporting low BP and occasional dizziness. Lowest reading was yesterday 82/56. She was feeling dizzy and checked it. Then got readings of 122/61 and 117/59. Currently denies dizziness. Occasional \"prick\" of pain in her chest, not current. Denies shortness of breath. Occasional headache. She declines visit with another provider, RN unable to use res 24 slot. She requests a message be sent to Dr. Genaro Vega to see if he wants to see her or adjust her medications. She has not taken her BP meds today. Please advise.     Reason for call: Blood Pressure  Onset: Data Unavailable                       Reason for Disposition   Wants doctor to measure BP   Brief dizziness or lightheadedness after standing up or eating    Protocols used: Blood Pressure - Low-A-OH

## 2023-09-27 NOTE — TELEPHONE ENCOUNTER
Call transferred from [facility] for symptoms of low blood pressure and dizziness. While getting  on the line, call with patient was dropped. Attempted calling patient back and left voicemail to return call.

## 2023-09-27 NOTE — TELEPHONE ENCOUNTER
Called patient with [address] [facility]  ID# [id number]        Left Voicemail to call back our office. Office phone number provided with office telephone hours.

## 2023-10-19 ENCOUNTER — TELEMEDICINE (OUTPATIENT)
Dept: FAMILY MEDICINE CLINIC | Facility: CLINIC | Age: 48
End: 2023-10-19

## 2023-10-19 DIAGNOSIS — J02.0 PHARYNGITIS DUE TO STREPTOCOCCUS SPECIES: Primary | ICD-10-CM

## 2023-10-19 RX ORDER — AMOXICILLIN 500 MG/1
500 CAPSULE ORAL 2 TIMES DAILY
Qty: 20 CAPSULE | Refills: 0 | Status: SHIPPED | OUTPATIENT
Start: 2023-10-19 | End: 2023-10-29

## 2023-10-19 NOTE — PROGRESS NOTES
Virtual Video Telephone Check-In    [address] [address] verbally consents to a Virtual/Telephone Check-In visit on 10/19/23. Patient has been referred to the [facility] website at [email or web address] to review the yearly Consent to Treat document. Patient understands and accepts financial responsibility for any deductible, co-insurance and/or co-pays associated with this service. Duration of the service: 21 minutes      Summary of topics discussed:     Patient is a 55-year-old female who presents complaining of sore throat for the past 2 days. She denies any runny nose, cough, fever, headaches, body aches, nausea vomiting or diarrhea. Positive for family members with similar complaints. Diagnoses and all orders for this visit:    Pharyngitis due to Streptococcus species      MEDICATIONS:     Requested Prescriptions     Signed Prescriptions Disp Refills    amoxicillin 500 MG Oral Cap 20 capsule 0     Sig: Take 1 capsule (500 mg total) by mouth 2 (two) times daily for 10 days. RECOMMENDATIONS given include: Patient was reassured of  her medical condition and all questions and concerns were answered. Patient was informed to please, call our office with any new or further questions or concerns that may come up in the near future. Notify Dr Wiseman Monday or the [place] [facility] [place] if there is a deterioration or worsening of the medical condition. Also, inform the doctor with any new symptoms or medications' side effects. FOLLOW-UP: Schedule a follow-up visit in  prn.        Mandy Seay MD

## 2023-10-23 ENCOUNTER — TELEPHONE (OUTPATIENT)
Dept: FAMILY MEDICINE CLINIC | Facility: CLINIC | Age: 48
End: 2023-10-23

## 2023-10-23 ENCOUNTER — MED REC SCAN ONLY (OUTPATIENT)
Dept: FAMILY MEDICINE CLINIC | Facility: CLINIC | Age: 48
End: 2023-10-23

## 2023-10-23 DIAGNOSIS — B34.9 ACUTE VIRAL SYNDROME: Primary | ICD-10-CM

## 2023-10-23 NOTE — TELEPHONE ENCOUNTER
Patient called stated that she does not feel any better. Now she has a cough and hoarse voice. Requesting if Dr Sergio Zhong can give her something strong then amoxicillin  or a syrup for the cough. Also pt asking if should r/s her colonoscopy that is scheduled for this weekend. Advised pt to contact GI and discuss this matter with them.  Dr Sergio Zhong please on her other issues,

## 2023-10-24 ENCOUNTER — LAB ENCOUNTER (OUTPATIENT)
Dept: LAB | Age: 48
End: 2023-10-24
Attending: FAMILY MEDICINE

## 2023-10-24 DIAGNOSIS — B34.9 ACUTE VIRAL SYNDROME: ICD-10-CM

## 2023-10-24 PROCEDURE — 87637 SARSCOV2&INF A&B&RSV AMP PRB: CPT

## 2023-10-25 LAB
FLUAV + FLUBV RNA SPEC NAA+PROBE: NOT DETECTED
FLUAV + FLUBV RNA SPEC NAA+PROBE: NOT DETECTED
RSV RNA SPEC NAA+PROBE: NOT DETECTED
SARS-COV-2 RNA RESP QL NAA+PROBE: NOT DETECTED

## 2023-10-26 NOTE — OR NURSING
10/25/2023  Per patient she has been having a productive cough and sore throat for the past weeks. Patient had COVID test which was negative. PCP prescribed the patient antibiotics for her symptoms. On 10/25/2023 patient was called for PAT for her Colonoscopy. Patient sounded very congested and was coughing during phone call. Expressed possibility of having to reschedule due to symptoms. Told patient I would follow up with . Per  request, follow up call was made on 10/26/2023 to assess progression of symptoms. 10/26/2023  Per patient she feels improvement with the cough. She is still mildly having a productive cough, with a sore throat. However, patient states symptoms have improved. Per  ok to proceed. CristopherDARIUS Nicholas discharged to home accompanied by other self.   Patient provided with the following educational materials upon discharge:  Education materials covering medications: Naprosyn, Advair, and Spiriva.   Valuables and belongings sent with patient.   discharge summary, discharge instructions, medications and follow up appointments reviewed with patient.  Patient and  verbalized understanding

## 2023-10-28 ENCOUNTER — HOSPITAL ENCOUNTER (OUTPATIENT)
Facility: HOSPITAL | Age: 48
Setting detail: HOSPITAL OUTPATIENT SURGERY
Discharge: HOME | End: 2023-10-28
Attending: INTERNAL MEDICINE | Admitting: INTERNAL MEDICINE
Payer: COMMERCIAL

## 2023-10-28 VITALS
OXYGEN SATURATION: 98 % | DIASTOLIC BLOOD PRESSURE: 58 MMHG | HEART RATE: 67 BPM | BODY MASS INDEX: 42.85 KG/M2 | HEIGHT: 64 IN | WEIGHT: 251 LBS | SYSTOLIC BLOOD PRESSURE: 113 MMHG | RESPIRATION RATE: 14 BRPM

## 2023-10-28 DIAGNOSIS — Z12.11 SCREENING FOR COLON CANCER: ICD-10-CM

## 2023-10-28 LAB
B-HCG UR QL: NEGATIVE
GLUCOSE BLDC GLUCOMTR-MCNC: 107 MG/DL (ref 70–99)

## 2023-10-28 PROCEDURE — 0DBL8ZX EXCISION OF TRANSVERSE COLON, VIA NATURAL OR ARTIFICIAL OPENING ENDOSCOPIC, DIAGNOSTIC: ICD-10-PCS | Performed by: INTERNAL MEDICINE

## 2023-10-28 PROCEDURE — 45385 COLONOSCOPY W/LESION REMOVAL: CPT | Performed by: INTERNAL MEDICINE

## 2023-10-28 PROCEDURE — G0500 MOD SEDAT ENDO SERVICE >5YRS: HCPCS | Performed by: INTERNAL MEDICINE

## 2023-10-28 PROCEDURE — 0DBK8ZX EXCISION OF ASCENDING COLON, VIA NATURAL OR ARTIFICIAL OPENING ENDOSCOPIC, DIAGNOSTIC: ICD-10-PCS | Performed by: INTERNAL MEDICINE

## 2023-10-28 NOTE — DISCHARGE INSTRUCTIONS
.  .  .  Notes from Dr Monty Benitez:  Two benign colon polyps were found and removed today - to be sent to the lab to be examined - a letter will be sent with results. You may see small quantities of blood with your next 1-2 bowel movements today. If you check your results on [email or web address], the \"pathology\" report on the colon polyp(s) should be released within the next 24-48 hours. In general, a \"hyperplastic\" colon polyp is completely benign, vs an \"adenoma\" or \"sessile serrated adenoma\" which is considered a benign but precancerous colon polyp. That will be explained in a letter/email from me as well. No aspirin, Excedrin, Advil/Motrin/ibuprofen, Aleve/naproxen for next 5 days (to prevent bleeding.)  Internal hemorrhoids - very common  If you are raw and irritated back there after all of that diarrhea and wiping, three good options to soothe and heal the irritation include Aquaphor ointment, \"Desitin\" or \"A & D\" diaper ointment, or good old-fashioned Vaseline. All of these soothe and create a protective barrier so that your skin can heal.  I recommend repeat colonoscopy exam in 5 years. .  .  . Home Care Instructions for Colonoscopy with Sedation    Diet:  - Resume your regular diet as tolerated unless otherwise instructed. - Start with light meals to minimize bloating.  - Do not drink alcohol today. Medication:  - If you have questions about resuming your normal medications, please contact your Primary Care Physician. Activities:  - Take it easy today. Do not return to work today. - Do not drive today. - Do not operate any machinery today (including kitchen equipment).     Colonoscopy:  - You may notice some rectal \"spotting\" (a little blood on the toilet tissue) for a day or two after the exam. This is normal.  - If you experience any rectal bleeding (not spotting), persistent tenderness or sharp severe abdominal pains, oral temperature over 100 degrees Fahrenheit, light-headedness or dizziness, or any other problems, contact your doctor. **If unable to reach your doctor, please go to the [facility]**    - Your referring physician will receive a full report of your examination.  - If you do not hear from your doctor's office within two weeks of your biopsy, please call them for your results. You may be able to see your laboratory results in [facility] between 4 and 7 business days. In some cases, your physician may not have viewed the results before they are released to [address] If you have questions regarding your results contact the physician who ordered the test/exam by phone or via [address] by choosing \"Ask a Medical Question. \"

## 2023-10-28 NOTE — OPERATIVE REPORT
[address]    COLONOSCOPY PROCEDURE REPORT     DATE OF PROCEDURE:  10/28/2023     PCP: Edgar Salcido MD     PREOPERATIVE DIAGNOSIS: Screening colonoscopy examination     POSTOPERATIVE DIAGNOSIS:  See impression. SURGEON:  YAZMIN Santoro Case:      Fentanyl 100mcg IVP  Midazolam 7mg IVP  30  minutes conscious sedation administered     COLONOSCOPY PROCEDURE:   After the nature and risks of colonoscopy examination under conscious sedation were discussed with the patient and all questions answered, informed consent was obtained. The patient was sedated as above. Digital rectal exam was performed which showed no masses. The Olympus pediatric video colonoscope was placed in the patient's rectum and advanced under direct visualization through the entire length of the colon up to the cecum and terminal ileum. Retroflex exam performed up the ascending colon to the hepatic flexure. The cecum was confirmed by landmarks including appendiceal orifice, cecal trifold, ileocecal valve. Retroflexion was performed in the rectum. The quality of the prep was excellent. Estimated blood loss: none/insignificant       COLONOSCOPY FINDINGS:    Sessile 5 mm colon polyp removed from the proximal transverse colon by cold snare polypectomy technique, suctioned out. Subtler lobulated 6-8 mm sessile colon polyp straddling a fold removed from the proximal ascending colon by cold snare polypectomy, piecemeal polypectomy technique. Small internal hemorrhoids. RECOMMENDATIONS:  High fiber diet. Follow-up above colon polyp pathology results. Repeat colonoscopy examination in 5 years or as per polyp pathology results.   No aspirin or NSAID medications for next 5 days to prevent bleeding

## 2023-10-28 NOTE — H&P
History & Physical Examination    Patient Name: Ronald Hidalgo  MRN: [id number]  CSN: [id number]  YOB: 1975    Diagnosis: Screening colonoscopy examination    Present Illness: 42-year-old woman with BMI 43 early metabolic syndrome who presents for average risk screening colonoscopy examination. amoxicillin 500 MG Oral Cap, Take 1 capsule (500 mg total) by mouth 2 (two) times daily for 10 days. , Disp: 20 capsule, Rfl: 0,  at prn  ergocalciferol 1.25 MG (46628 UT) Oral Cap, Take 1 capsule (50,000 Units total) by mouth once a week., Disp: 12 capsule, Rfl: 0, 10/21/2023  Diclofenac-miSOPROStol 50-0.2 MG Oral Tab EC, Take 1 tablet by mouth 2 (two) times daily with meals. , Disp: 180 tablet, Rfl: 2, 10/21/2023  [] PEG 3350-KCl-NaBcb-NaCl-NaSulf (PEG-3350/ELECTROLYTES) 236 g Oral Recon Soln, Take 4 L by mouth one time for 1 dose., Disp: 1 each, Rfl: 0  escitalopram 10 MG Oral Tab, Take 1 tablet (10 mg total) by mouth every morning., Disp: 90 tablet, Rfl: 3, 10/21/2023  Dulaglutide (TRULICITY) 1.5 PL/9.0ZP Subcutaneous Solution Pen-injector, Inject 1.5 Units into the skin once a week., Disp: 12 each, Rfl: 2, 10/19/2023  pioglitazone (ACTOS) 30 MG Oral Tab, Take 1 tablet (30 mg total) by mouth daily. , Disp: 30 tablet, Rfl: 3  Poppatibd-Rnvenvpcp-Ltbfccehiw (METAFOLBIC PLUS) 6-2-600 MG Oral Tab, Take 1 tablet by mouth daily. , Disp: 180 tablet, Rfl: 2  Mometasone Furoate 0.1 % External Cream, Apply to affected area(s) BID, Disp: 30 g, Rfl: 0,  at prn  amLODIPine 5 MG Oral Tab, Take 1 tablet (5 mg total) by mouth daily. , Disp: 90 tablet, Rfl: 3, 2023  levothyroxine (EUTHYROX) 137 MCG Oral Tab, Take 137 mcg by mouth before breakfast., Disp: 90 tablet, Rfl: 1, 10/26/2023  pantoprazole 40 MG Oral Tab EC, Take 1 tablet (40 mg total) by mouth before breakfast., Disp: 30 tablet, Rfl: 3,  at prn  Mometasone Furoate 0.1 % External Cream, Apply to affected area(s) BID, Disp: 30 g, Rfl: 1,  at prn  Norethin-Eth Estrad Triphasic (ORTHO-NOVUM 7/7/7, 28,) 0.5/0.75/1-35 MG-MCG Oral Tab, Take 1 tablet by mouth daily. , Disp: 1 each, Rfl: 3  Glucose Blood (RELION TRUE METRIX TEST STRIPS) In Vitro Strip, Use BID, Disp: 100 each, Rfl: 5  simethicone 125 MG Oral Chew Tab, Chew 2 tablets (250 mg total) by mouth every 6 (six) hours as needed for FLATULENCE., Disp: 60 tablet, Rfl: 1, 10/27/2023      No current facility-administered medications for this encounter. Allergies:   Contrave [Naltrexon*    DIZZINESS, OTHER (SEE COMMENTS)    Comment:Headaches  Victoza                 OTHER (SEE COMMENTS)    Comment:Abdominal pain    Past Medical History:   Diagnosis Date    Hypothyroidism     Polycystic ovarian disease     Rheumatic fever      Past Surgical History:   Procedure Laterality Date    APPENDECTOMY       Family History   Problem Relation Age of Onset    Circulatory Problems Mother         Peripheral vascular disease    Cancer Paternal Grandfather         Liver cancer     Alcohol and Other Disorders Associated Paternal Grandfather         Alcoholism    Diabetes Paternal Grandmother         Type 2     Social History    Tobacco Use      Smoking status: Never      Smokeless tobacco: Never    Alcohol use: No      Alcohol/week: 0.0 standard drinks of alcohol      SYSTEM Check if Review is Normal Check if Physical Exam is Normal If not normal, please explain:   [place] [ ] Tamela ]    [place] [ ] [ ]    HEART [ X ] [ X ]    LUNGS [ X ] [ X ]    [employer] [ X ] [ X ]    UROGENITAL [ ] [ ]    [employer] [ ] [ ]    OTHER          [ x ] I have discussed the risks and benefits and alternatives with the patient/family. They understand and agree to proceed with plan of care. [ x ] I have reviewed the History and Physical done within the last 30 days. Any changes noted above.     Orville Bailey MD  10/28/2023  7:40 AM

## 2023-11-22 ENCOUNTER — OFFICE VISIT (OUTPATIENT)
Dept: FAMILY MEDICINE CLINIC | Facility: CLINIC | Age: 48
End: 2023-11-22

## 2023-11-22 ENCOUNTER — TELEPHONE (OUTPATIENT)
Dept: FAMILY MEDICINE CLINIC | Facility: CLINIC | Age: 48
End: 2023-11-22

## 2023-11-22 VITALS
BODY MASS INDEX: 45.41 KG/M2 | SYSTOLIC BLOOD PRESSURE: 115 MMHG | DIASTOLIC BLOOD PRESSURE: 70 MMHG | HEART RATE: 83 BPM | WEIGHT: 266 LBS | HEIGHT: 64 IN

## 2023-11-22 DIAGNOSIS — N76.1 SUBACUTE VAGINITIS: ICD-10-CM

## 2023-11-22 DIAGNOSIS — R05.2 SUBACUTE COUGH: Primary | ICD-10-CM

## 2023-11-22 DIAGNOSIS — J33.9 NASAL POLYP: ICD-10-CM

## 2023-11-22 PROCEDURE — 3008F BODY MASS INDEX DOCD: CPT | Performed by: FAMILY MEDICINE

## 2023-11-22 PROCEDURE — 99214 OFFICE O/P EST MOD 30 MIN: CPT | Performed by: FAMILY MEDICINE

## 2023-11-22 PROCEDURE — 3078F DIAST BP <80 MM HG: CPT | Performed by: FAMILY MEDICINE

## 2023-11-22 PROCEDURE — 3074F SYST BP LT 130 MM HG: CPT | Performed by: FAMILY MEDICINE

## 2023-11-22 RX ORDER — CODEINE PHOSPHATE AND GUAIFENESIN 10; 100 MG/5ML; MG/5ML
5 SOLUTION ORAL EVERY 6 HOURS PRN
Qty: 180 ML | Refills: 0 | Status: SHIPPED | OUTPATIENT
Start: 2023-11-22

## 2023-11-22 RX ORDER — MOMETASONE FUROATE 50 UG/1
1 SPRAY, METERED NASAL DAILY
Qty: 1 EACH | Refills: 1 | Status: SHIPPED | OUTPATIENT
Start: 2023-11-22

## 2023-11-22 NOTE — TELEPHONE ENCOUNTER
Pt stated she was seen 10/19/23- last week s/s came back but worse yesterday   Coughing while trying to speak  Coughing -productive -cough -persistent -saw little blood yesterday  Taking King cough drops, mentioned feels cold only in the back area, wraps up for warmth  Asking for medication, further advise if can't be seen   No appts

## 2023-11-27 ENCOUNTER — TELEPHONE (OUTPATIENT)
Facility: CLINIC | Age: 48
End: 2023-11-27

## 2023-11-27 NOTE — TELEPHONE ENCOUNTER
Results letter mailed to patient per   Colon recall entered for repeat in 5 yrs,10/28/2028  Colon done in 10/28/2023  HM Updated and Patient Outreach was placed for Colon recall   Danny Pepper MD  P [facility] Clinical Staff  GI RNs - 1.  Please print and mail this letter to patient; 2. Recall for colonoscopy exam in 5 years

## 2023-11-29 RX ORDER — PIOGLITAZONEHYDROCHLORIDE 30 MG/1
30 TABLET ORAL DAILY
Qty: 90 TABLET | Refills: 3 | Status: SHIPPED | OUTPATIENT
Start: 2023-11-29 | End: 2024-03-01

## 2023-11-29 NOTE — TELEPHONE ENCOUNTER
Refill passed per [facility] protocol.    Requested Prescriptions   Pending Prescriptions Disp Refills    PIOGLITAZONE 30 MG Oral Tab [Pharmacy Med Name: Pioglitazone HCl 30 MG Oral Tablet] 90 tablet 0     Sig: Take 1 tablet by mouth once daily       Diabetes Medication Protocol Passed - 11/28/2023  9:21 AM        Passed - Last A1C < 7.5 and within past 6 months     Lab Results   Component Value Date    A1C 6.0 (A) 07/15/2023             Passed - In person appointment or virtual visit in the past 6 mos or appointment in next 3 mos     Recent Outpatient Visits              1 week ago Subacute cough    [facility] Carter White MD    Office Visit    1 month ago Pharyngitis due to Streptococcus species    [facility] Carter White MD    Telemedicine    2 months ago Sore throat    [facility]    [facility] Visit    3 months ago LUQ abdominal pain    [facility] Ollie Somers MD    Office Visit    4 months ago Type 2 diabetes mellitus without complication, without long-term current use of insulin ([facility])    [facility] Carter White MD    Office Visit          Future Appointments         Provider Department Appt Notes    Tomorrow Angel Baum MD [facility] Nasal polyp    In 2 weeks Carter Weir MD [facility] 5 mnths               Passed - EGFRCR or GFRNAA > 50     GFR Evaluation  EGFRCR: 112 , resulted on 1/14/2023          Passed - GFR in the past 12 months             Recent Outpatient Visits              1 week ago Subacute cough    [facility] Carter White MD    Office Visit    1 month ago Pharyngitis due to Streptococcus species     [facility] Carter Weir MD    Telemedicine    2 months ago [facility]    [facility]    3 months ago [facility]    [facility]    4 months ago Type 2 diabetes mellitus without complication, without long-term current use of insulin (HCC)    [facility]            Future Appointments         Provider Department Appt Notes    Tomorrow Angel Baum MD [facility] Nasal polyp    In 2 weeks Carter Weir MD [facility] 5 mnths

## 2023-11-30 ENCOUNTER — OFFICE VISIT (OUTPATIENT)
Dept: OTOLARYNGOLOGY | Facility: CLINIC | Age: 48
End: 2023-11-30

## 2023-11-30 VITALS — BODY MASS INDEX: 45 KG/M2 | WEIGHT: 260 LBS

## 2023-11-30 DIAGNOSIS — R05.3 CHRONIC COUGH: Primary | ICD-10-CM

## 2023-11-30 PROCEDURE — 99243 OFF/OP CNSLTJ NEW/EST LOW 30: CPT | Performed by: OTOLARYNGOLOGY

## 2023-11-30 PROCEDURE — 31575 DIAGNOSTIC LARYNGOSCOPY: CPT | Performed by: OTOLARYNGOLOGY

## 2023-11-30 RX ORDER — MONTELUKAST SODIUM 10 MG/1
10 TABLET ORAL NIGHTLY
Qty: 30 TABLET | Refills: 3 | Status: SHIPPED | OUTPATIENT
Start: 2023-11-30

## 2023-11-30 RX ORDER — FLUTICASONE PROPIONATE 50 MCG
1 SPRAY, SUSPENSION (ML) NASAL 2 TIMES DAILY
Qty: 16 G | Refills: 3 | Status: SHIPPED | OUTPATIENT
Start: 2023-11-30

## 2023-11-30 RX ORDER — PANTOPRAZOLE SODIUM 40 MG/1
40 TABLET, DELAYED RELEASE ORAL DAILY
Qty: 30 TABLET | Refills: 3 | Status: SHIPPED | OUTPATIENT
Start: 2023-11-30

## 2023-12-01 RX ORDER — ERGOCALCIFEROL 1.25 MG/1
50000 CAPSULE ORAL WEEKLY
Qty: 12 CAPSULE | Refills: 0 | Status: SHIPPED | OUTPATIENT
Start: 2023-12-01

## 2023-12-01 NOTE — TELEPHONE ENCOUNTER
Please review. Protocol failed / No protocol.     Requested Prescriptions   Pending Prescriptions Disp Refills    ERGOCALCIFEROL 1.25 MG (20246 UT) Oral Cap [Pharmacy Med Name: Vitamin D (Ergocalciferol) 1.25 MG (31510 UT) Oral Capsule] 12 capsule 0     Sig: Take 1 capsule by mouth once a week       There is no refill protocol information for this order           Recent Outpatient Visits              Yesterday Chronic cough    [facility], [address], [facility], Abelardo Martinez MD    Office Visit    1 week ago Subacute cough    [address], [place] [address], Mahamed Clay MD    Office Visit    1 month ago Pharyngitis due to Streptococcus species    Mahamed Yusuf MD    Telemedicine    2 months ago Sore throat    Mahamed Yusuf MD    Office Visit    3 months ago LUQ abdominal pain    [address], [address], Misty Cary MD    Office Visit             Future Appointments         Provider Department Appt Notes    In 2 weeks MD Soledad Conte Addison 5 mnths

## 2023-12-16 ENCOUNTER — OFFICE VISIT (OUTPATIENT)
Dept: FAMILY MEDICINE CLINIC | Facility: CLINIC | Age: 48
End: 2023-12-16

## 2023-12-16 VITALS
WEIGHT: 259 LBS | DIASTOLIC BLOOD PRESSURE: 80 MMHG | BODY MASS INDEX: 44.22 KG/M2 | HEART RATE: 84 BPM | SYSTOLIC BLOOD PRESSURE: 139 MMHG | HEIGHT: 64 IN

## 2023-12-16 DIAGNOSIS — E66.01 CLASS 3 SEVERE OBESITY DUE TO EXCESS CALORIES WITH SERIOUS COMORBIDITY AND BODY MASS INDEX (BMI) OF 45.0 TO 49.9 IN ADULT (HCC): ICD-10-CM

## 2023-12-16 DIAGNOSIS — E11.9 TYPE 2 DIABETES MELLITUS WITHOUT COMPLICATION, WITHOUT LONG-TERM CURRENT USE OF INSULIN (HCC): Primary | ICD-10-CM

## 2023-12-16 DIAGNOSIS — Z12.31 ENCOUNTER FOR SCREENING MAMMOGRAM FOR MALIGNANT NEOPLASM OF BREAST: ICD-10-CM

## 2023-12-16 PROCEDURE — 90686 IIV4 VACC NO PRSV 0.5 ML IM: CPT | Performed by: FAMILY MEDICINE

## 2023-12-16 PROCEDURE — 3079F DIAST BP 80-89 MM HG: CPT | Performed by: FAMILY MEDICINE

## 2023-12-16 PROCEDURE — 3008F BODY MASS INDEX DOCD: CPT | Performed by: FAMILY MEDICINE

## 2023-12-16 PROCEDURE — 3075F SYST BP GE 130 - 139MM HG: CPT | Performed by: FAMILY MEDICINE

## 2023-12-16 PROCEDURE — 99213 OFFICE O/P EST LOW 20 MIN: CPT | Performed by: FAMILY MEDICINE

## 2023-12-16 PROCEDURE — 90471 IMMUNIZATION ADMIN: CPT | Performed by: FAMILY MEDICINE

## 2023-12-16 RX ORDER — TIRZEPATIDE 5 MG/.5ML
5 INJECTION, SOLUTION SUBCUTANEOUS WEEKLY
Qty: 0.5 ML | Refills: 5 | Status: SHIPPED | OUTPATIENT
Start: 2023-12-16

## 2024-01-03 RX ORDER — LEVOTHYROXINE SODIUM 137 UG/1
137 TABLET ORAL
Qty: 90 TABLET | Refills: 3 | Status: SHIPPED | OUTPATIENT
Start: 2024-01-03

## 2024-01-03 NOTE — TELEPHONE ENCOUNTER
Refill passed per [facility] protocol.  Requested Prescriptions   Pending Prescriptions Disp Refills    LEVOTHYROXINE 137 MCG Oral Tab [Pharmacy Med Name: Levothyroxine Sodium 137 MCG Oral Tablet] 90 tablet 0     Sig: TAKE 1 TABLET BY MOUTH ONCE DAILY BEFORE BREAKFAST       Thyroid Medication Protocol Passed - 1/2/2024  6:56 PM        Passed - TSH in past 12 months        Passed - Last TSH value is normal     Lab Results   Component Value Date    TSH 3.620 01/14/2023    THYROIDFUNC 2.63 06/11/2016    TSHT4 2.86 12/07/2019                 Passed - In person appointment or virtual visit in the past 12 mos or appointment in next 3 mos     Recent Outpatient Visits              2 weeks ago Type 2 diabetes mellitus without complication, without long-term current use of insulin ([facility])    [facility] Carter White MD    Office Visit    [facility] month ago [facility]    Office Visit    1 month ago [facility]acute cough    [facility]    [facility]    2 months ago Pharyngitis due to Streptococcus species    [facility], Carter White MD    Telemedicine    3 months ago Sore throat    [facility] Carter White MD    Office Visit          Future Appointments         Provider Department Appt Notes    In 3 months [facility]                  Future Appointments         Provider Department Appt Notes    In 3 months Carter Weir MD [facility] Follow up          Recent Outpatient Visits              2 weeks ago Type 2 diabetes mellitus without complication, without long-term current use of insulin ([facility])    [facility]  Carter Weir MD    Office Visit    1 month ago Chronic cough    [facility], Angel Yancey MD    Office Visit    1 month ago Subacute cough    [facility], Carter White MD    Office Visit    2 months ago Pharyngitis due to Streptococcus species    [facility], Carter White MD    Telemedicine    3 months ago Sore throat    [facility] Carter White MD    Office Visit

## 2024-01-24 ENCOUNTER — LAB ENCOUNTER (OUTPATIENT)
Dept: LAB | Age: 49
End: 2024-01-24
Attending: FAMILY MEDICINE
Payer: COMMERCIAL

## 2024-01-24 ENCOUNTER — OFFICE VISIT (OUTPATIENT)
Dept: FAMILY MEDICINE CLINIC | Facility: CLINIC | Age: 49
End: 2024-01-24

## 2024-01-24 VITALS
DIASTOLIC BLOOD PRESSURE: 73 MMHG | HEART RATE: 88 BPM | WEIGHT: 261 LBS | HEIGHT: 64 IN | BODY MASS INDEX: 44.56 KG/M2 | SYSTOLIC BLOOD PRESSURE: 122 MMHG

## 2024-01-24 DIAGNOSIS — E66.01 CLASS 3 SEVERE OBESITY DUE TO EXCESS CALORIES WITH SERIOUS COMORBIDITY AND BODY MASS INDEX (BMI) OF 45.0 TO 49.9 IN ADULT (HCC): ICD-10-CM

## 2024-01-24 DIAGNOSIS — E11.9 TYPE 2 DIABETES MELLITUS WITHOUT COMPLICATION, WITHOUT LONG-TERM CURRENT USE OF INSULIN (HCC): ICD-10-CM

## 2024-01-24 DIAGNOSIS — K29.00 ACUTE GASTRITIS WITHOUT HEMORRHAGE, UNSPECIFIED GASTRITIS TYPE: ICD-10-CM

## 2024-01-24 DIAGNOSIS — K29.00 ACUTE GASTRITIS WITHOUT HEMORRHAGE, UNSPECIFIED GASTRITIS TYPE: Primary | ICD-10-CM

## 2024-01-24 DIAGNOSIS — M79.601 ARM PAIN, ANTERIOR, RIGHT: ICD-10-CM

## 2024-01-24 PROCEDURE — 3008F BODY MASS INDEX DOCD: CPT | Performed by: FAMILY MEDICINE

## 2024-01-24 PROCEDURE — 3074F SYST BP LT 130 MM HG: CPT | Performed by: FAMILY MEDICINE

## 2024-01-24 PROCEDURE — 83013 H PYLORI (C-13) BREATH: CPT

## 2024-01-24 PROCEDURE — 99214 OFFICE O/P EST MOD 30 MIN: CPT | Performed by: FAMILY MEDICINE

## 2024-01-24 PROCEDURE — 3078F DIAST BP <80 MM HG: CPT | Performed by: FAMILY MEDICINE

## 2024-01-24 RX ORDER — TIRZEPATIDE 10 MG/.5ML
10 INJECTION, SOLUTION SUBCUTANEOUS WEEKLY
Qty: 0.5 ML | Refills: 5 | Status: SHIPPED | OUTPATIENT
Start: 2024-01-24

## 2024-01-24 NOTE — PROGRESS NOTES
1/24/2024  10:58 AM    Eva Beasley is a 48 year old female.    Chief complaint(s):   Chief Complaint   Patient presents with    Gas    Arm Pain     Right arm     HPI:     Eva Beasley primary complaint is regarding multiple complaints.     Patient is a 48-year-old female with long history of diabetes and obesity who presents complaining of abdominal discomfort and bloatedness with lots of gas.  Also complaining of heartburn at the epigastric area.  Reports that her sugars have been well-controlled but her weight is going up.  I advised her to continue doing walking exercise and follow a 1800 ADA diet.  She also complaining of some light stools but only once a day.  There has been no blood in the stool.  Denies any fever or recent traveling.    Patient also complaining of having right arm pain by the elbow area.  No trauma accidents or injuries.  I advised him to only take acetaminophen as needed, the less the better.      HISTORY:  Past Medical History:   Diagnosis Date    Hypothyroidism     Polycystic ovarian disease     Rheumatic fever       Past Surgical History:   Procedure Laterality Date    APPENDECTOMY      COLONOSCOPY N/A 10/28/2023    Procedure: COLONOSCOPY;  Surgeon: Ollie Whalen MD;  Location: [facility] ENDOSCOPY      Family History   Problem Relation Age of Onset    Circulatory Problems Mother         Peripheral vascular disease    Cancer Paternal Grandfather         Liver cancer     Alcohol and Other Disorders Associated Paternal Grandfather         Alcoholism    Diabetes Paternal Grandmother         Type 2      Social History:   Social History     Socioeconomic History    Marital status:    Tobacco Use    Smoking status: Never     Passive exposure: Never    Smokeless tobacco: Never   Vaping Use    Vaping Use: Never used   Substance and Sexual Activity    Alcohol use: No     Alcohol/week: 0.0 standard drinks of alcohol    Drug use: No      Comment: No history of illicit substance abuse        Immunizations:   Immunization History   Administered Date(s) Administered    FLULAVAL 6 months & older 0.5 ml Prefilled syringe (14966) 12/04/2017, 11/02/2018, 10/22/2019, 10/23/2021, 01/14/2023    FLUZONE 6 months and older PFS 0.5 ml (22024) 10/01/2016, 12/04/2017, 11/02/2018, 12/16/2023    Flulaval, 3 Years & >, IM 10/16/2007, 09/28/2010    Fluvirin, 3 Years & >, Im 12/13/2005, 11/02/2006, 11/06/2012    Influenza 11/11/2008, 10/10/2009, 09/23/2014, 09/16/2015    Influenza Virus Vaccine, H1N1 12/19/2009    TD 02/29/2004    TDAP 07/25/2013, 01/14/2023       Medications (Active prior to today's visit):  Current Outpatient Medications   Medication Sig Dispense Refill    Tirzepatide (MOUNJARO) 10 MG/0.5ML Subcutaneous Solution Pen-injector Inject 10 mg into the skin once a week. 0.5 mL 5    levothyroxine 137 MCG Oral Tab Take 137 mcg by mouth before breakfast. 90 tablet 3    Loratadine-Pseudoephedrine (EQL ALLERGY/CONGESTION RELIEF OR) Take 1 tablet by mouth Q12H.      Tirzepatide (MOUNJARO) 5 MG/0.5ML Subcutaneous Solution Pen-injector Inject 5 mg into the skin once a week. 0.5 mL 5    ergocalciferol 1.25 MG (23791 UT) Oral Cap Take 1 capsule (50,000 Units total) by mouth once a week. 12 capsule 0    pantoprazole 40 MG Oral Tab EC Take 1 tablet (40 mg total) by mouth daily. 30 tablet 3    montelukast 10 MG Oral Tab Take 1 tablet (10 mg total) by mouth nightly. 30 tablet 3    fluticasone propionate 50 MCG/ACT Nasal Suspension 1 spray by Nasal route 2 (two) times daily. 16 g 3    pioglitazone 30 MG Oral Tab Take 1 tablet (30 mg total) by mouth daily. 90 tablet 3    Diclofenac-miSOPROStol 50-0.2 MG Oral Tab EC Take 1 tablet by mouth 2 (two) times daily with meals. 180 tablet 2    escitalopram 10 MG Oral Tab Take 1 tablet (10 mg total) by mouth every morning. 90 tablet 3    Ejteqbpkt-Evktdbetu-Apwygfhoaz (METAFOLBIC PLUS) 6-2-600 MG Oral Tab Take 1 tablet by mouth  daily. 180 tablet 2    amLODIPine 5 MG Oral Tab Take 1 tablet (5 mg total) by mouth daily. 90 tablet 3    Glucose Blood (RELION TRUE METRIX TEST STRIPS) In Vitro Strip Use  each 5    simethicone 125 MG Oral Chew Tab Chew 2 tablets (250 mg total) by mouth every 6 (six) hours as needed for FLATULENCE. 60 tablet 1    mometasone furoate 50 MCG/ACT Nasal Suspension 1 spray by Nasal route daily. (Patient not taking: Reported on 1/24/2024) 1 each 1    Dulaglutide (TRULICITY) 1.5 MG/0.5ML Subcutaneous Solution Pen-injector Inject 1.5 Units into the skin once a week. (Patient not taking: Reported on 1/24/2024) 12 each 2    Mometasone Furoate 0.1 % External Cream Apply to affected area(s) BID (Patient not taking: Reported on 1/24/2024) 30 g 1       Allergies:  Allergies   Allergen Reactions    Contrave [Naltrexone-Bupropion Hcl Er] DIZZINESS and OTHER (SEE COMMENTS)     Headaches     Victoza OTHER (SEE COMMENTS)     Abdominal pain         ROS:   Review of Systems   Constitutional:  Negative for appetite change, diaphoresis, fatigue and fever.   HENT:  Negative for hearing loss and nosebleeds.    Eyes:  Negative for visual disturbance.   Respiratory:  Negative for shortness of breath.    Cardiovascular:  Negative for chest pain and palpitations.   Gastrointestinal:  Positive for abdominal distention (bloated). Negative for abdominal pain, constipation, diarrhea, nausea and vomiting.        Heartburn   Endocrine: Negative for polydipsia and polyuria.   Genitourinary:  Negative for hematuria and menstrual problem.   Musculoskeletal:  Negative for arthralgias.        Right anterior arm pain   Skin:  Negative for rash.   Neurological:  Negative for dizziness and headaches.   Psychiatric/Behavioral:  Negative for dysphoric mood and sleep disturbance.        PHYSICAL EXAM:   VS: /73 (BP Location: Left arm, Patient Position: Sitting, Cuff Size: large)   Pulse 88   Ht 5' 4\" (1.626 m)   Wt 261 lb (118.4 kg)   LMP  11/12/2023 (Exact Date)   BMI 44.80 kg/m²     Physical Exam  Vitals reviewed.   Constitutional:       General: She is not in acute distress.     Appearance: Normal appearance. She is obese.   HENT:      Head: Normocephalic.   Eyes:      Conjunctiva/sclera: Conjunctivae normal.   Cardiovascular:      Rate and Rhythm: Normal rate.   Pulmonary:      Effort: Pulmonary effort is normal.   Abdominal:      General: Bowel sounds are normal.      Palpations: Abdomen is soft. There is no hepatomegaly or mass.      Tenderness: There is no abdominal tenderness.   Musculoskeletal:      Cervical back: Neck supple.   Skin:     Findings: No rash.   Psychiatric:         Mood and Affect: Mood normal.         LABORATORY RESULTS:   No results found for: \"URCOLOR\", \"URCLA\", \"URINELEUK\", \"URINENITRITE\", \"URINEBLOOD\"   Results for orders placed or performed during the hospital encounter of 10/28/23   Specimen to Pathology Tissue   Result Value Ref Range    Case Report       Surgical Pathology                                Case: [id number]                                  Authorizing Provider:  Ollie Whalen,  Collected:           10/28/2023 07:54 AM                                 MD                                                                           Ordering Location:     [facility]          Received:            10/28/2023 09:44 AM                                 [facility]                                                         Pathologist:           Tiffany Umanzor MD                                                        Specimens:   A) - Colon transverse, polyp                                                                        B) - Colon ascending                                                                       Final Diagnosis:         A. Transverse colon polyp:  Tubular adenoma.    B. Ascending colon polyp:   Tubular adenoma fragments.        Embedded Images      Clinical Information        Z12.11 Screening For Colon Cancer.         Gross Description       Specimen A is labeled \"Destin Smithrez, transverse colon polyp\" received in formalin. The specimen consists of two fragments of pink-tan soft tissue measuring in aggregate 1.2 x 0.7 x 0.2 cm. Submitted entirely in cassette A1.      Specimen B  is labeled \"Destin Beasley, ascending colon\" received in formalin. The specimen consists of two fragments of pink-tan soft tissue measuring in aggregate 2.1 x 1.4 x 0.3 cm. Submitted entirely in cassette B1. (jq)    Tiffany Umanzor M.D./[facility]       Interpretation Benign     POCT Pregnancy, Urine   Result Value Ref Range    POCT Urine Pregnancy Negative Negative   POCT Glucose   Result Value Ref Range    POC Glucose  107 (H) 70 - 99 mg/dL     EKG / Spirometry : -     Radiology: No results found.     ASSESSMENT/PLAN:   Assessment   Encounter Diagnoses   Name Primary?    Acute gastritis without hemorrhage, unspecified gastritis type Yes    Type 2 diabetes mellitus without complication, without long-term current use of insulin ([facility])     Class 3 severe obesity due to excess calories with serious comorbidity and body mass index (BMI) of 45.0 to 49.9 in adult ([facility])     Arm pain, anterior, right        1. Acute gastritis without hemorrhage, unspecified gastritis type    MEDICATIONS:   CPM        LABORATORY & ORDERS:   Orders Placed This Encounter   Procedures    Helicobacter Pylori Breath Test   RECOMMENDATIONS given include: Patient was reassured of  her medical condition and all questions and concerns were answered. Patient was informed to please, call our office with any new or further questions or concerns that may come up in the near future. Notify Dr Weir or the [facility] if there is a deterioration or worsening of the medical condition. Also, inform the doctor with any new symptoms or medications' side effects.  Weight loss plan.     FOLLOW-UP: Schedule a follow-up visit in 2 months.          2. Type 2 diabetes mellitus without complication, without long-term current use of insulin ([facility])  3. Class 3 severe obesity due to excess calories with serious comorbidity and body mass index (BMI) of 45.0 to 49.9 in adult ([facility])      MEDICATIONS:   Requested Prescriptions     Signed Prescriptions Disp Refills    Tirzepatide (MOUNJARO) 10 MG/0.5ML Subcutaneous Solution Pen-injector 0.5 mL 5     Sig: Inject 10 mg into the skin once a week.     RECOMMENDATIONS given include: Please, call our office with any questions or concerns. Notify Dr Hunter or the [facility] if there is a development of any new medical condition. Stop medication immediatley if she believes or becomes pregnant. Also, inform the doctor with any new symptoms or medications' side effects. Patient was informed to follow a low carbohydrate, low calories diet and to maintain a  Cardiovascular exercise for 60 minutes 3-5 times a week. Consider a  if experience difficult keep track with exercise or staying at task.  Attempt to keep a schedule that includes an adequate sleep-work-physical exercise balance. Advised to increase water intake especially just before meals. Patient was educated that this will not be a temporary change, but a life time, life style change.    FOLLOW-UP: Schedule a follow-up visit in 2 months.      .     4. Arm pain, anterior, right    Only take Acetaminophen prn  RICE therapy.  RTC prn          Orders This Visit:  Orders Placed This Encounter   Procedures    Helicobacter Pylori Breath Test       Meds This Visit:  Requested Prescriptions     Signed Prescriptions Disp Refills    Tirzepatide (MOUNJARO) 10 MG/0.5ML Subcutaneous Solution Pen-injector 0.5 mL 5     Sig: Inject 10 mg into the skin once a week.       Imaging & Referrals:  None         BRANDEN HUNTER MD

## 2024-01-25 LAB — H PYLORI BREATH TEST: NEGATIVE

## 2024-02-19 ENCOUNTER — TELEPHONE (OUTPATIENT)
Dept: FAMILY MEDICINE CLINIC | Facility: CLINIC | Age: 49
End: 2024-02-19

## 2024-02-19 NOTE — TELEPHONE ENCOUNTER
Patient is requesting an order for a continuous glucose monitor. Patient checks sugar 8 times a day and is starting to experience discomfort in her fingers. Please advise       [facility] - [place], [place] - [address]

## 2024-02-21 RX ORDER — PROCHLORPERAZINE 25 MG/1
1 SUPPOSITORY RECTAL CONTINUOUS
Qty: 1 EACH | Refills: 2 | Status: SHIPPED | OUTPATIENT
Start: 2024-02-21

## 2024-02-21 RX ORDER — PROCHLORPERAZINE 25 MG/1
1 SUPPOSITORY RECTAL
Qty: 3 EACH | Refills: 11 | Status: SHIPPED | OUTPATIENT
Start: 2024-02-21

## 2024-02-21 NOTE — TELEPHONE ENCOUNTER
Patient calling back, informed of below.  Advised patient call her insurance plan to see if covered. She says she has and she feels it will be covered.     Patient spouse has same insurance plan she has and it's covered for him. He has Dexcom G6 model and is asking that the same be ordered and sent for her to the pharmacy.   When call transferred there was  on the line   ID [id number]

## 2024-02-27 ENCOUNTER — TELEPHONE (OUTPATIENT)
Dept: FAMILY MEDICINE CLINIC | Facility: CLINIC | Age: 49
End: 2024-02-27

## 2024-02-27 NOTE — TELEPHONE ENCOUNTER
TRX code [id number]       Continuous Blood Gluc Transmit (DEXCOM G6 TRANSMITTER) Does not apply Misc, 1 Units continuous., Disp: 1 each, Rfl: 2

## 2024-02-28 ENCOUNTER — NURSE TRIAGE (OUTPATIENT)
Dept: FAMILY MEDICINE CLINIC | Facility: CLINIC | Age: 49
End: 2024-02-28

## 2024-02-28 NOTE — TELEPHONE ENCOUNTER
Action Requested: Summary for Provider     []  Critical Lab, Recommendations Needed  [] Need Additional Advice  []   FYI    []   Need Orders  [] Need Medications Sent to Pharmacy  []  Other     SUMMARY: Per protocol: OV    Future Appointments   Date Time Provider Department Center   3/1/2024 10:00 AM Carter Weir MD [facility]  ADO   3/16/2024  8:00 AM ADO [facility] RM1 ADO [facility] EM [place]   4/20/2024  9:00 AM [facility]     Reason for call: Vaginal Bleeding  Onset: Data Unavailable    With language line  [place]  ID # [id number] patient states that her last menstrual period was last month. She has been bleeding for 2 weeks and passing blood clots.        Reason for Disposition   Periods last > 7 days    Protocols used: Vaginal Bleeding - [id number]-Y-TD

## 2024-02-28 NOTE — TELEPHONE ENCOUNTER
Please review, protocol failed/No protocol.    Should patient still be taking? No other labs are pended.    Component  Ref Range & Units 1/14/23  1:33 PM   Vitamin D, 25OH, Total  30.0 - 100.0 ng/mL 49.1     Requested Prescriptions   Pending Prescriptions Disp Refills    ERGOCALCIFEROL 1.25 MG (63422 UT) Oral Cap [Pharmacy Med Name: Vitamin D (Ergocalciferol) 1.25 MG (39770 UT) Oral Capsule] 12 capsule 0     Sig: Take 1 capsule by mouth once a week       There is no refill protocol information for this order            Future Appointments         Provider Department Appt Notes    In 2 weeks ADO DEXA RM1; ADO YULIA RM1 [facility]     In 1 month Carter Weir MD [facility] Follow up          Recent Outpatient Visits              1 month ago Acute gastritis without hemorrhage, unspecified gastritis type    [facility]    2 months ago Type 2 diabetes mellitus without complication, without long-term current use of insulin ([facility])    [facility]    [facility]    3 months ago Chronic cough    [facility] Angel Baum MD    Office Visit    3 months ago Subacute cough    [facility]    [facility] Visit    4 months ago Pharyngitis due to Streptococcus species    [facility] Carter White MD    Telemedicine

## 2024-02-29 RX ORDER — ERGOCALCIFEROL 1.25 MG/1
50000 CAPSULE ORAL WEEKLY
Qty: 12 CAPSULE | Refills: 0 | Status: SHIPPED | OUTPATIENT
Start: 2024-02-29

## 2024-03-01 ENCOUNTER — LAB ENCOUNTER (OUTPATIENT)
Dept: LAB | Age: 49
End: 2024-03-01
Attending: FAMILY MEDICINE
Payer: COMMERCIAL

## 2024-03-01 ENCOUNTER — OFFICE VISIT (OUTPATIENT)
Dept: FAMILY MEDICINE CLINIC | Facility: CLINIC | Age: 49
End: 2024-03-01

## 2024-03-01 VITALS
HEIGHT: 64 IN | DIASTOLIC BLOOD PRESSURE: 70 MMHG | WEIGHT: 240.81 LBS | SYSTOLIC BLOOD PRESSURE: 111 MMHG | BODY MASS INDEX: 41.11 KG/M2 | HEART RATE: 72 BPM

## 2024-03-01 DIAGNOSIS — N92.1 MENORRHAGIA WITH IRREGULAR CYCLE: ICD-10-CM

## 2024-03-01 DIAGNOSIS — E11.9 TYPE 2 DIABETES MELLITUS WITHOUT COMPLICATION, WITHOUT LONG-TERM CURRENT USE OF INSULIN (HCC): Primary | ICD-10-CM

## 2024-03-01 DIAGNOSIS — E11.9 TYPE 2 DIABETES MELLITUS WITHOUT COMPLICATION, WITHOUT LONG-TERM CURRENT USE OF INSULIN (HCC): ICD-10-CM

## 2024-03-01 DIAGNOSIS — E66.01 CLASS 3 SEVERE OBESITY DUE TO EXCESS CALORIES WITH SERIOUS COMORBIDITY AND BODY MASS INDEX (BMI) OF 45.0 TO 49.9 IN ADULT (HCC): ICD-10-CM

## 2024-03-01 DIAGNOSIS — E03.9 ACQUIRED HYPOTHYROIDISM: ICD-10-CM

## 2024-03-01 DIAGNOSIS — I10 PRIMARY HYPERTENSION: ICD-10-CM

## 2024-03-01 LAB
ALBUMIN SERPL-MCNC: 4.6 G/DL (ref 3.2–4.8)
ALBUMIN/GLOB SERPL: 1.4 {RATIO} (ref 1–2)
ALP LIVER SERPL-CCNC: 77 U/L
ALT SERPL-CCNC: 50 U/L
ANION GAP SERPL CALC-SCNC: 6 MMOL/L (ref 0–18)
AST SERPL-CCNC: 45 U/L (ref ?–34)
BILIRUB SERPL-MCNC: 0.5 MG/DL (ref 0.3–1.2)
BUN BLD-MCNC: 14 MG/DL (ref 9–23)
BUN/CREAT SERPL: 21.5 (ref 10–20)
CALCIUM BLD-MCNC: 9.7 MG/DL (ref 8.7–10.4)
CHLORIDE SERPL-SCNC: 105 MMOL/L (ref 98–112)
CO2 SERPL-SCNC: 28 MMOL/L (ref 21–32)
CREAT BLD-MCNC: 0.65 MG/DL
CREAT UR-SCNC: 50 MG/DL
EGFRCR SERPLBLD CKD-EPI 2021: 108 ML/MIN/1.73M2 (ref 60–?)
FASTING STATUS PATIENT QL REPORTED: YES
GLOBULIN PLAS-MCNC: 3.2 G/DL (ref 2.8–4.4)
GLUCOSE BLD-MCNC: 73 MG/DL (ref 70–99)
MICROALBUMIN UR-MCNC: <0.3 MG/DL
OSMOLALITY SERPL CALC.SUM OF ELEC: 287 MOSM/KG (ref 275–295)
POTASSIUM SERPL-SCNC: 4 MMOL/L (ref 3.5–5.1)
PROT SERPL-MCNC: 7.8 G/DL (ref 5.7–8.2)
SODIUM SERPL-SCNC: 139 MMOL/L (ref 136–145)
TSI SER-ACNC: 2.74 MIU/ML (ref 0.55–4.78)

## 2024-03-01 PROCEDURE — 36415 COLL VENOUS BLD VENIPUNCTURE: CPT

## 2024-03-01 PROCEDURE — 99213 OFFICE O/P EST LOW 20 MIN: CPT | Performed by: FAMILY MEDICINE

## 2024-03-01 PROCEDURE — 3008F BODY MASS INDEX DOCD: CPT | Performed by: FAMILY MEDICINE

## 2024-03-01 PROCEDURE — 80053 COMPREHEN METABOLIC PANEL: CPT

## 2024-03-01 PROCEDURE — 83036 HEMOGLOBIN GLYCOSYLATED A1C: CPT | Performed by: FAMILY MEDICINE

## 2024-03-01 PROCEDURE — 3044F HG A1C LEVEL LT 7.0%: CPT | Performed by: FAMILY MEDICINE

## 2024-03-01 PROCEDURE — 82043 UR ALBUMIN QUANTITATIVE: CPT

## 2024-03-01 PROCEDURE — 82570 ASSAY OF URINE CREATININE: CPT

## 2024-03-01 PROCEDURE — 84443 ASSAY THYROID STIM HORMONE: CPT

## 2024-03-01 PROCEDURE — 3078F DIAST BP <80 MM HG: CPT | Performed by: FAMILY MEDICINE

## 2024-03-01 PROCEDURE — 3061F NEG MICROALBUMINURIA REV: CPT | Performed by: FAMILY MEDICINE

## 2024-03-01 PROCEDURE — 3074F SYST BP LT 130 MM HG: CPT | Performed by: FAMILY MEDICINE

## 2024-03-01 RX ORDER — NORETHINDRONE AND ETHINYL ESTRADIOL 7 DAYS X 3
1 KIT ORAL DAILY
Qty: 1 EACH | Refills: 1 | Status: SHIPPED | OUTPATIENT
Start: 2024-03-01 | End: 2024-03-31

## 2024-03-01 NOTE — PROGRESS NOTES
3/1/2024  9:56 AM    Eva Beasley is a 49 year old female.    Chief complaint(s):   Chief Complaint   Patient presents with    Menstrual Problem     X2 weeks    Diabetres, obese  HPI:     Eva Beasley primary complaint is regarding multiple complaints.     Patient Eva Beasley is a 49 year old female is here to be evaluated for type 2 diabetes.  Specifically, female has type 2, insulin none requiring diabetes. Compliance with treatment has been fair.  Patient's diabetes was first diagnosed July 2022.  Patient follows a 1800 calorie ADA diet.  Patient report experiencing the following diabetes related symptoms; Negative for polyuria, Negative for polydipsia, Negative for blurred vision.  Depression symptoms include none.  Tobacco screen: none  smoker.  Current meds include :  oral hypoglycemic include: Actose  30 mg  insulin/injectable : Mounjaro 10 mg Q wk .  Hypoglycemia severity is not applicable.she reports home blood glucose readings have been 160 (#s) and believes  having fair glucose control.  Most recent lab results include glycohemoglobin  A1c 6.0 %, microalbuminuria have been .  In regard to preventative care, her last ophthalmology exam was in < 12 months ago.  Opthalmic evaluation have shown none pathology.  Concurrent relative health problems include POD.    In addition patient is also due for follow-up regarding hypothyroidism.  Continue with levothyroxine 137 mcg a day and also taking her antihypertensive medication amlodipine.  She stopped taking her medication a few days ago and her blood pressure remains since she lost weight.    Patient's last menstrual period was 02/15/2024 (exact date). It lasted 16 days with blood cloths. Previous ultrasound showed uterine fibroids.    HISTORY:  Past Medical History:   Diagnosis Date    Hypothyroidism     Polycystic ovarian disease     Rheumatic fever       Past Surgical History:    Procedure Laterality Date    APPENDECTOMY      COLONOSCOPY N/A 10/28/2023    Procedure: COLONOSCOPY;  Surgeon: Ollie Whalen MD;  Location: [facility] ENDOSCOPY      Family History   Problem Relation Age of Onset    Circulatory Problems Mother         Peripheral vascular disease    Cancer Paternal Grandfather         Liver cancer     Alcohol and Other Disorders Associated Paternal Grandfather         Alcoholism    Diabetes Paternal Grandmother         Type 2      Social History:   Social History     Socioeconomic History    Marital status:    Tobacco Use    Smoking status: Never     Passive exposure: Never    Smokeless tobacco: Never   Vaping Use    Vaping Use: Never used   Substance and Sexual Activity    Alcohol use: No     Alcohol/week: 0.0 standard drinks of alcohol    Drug use: No     Comment: No history of illicit substance abuse        Immunizations:   Immunization History   Administered Date(s) Administered    FLULAVAL 6 months & older 0.5 ml Prefilled syringe (16773) 12/04/2017, 11/02/2018, 10/22/2019, 10/23/2021, 01/14/2023    FLUZONE 6 months and older PFS 0.5 ml (51078) 10/01/2016, 12/04/2017, 11/02/2018, 12/16/2023    Flulaval, 3 Years & >, IM 10/16/2007, 09/28/2010    Fluvirin, 3 Years & >, Im 12/13/2005, 11/02/2006, 11/06/2012    Influenza 11/11/2008, 10/10/2009, 09/23/2014, 09/16/2015    Influenza Virus Vaccine, H1N1 12/19/2009    TD 02/29/2004    TDAP 07/25/2013, 01/14/2023       Medications (Active prior to today's visit):  Current Outpatient Medications   Medication Sig Dispense Refill    Norethin-Eth Estrad Triphasic (ORTHO-NOVUM 7/7/7, 28,) 0.5/0.75/1-35 MG-MCG Oral Tab Take 1 tablet by mouth daily. 1 each 1    ergocalciferol 1.25 MG (89439 UT) Oral Cap Take 1 capsule (50,000 Units total) by mouth once a week. 12 capsule 0    Continuous Blood Gluc Transmit (DEXCOM G6 TRANSMITTER) Does not apply Misc 1 Units continuous. 1 each 2    Continuous Blood Gluc Sensor (DEXCOM G6 SENSOR)  Does not apply Misc 1 each Every 10 days. Use as directed every 10 days 3 each 11    Tirzepatide (MOUNJARO) 10 MG/0.5ML Subcutaneous Solution Pen-injector Inject 10 mg into the skin once a week. 0.5 mL 5    levothyroxine 137 MCG Oral Tab Take 137 mcg by mouth before breakfast. 90 tablet 3    Loratadine-Pseudoephedrine (EQL ALLERGY/CONGESTION RELIEF OR) Take 1 tablet by mouth Q12H.      pantoprazole 40 MG Oral Tab EC Take 1 tablet (40 mg total) by mouth daily. 30 tablet 3    montelukast 10 MG Oral Tab Take 1 tablet (10 mg total) by mouth nightly. 30 tablet 3    fluticasone propionate 50 MCG/ACT Nasal Suspension 1 spray by Nasal route 2 (two) times daily. 16 g 3    Diclofenac-miSOPROStol 50-0.2 MG Oral Tab EC Take 1 tablet by mouth 2 (two) times daily with meals. 180 tablet 2    escitalopram 10 MG Oral Tab Take 1 tablet (10 mg total) by mouth every morning. 90 tablet 3    Iswmsbidd-Dhgcefhja-Aafamccabv (METAFOLBIC PLUS) 6-2-600 MG Oral Tab Take 1 tablet by mouth daily. 180 tablet 2    amLODIPine 5 MG Oral Tab Take 1 tablet (5 mg total) by mouth daily. 90 tablet 3    Glucose Blood (RELION TRUE METRIX TEST STRIPS) In Vitro Strip Use  each 5    simethicone 125 MG Oral Chew Tab Chew 2 tablets (250 mg total) by mouth every 6 (six) hours as needed for FLATULENCE. 60 tablet 1    Mometasone Furoate 0.1 % External Cream Apply to affected area(s) BID (Patient not taking: Reported on 1/24/2024) 30 g 1       Allergies:  Allergies   Allergen Reactions    Contrave [Naltrexone-Bupropion Hcl Er] DIZZINESS and OTHER (SEE COMMENTS)     Headaches     Victoza OTHER (SEE COMMENTS)     Abdominal pain         ROS:   Review of Systems   Constitutional:  Negative for appetite change and fever.   Eyes:  Negative for visual disturbance.   Respiratory:  Negative for shortness of breath.    Cardiovascular:  Negative for chest pain.   Gastrointestinal:  Negative for abdominal pain, nausea and vomiting.   Endocrine: Negative for polydipsia  and polyuria.   Musculoskeletal:  Negative for back pain.   Skin:  Negative for rash.   Neurological:  Negative for dizziness and headaches.       PHYSICAL EXAM:   VS: /70   Pulse 72   Ht 5' 4\" (1.626 m)   Wt 240 lb 12.8 oz (109.2 kg)   LMP 01/03/2024 (Exact Date)   BMI 41.33 kg/m²     Physical Exam  Vitals reviewed.   Constitutional:       General: She is not in acute distress.     Appearance: Normal appearance.   HENT:      Head: Normocephalic.   Eyes:      Conjunctiva/sclera: Conjunctivae normal.   Cardiovascular:      Rate and Rhythm: Normal rate.   Pulmonary:      Effort: Pulmonary effort is normal.   Musculoskeletal:      Cervical back: Neck supple.   Skin:     Findings: No rash.   Psychiatric:         Mood and Affect: Mood normal.         LABORATORY RESULTS:       EKG / Spirometry : -     Radiology: No results found.     ASSESSMENT/PLAN:   Assessment   Encounter Diagnoses   Name Primary?    Type 2 diabetes mellitus without complication, without long-term current use of insulin ([facility]) Yes    Class 3 severe obesity due to excess calories with serious comorbidity and body mass index (BMI) of 45.0 to 49.9 in adult (HCC)     Menorrhagia with irregular cycle     Acquired hypothyroidism     Primary hypertension        DIABETES A&P    LABORATORY & ORDERS: Blood test(s) ordered today ;   Orders Placed This Encounter   Procedures    POC Glycohemoglobin [23421]    Microalb/Creat Ratio, Random Urine    Comp Metabolic Panel (14)    TSH W Reflex To Free T4     Additional orders include:   MEDICATIONS:    Norethin-Eth Estrad Triphasic (ORTHO-NOVUM 7/7/7, 28,) 0.5/0.75/1-35 MG-MCG Oral Tab, Take 1 tablet by mouth daily., Disp: 1 each, Rfl: 1    ergocalciferol 1.25 MG (47630 UT) Oral Cap, Take 1 capsule (50,000 Units total) by mouth once a week., Disp: 12 capsule, Rfl: 0    Continuous Blood Gluc Transmit (DEXCOM G6 TRANSMITTER) Does not apply Misc, 1 Units continuous., Disp: 1 each, Rfl: 2    Continuous Blood Gluc  Sensor (DEXCOM G6 SENSOR) Does not apply Misc, 1 each Every 10 days. Use as directed every 10 days, Disp: 3 each, Rfl: 11    Tirzepatide (MOUNJARO) 10 MG/0.5ML Subcutaneous Solution Pen-injector, Inject 10 mg into the skin once a week., Disp: 0.5 mL, Rfl: 5    levothyroxine 137 MCG Oral Tab, Take 137 mcg by mouth before breakfast., Disp: 90 tablet, Rfl: 3    Loratadine-Pseudoephedrine (EQL ALLERGY/CONGESTION RELIEF OR), Take 1 tablet by mouth Q12H., Disp: , Rfl:     pantoprazole 40 MG Oral Tab EC, Take 1 tablet (40 mg total) by mouth daily., Disp: 30 tablet, Rfl: 3    montelukast 10 MG Oral Tab, Take 1 tablet (10 mg total) by mouth nightly., Disp: 30 tablet, Rfl: 3    fluticasone propionate 50 MCG/ACT Nasal Suspension, 1 spray by Nasal route 2 (two) times daily., Disp: 16 g, Rfl: 3    Diclofenac-miSOPROStol 50-0.2 MG Oral Tab EC, Take 1 tablet by mouth 2 (two) times daily with meals., Disp: 180 tablet, Rfl: 2    escitalopram 10 MG Oral Tab, Take 1 tablet (10 mg total) by mouth every morning., Disp: 90 tablet, Rfl: 3    Fljfwsvqt-Gsijnruop-Ktzverpgnx (METAFOLBIC PLUS) 6-2-600 MG Oral Tab, Take 1 tablet by mouth daily., Disp: 180 tablet, Rfl: 2    amLODIPine 5 MG Oral Tab, Take 1 tablet (5 mg total) by mouth daily., Disp: 90 tablet, Rfl: 3    Glucose Blood (RELION TRUE METRIX TEST STRIPS) In Vitro Strip, Use BID, Disp: 100 each, Rfl: 5    simethicone 125 MG Oral Chew Tab, Chew 2 tablets (250 mg total) by mouth every 6 (six) hours as needed for FLATULENCE., Disp: 60 tablet, Rfl: 1    Mometasone Furoate 0.1 % External Cream, Apply to affected area(s) BID (Patient not taking: Reported on 1/24/2024), Disp: 30 g, Rfl: 1.  Requested Prescriptions     Signed Prescriptions Disp Refills    Norethin-Eth Estrad Triphasic (ORTHO-NOVUM 7/7/7, 28,) 0.5/0.75/1-35 MG-MCG Oral Tab 1 each 1     Sig: Take 1 tablet by mouth daily.      Place Amlodipine on hold.   REFERRALS:       Procedures    POC Glycohemoglobin [[id number]]     Microalb/Creat Ratio, Random Urine    Comp Metabolic Panel (14)    TSH W Reflex To Free T4    EVALUATE & TREAT, OBG (INTERNAL)     RECOMMENDATIONS: instructed in use of glucometer ( check fasting glucose daily), return for training in administering insulin injections, adherence to an 1800 calorie ADA diet,  20 pound weight loss, a graduated exercise program, HgbA1C level checked quarterly, daily foot self-inspection, need for yearly flu shots, and avoid all sodas, juices, candy, chocolates, cakes, ice cream, etc.      FOLLOW-UP: Schedule a follow-up visit in 3 months.       COUNSELING: The patient was counseled concerning the relationship between diabetes control and macrovascular disease including cardiovascular, cerebrovascular and peripheral vascular disease. The patient was counseled concerning the relationship between diabetes control and retinopathy, nephropathy, and neuropathy. Advised as to the targets of pre-meal glucoses ( mg/dl) and post meal glucoses (<140-160 mg/dl) Home glucose testing discussed. The A1c target of <7% according to ADA and <6.5% according to AACE were discussed.                Orders This Visit:  Orders Placed This Encounter   Procedures    POC Glycohemoglobin [[id number]]    Microalb/Creat Ratio, Random Urine    Comp Metabolic Panel (14)    TSH W Reflex To Free T4       Meds This Visit:  Requested Prescriptions     Signed Prescriptions Disp Refills    Norethin-Eth Estrad Triphasic (ORTHO-NOVUM 7/7/7, 28,) 0.5/0.75/1-35 MG-MCG Oral Tab 1 each 1     Sig: Take 1 tablet by mouth daily.       Imaging & Referrals:  EVALUATE & TREAT, OBG (INTERNAL)         BRANDEN HUNTER MD

## 2024-03-04 LAB
CARTRIDGE LOT#: ABNORMAL NUMERIC
HEMOGLOBIN A1C: 6 % (ref 4.3–5.6)

## 2024-03-16 ENCOUNTER — TELEPHONE (OUTPATIENT)
Dept: FAMILY MEDICINE CLINIC | Facility: CLINIC | Age: 49
End: 2024-03-16

## 2024-03-16 ENCOUNTER — HOSPITAL ENCOUNTER (OUTPATIENT)
Dept: MAMMOGRAPHY | Age: 49
Discharge: HOME | End: 2024-03-16
Attending: FAMILY MEDICINE
Payer: COMMERCIAL

## 2024-03-16 DIAGNOSIS — Z12.31 ENCOUNTER FOR SCREENING MAMMOGRAM FOR MALIGNANT NEOPLASM OF BREAST: ICD-10-CM

## 2024-03-16 PROCEDURE — 77063 BREAST TOMOSYNTHESIS BI: CPT | Performed by: FAMILY MEDICINE

## 2024-03-16 PROCEDURE — 77067 SCR MAMMO BI INCL CAD: CPT | Performed by: FAMILY MEDICINE

## 2024-03-16 NOTE — TELEPHONE ENCOUNTER
Patient's pharmacies around her are all out of:    Tirzepatide (MOUNJARO) 10 MG/0.5ML Subcutaneous Solution Pen-injector 0.5 mL 5 1/24/2024 --   Sig:   Inject 10 mg into the skin once a week.         Patient is asking if the prescription dosage can be changed to 5MG or 7.5MG, instead, since the pharmacies have that in stock.

## 2024-03-18 RX ORDER — TIRZEPATIDE 12.5 MG/.5ML
12.5 INJECTION, SOLUTION SUBCUTANEOUS WEEKLY
Qty: 2 ML | Refills: 1 | Status: SHIPPED | OUTPATIENT
Start: 2024-03-18

## 2024-03-23 ENCOUNTER — TELEPHONE (OUTPATIENT)
Dept: FAMILY MEDICINE CLINIC | Facility: CLINIC | Age: 49
End: 2024-03-23

## 2024-03-23 DIAGNOSIS — E11.9 CONTROLLED TYPE 2 DIABETES MELLITUS WITHOUT COMPLICATION, WITHOUT LONG-TERM CURRENT USE OF INSULIN (HCC): Primary | ICD-10-CM

## 2024-03-23 RX ORDER — TIRZEPATIDE 5 MG/.5ML
10 INJECTION, SOLUTION SUBCUTANEOUS WEEKLY
Qty: 4 ML | Refills: 0 | Status: SHIPPED | OUTPATIENT
Start: 2024-03-23 | End: 2024-04-20

## 2024-03-23 NOTE — TELEPHONE ENCOUNTER
Pt calling stating that they are out of stock on 10 mg and 12.5 mg mounjaro at all local pharmacies. Pt states the insurance will cover take 2- 5mg injections to equal the 10 mg.  Sent to [facility].

## 2024-03-26 ENCOUNTER — OFFICE VISIT (OUTPATIENT)
Dept: OBGYN CLINIC | Facility: CLINIC | Age: 49
End: 2024-03-26

## 2024-03-26 VITALS
BODY MASS INDEX: 41 KG/M2 | WEIGHT: 237 LBS | DIASTOLIC BLOOD PRESSURE: 68 MMHG | HEART RATE: 72 BPM | SYSTOLIC BLOOD PRESSURE: 111 MMHG

## 2024-03-26 DIAGNOSIS — Z12.4 SCREENING FOR CERVICAL CANCER: ICD-10-CM

## 2024-03-26 DIAGNOSIS — N92.1 MENORRHAGIA WITH IRREGULAR CYCLE: Primary | ICD-10-CM

## 2024-03-26 DIAGNOSIS — D21.9 FIBROID: ICD-10-CM

## 2024-03-26 DIAGNOSIS — E66.01 CLASS 3 SEVERE OBESITY DUE TO EXCESS CALORIES WITH SERIOUS COMORBIDITY AND BODY MASS INDEX (BMI) OF 40.0 TO 44.9 IN ADULT (HCC): ICD-10-CM

## 2024-03-26 PROCEDURE — 99204 OFFICE O/P NEW MOD 45 MIN: CPT | Performed by: OBSTETRICS & GYNECOLOGY

## 2024-03-26 PROCEDURE — 3078F DIAST BP <80 MM HG: CPT | Performed by: OBSTETRICS & GYNECOLOGY

## 2024-03-26 PROCEDURE — 3074F SYST BP LT 130 MM HG: CPT | Performed by: OBSTETRICS & GYNECOLOGY

## 2024-03-26 RX ORDER — MONTELUKAST SODIUM 10 MG/1
10 TABLET ORAL NIGHTLY
Qty: 30 TABLET | Refills: 0 | Status: SHIPPED | OUTPATIENT
Start: 2024-03-26

## 2024-03-26 NOTE — PROGRESS NOTES
HPI:    Patient ID: Eva Beasley is a 49 year old year old female.    [facility]  Gynecology referral  GYN problem menorrhagia with irregular cycle  49-year-old  1 para 1 last menstrual period .  Patient with hypertension, obesity, and diabetes.  She has a history of a uterine fibroid noted on pelvic ultrasound in 2021 measuring 2.6 cm at the anterior wall.  Review of Systems   Constitutional:  Positive for fatigue.   Cardiovascular: Negative.    Gastrointestinal: Negative.    Genitourinary: Negative.    Skin: Negative.    Neurological: Negative.    Psychiatric/Behavioral: Negative.     All other systems reviewed and are negative.       Current Outpatient Medications   Medication Sig Dispense Refill    Tirzepatide (MOUNJARO) 5 MG/0.5ML Subcutaneous Solution Pen-injector Inject 10 mg into the skin once a week for 28 days. 4 mL 0    Tirzepatide (MOUNJARO) 12.5 MG/0.5ML Subcutaneous Solution Pen-injector Inject 12.5 mg into the skin once a week. 2 mL 1    Norethin-Eth Estrad Triphasic (ORTHO-NOVUM , 28,) 0.5/0.75/1-35 MG-MCG Oral Tab Take 1 tablet by mouth daily. 1 each 1    ergocalciferol 1.25 MG (76188 UT) Oral Cap Take 1 capsule (50,000 Units total) by mouth once a week. 12 capsule 0    Continuous Blood Gluc Transmit (DEXCOM G6 TRANSMITTER) Does not apply Misc 1 Units continuous. 1 each 2    Continuous Blood Gluc Sensor (DEXCOM G6 SENSOR) Does not apply Misc 1 each Every 10 days. Use as directed every 10 days 3 each 11    Tirzepatide (MOUNJARO) 10 MG/0.5ML Subcutaneous Solution Pen-injector Inject 10 mg into the skin once a week. 0.5 mL 5    levothyroxine 137 MCG Oral Tab Take 137 mcg by mouth before breakfast. 90 tablet 3    Loratadine-Pseudoephedrine (EQL ALLERGY/CONGESTION RELIEF OR) Take 1 tablet by mouth Q12H.      pantoprazole 40 MG Oral Tab EC Take 1 tablet (40 mg total) by mouth daily. 30 tablet 3    montelukast 10 MG Oral Tab Take 1 tablet (10 mg total) by  mouth nightly. 30 tablet 3    fluticasone propionate 50 MCG/ACT Nasal Suspension 1 spray by Nasal route 2 (two) times daily. 16 g 3    Diclofenac-miSOPROStol 50-0.2 MG Oral Tab EC Take 1 tablet by mouth 2 (two) times daily with meals. 180 tablet 2    escitalopram 10 MG Oral Tab Take 1 tablet (10 mg total) by mouth every morning. 90 tablet 3    Ykcnntqaw-Rtcttquuv-Ublmfwgfkd (METAFOLBIC PLUS) 6-2-600 MG Oral Tab Take 1 tablet by mouth daily. 180 tablet 2    amLODIPine 5 MG Oral Tab Take 1 tablet (5 mg total) by mouth daily. 90 tablet 3    Mometasone Furoate 0.1 % External Cream Apply to affected area(s) BID (Patient not taking: Reported on 1/24/2024) 30 g 1    Glucose Blood (RELION TRUE METRIX TEST STRIPS) In Vitro Strip Use  each 5    simethicone 125 MG Oral Chew Tab Chew 2 tablets (250 mg total) by mouth every 6 (six) hours as needed for FLATULENCE. 60 tablet 1       Past Medical History:   Diagnosis Date    Hypothyroidism     Polycystic ovarian disease     Rheumatic fever        Past Surgical History:   Procedure Laterality Date    APPENDECTOMY      COLONOSCOPY N/A 10/28/2023    Procedure: COLONOSCOPY;  Surgeon: Ollie Whalen MD;  Location: [facility] ENDOSCOPY       Family History   Problem Relation Age of Onset    Circulatory Problems Mother         Peripheral vascular disease    Cancer Paternal Grandfather         Liver cancer     Alcohol and Other Disorders Associated Paternal Grandfather         Alcoholism    Diabetes Paternal Grandmother         Type 2       Social History     Socioeconomic History    Marital status:      Spouse name: Not on file    Number of children: Not on file    Years of education: Not on file    Highest education level: Not on file   Occupational History    Not on file   Tobacco Use    Smoking status: Never     Passive exposure: Never    Smokeless tobacco: Never   Vaping Use    Vaping Use: Never used   Substance and Sexual Activity    Alcohol use: No      Alcohol/week: 0.0 standard drinks of alcohol    Drug use: No     Comment: No history of illicit substance abuse    Sexual activity: Not on file   Other Topics Concern    Not on file   Social History Narrative    Not on file     Social Determinants of Health     Financial Resource Strain: Not on file   Food Insecurity: Not on file   Transportation Needs: Not on file   Physical Activity: Not on file   Stress: Not on file   Social Connections: Not on file   Housing Stability: Not on file       Physical Exam     Vitals: Wt 237 lb (107.5 kg)   LMP 03/08/2024 (Approximate)   BMI 40.68 kg/m²     Constitutional: She appears well-developed and well-nourished.     Musculoskeletal: Normal range of motion of upper and lower extremities.   Neurological: She is alert and oriented x 3.   Skin: Skin is warm without pallor.  Psychiatric: Her behavior is normal. Judgment normal.  Able to communicate verbally.    HEENT:  EOMI.  JESÚS.  Sclera anicteric.    Head: Normocephalic.  Normal hair distribution.  No lesions.  Neck: Normal range of motion.      Adenopathy:  No supraclavicular or cervical adenopathy.  Thyroid:  Normal size, shape, and position.  No masses, tenderness, or nodules.  Cardiovascular: Normal rate and regular rhythm.    Pulmonary/Chest: Effort normal.   Abdominal: Soft.  Morbidly obese with overhanging pannus.  There is no hepatosplenomegaly. There is no tenderness. There is no rebound and no CVA tenderness. No hernia. Hernia negative in the ventral area,  negative in the right inguinal area and negative in the left inguinal area.   Lymphadenopathy:        Right: No inguinal adenopathy present.        Left: No inguinal adenopathy present.       Genitourinary:   Pelvic exam was performed with patient supine and chaperone present.  External genitalia- normal.  [place]'s and [place]'s glands normal.  Urethral meatus- without lesions, mass, or discharge.  Urethra- normal without lesion, cyst, mass, or  tenderness.  Vulva- normal.  Labia majora and minora without lesions.   Vagina- normal, no lesions or discharge.  Moist and well supported.  Bladder-  nontender.  No masses.  Normal support.  No evidence of cystocele,  abnormal bladder neck mobility or evident urinary incontinence.  Cervix- smooth, normal epithelium without lesions or discharge.  No motion tenderness.   Uterus-unable to feel due to morbid obesity  Adnexa-unable to feel due to morbid obesity perineum- normal without lesions  Anus-  Normal appearing without lesions.    ASSESSMENT/PLAN:  -Menorrhagia with irregular cycles  History of uterine fibroid  Recommend blood work as ordered and pelvic ultrasound.  Endometrial biopsy  Counseled extensively  Outpatient Encounter Medications as of 3/26/2024   Medication Sig Dispense Refill    Tirzepatide (MOUNJARO) 5 MG/0.5ML Subcutaneous Solution Pen-injector Inject 10 mg into the skin once a week for 28 days. 4 mL 0    Tirzepatide (MOUNJARO) 12.5 MG/0.5ML Subcutaneous Solution Pen-injector Inject 12.5 mg into the skin once a week. 2 mL 1    Norethin-Eth Estrad Triphasic (ORTHO-NOVUM 7/7/7, 28,) 0.5/0.75/1-35 MG-MCG Oral Tab Take 1 tablet by mouth daily. 1 each 1    ergocalciferol 1.25 MG (64334 UT) Oral Cap Take 1 capsule (50,000 Units total) by mouth once a week. 12 capsule 0    Continuous Blood Gluc Transmit (DEXCOM G6 TRANSMITTER) Does not apply Misc 1 Units continuous. 1 each 2    Continuous Blood Gluc Sensor (DEXCOM G6 SENSOR) Does not apply Misc 1 each Every 10 days. Use as directed every 10 days 3 each 11    Tirzepatide (MOUNJARO) 10 MG/0.5ML Subcutaneous Solution Pen-injector Inject 10 mg into the skin once a week. 0.5 mL 5    levothyroxine 137 MCG Oral Tab Take 137 mcg by mouth before breakfast. 90 tablet 3    Loratadine-Pseudoephedrine (EQL ALLERGY/CONGESTION RELIEF OR) Take 1 tablet by mouth Q12H.      pantoprazole 40 MG Oral Tab EC Take 1 tablet (40 mg total) by mouth daily. 30 tablet 3     montelukast 10 MG Oral Tab Take 1 tablet (10 mg total) by mouth nightly. 30 tablet 3    fluticasone propionate 50 MCG/ACT Nasal Suspension 1 spray by Nasal route 2 (two) times daily. 16 g 3    Diclofenac-miSOPROStol 50-0.2 MG Oral Tab EC Take 1 tablet by mouth 2 (two) times daily with meals. 180 tablet 2    escitalopram 10 MG Oral Tab Take 1 tablet (10 mg total) by mouth every morning. 90 tablet 3    Yujsdzmco-Plyhdabof-Xybchfjrfi (METAFOLBIC PLUS) 6-2-600 MG Oral Tab Take 1 tablet by mouth daily. 180 tablet 2    amLODIPine 5 MG Oral Tab Take 1 tablet (5 mg total) by mouth daily. 90 tablet 3    Mometasone Furoate 0.1 % External Cream Apply to affected area(s) BID (Patient not taking: Reported on 1/24/2024) 30 g 1    Glucose Blood (RELION TRUE METRIX TEST STRIPS) In Vitro Strip Use  each 5    simethicone 125 MG Oral Chew Tab Chew 2 tablets (250 mg total) by mouth every 6 (six) hours as needed for FLATULENCE. 60 tablet 1     No facility-administered encounter medications on file as of 3/26/2024.

## 2024-03-27 ENCOUNTER — LAB ENCOUNTER (OUTPATIENT)
Dept: LAB | Age: 49
End: 2024-03-27
Attending: OBSTETRICS & GYNECOLOGY
Payer: COMMERCIAL

## 2024-03-27 DIAGNOSIS — N92.1 MENORRHAGIA WITH IRREGULAR CYCLE: ICD-10-CM

## 2024-03-27 LAB
BASOPHILS # BLD AUTO: 0.06 X10(3) UL (ref 0–0.2)
BASOPHILS NFR BLD AUTO: 0.9 %
DEPRECATED RDW RBC AUTO: 42.2 FL (ref 35.1–46.3)
EOSINOPHIL # BLD AUTO: 0.19 X10(3) UL (ref 0–0.7)
EOSINOPHIL NFR BLD AUTO: 2.8 %
ERYTHROCYTE [DISTWIDTH] IN BLOOD BY AUTOMATED COUNT: 12.7 % (ref 11–15)
FSH SERPL-ACNC: 13 MIU/ML
HCT VFR BLD AUTO: 39.1 %
HGB BLD-MCNC: 13.2 G/DL
HPV I/H RISK 1 DNA SPEC QL NAA+PROBE: NEGATIVE
IMM GRANULOCYTES # BLD AUTO: 0.01 X10(3) UL (ref 0–1)
IMM GRANULOCYTES NFR BLD: 0.1 %
LYMPHOCYTES # BLD AUTO: 2.14 X10(3) UL (ref 1–4)
LYMPHOCYTES NFR BLD AUTO: 31.6 %
MCH RBC QN AUTO: 30.6 PG (ref 26–34)
MCHC RBC AUTO-ENTMCNC: 33.8 G/DL (ref 31–37)
MCV RBC AUTO: 90.7 FL
MONOCYTES # BLD AUTO: 0.56 X10(3) UL (ref 0.1–1)
MONOCYTES NFR BLD AUTO: 8.3 %
NEUTROPHILS # BLD AUTO: 3.81 X10 (3) UL (ref 1.5–7.7)
NEUTROPHILS # BLD AUTO: 3.81 X10(3) UL (ref 1.5–7.7)
NEUTROPHILS NFR BLD AUTO: 56.3 %
PLATELET # BLD AUTO: 234 10(3)UL (ref 150–450)
RBC # BLD AUTO: 4.31 X10(6)UL
TSI SER-ACNC: 1.15 MIU/ML (ref 0.55–4.78)
WBC # BLD AUTO: 6.8 X10(3) UL (ref 4–11)

## 2024-03-27 PROCEDURE — 83001 ASSAY OF GONADOTROPIN (FSH): CPT

## 2024-03-27 PROCEDURE — 36415 COLL VENOUS BLD VENIPUNCTURE: CPT

## 2024-03-27 PROCEDURE — 85025 COMPLETE CBC W/AUTO DIFF WBC: CPT

## 2024-03-27 PROCEDURE — 84443 ASSAY THYROID STIM HORMONE: CPT

## 2024-03-29 DIAGNOSIS — E11.9 CONTROLLED TYPE 2 DIABETES MELLITUS WITHOUT COMPLICATION, WITHOUT LONG-TERM CURRENT USE OF INSULIN (HCC): ICD-10-CM

## 2024-03-29 RX ORDER — TIRZEPATIDE 5 MG/.5ML
10 INJECTION, SOLUTION SUBCUTANEOUS WEEKLY
Qty: 12 ML | Refills: 3 | Status: SHIPPED | OUTPATIENT
Start: 2024-03-29 | End: 2024-04-26

## 2024-03-29 NOTE — TELEPHONE ENCOUNTER
Language line #[phone] assisted with the phone call. Pt is currently using 10mg Mounjaro weekly with a 5mg/0.5ml dosage. States that she will be out of her current supply by the weekend.  Please refill keeping in mind that she is using double dose to achieve 10mg. (The 5mg is the only dosage currently available at [facility]).

## 2024-04-02 ENCOUNTER — TELEPHONE (OUTPATIENT)
Dept: FAMILY MEDICINE CLINIC | Facility: CLINIC | Age: 49
End: 2024-04-02

## 2024-04-02 NOTE — TELEPHONE ENCOUNTER
Did PA through covermy meds  KEY:  [id number]  Message received  No coverage was found  No coverage was found. Please reconfirm the patient's plan before submitting. You may also return to the dashboard and select a Medicare form if the patient has Medicare Part D coverage.    Spoke with [employer] rep [place] and they have received calls stating issues with cover my meds.  She will fax over the paper form to fill out.

## 2024-04-02 NOTE — TELEPHONE ENCOUNTER
No coverage was found. Please reconfirm the patient's plan before submitting. You may also return to the dashboard and select a Medicare form if the patient has Medicare Part D coverage.

## 2024-04-02 NOTE — TELEPHONE ENCOUNTER
Prior Authorization      Tirzepatide (MOUNJARO) 5 MG/0.5ML Subcutaneous Solution Pen-injector, Inject 10 mg into the skin once a week for 28 days., Disp: 12 mL, Rfl: 3      [id number]

## 2024-04-02 NOTE — TELEPHONE ENCOUNTER
FYI: patient insurance [facility] choice ID#[id number] have been verified in onesource :    Member is Eligible    SEARCH CRITERIA  NPI  [id number]  Subscriber ID  [id number]  Patient Date of Birth  02/10/1975  Relationship to Subscriber  Self  Eligibility Coverage Type  Health Benefit Plan Coverage  Beginning Date of Service  04/02/2024  Ending Date of Service  04/02/2024    SUBSCRIBER  Name  TRENTON GIVENS  Member ID Number  [id number]    DEPENDENT  Name  ALLISON MCMAHAN  Group Number  [id number]  Group Name  [employer]  Address  [address]    [address]    [place] [address]  Date of Birth  02/10/1975  Sex  Female  Relationship  Spouse  Plan Date  04/01/2022 - Open-ended

## 2024-04-05 NOTE — TELEPHONE ENCOUNTER
Per [employer] rep the PA is approved.  With an optimize dose of 1 (10mg/0.5ml) injector weekly instead of 2 (5mg/0.5mL) weekly.  They will fax over the approval with information.

## 2024-04-17 ENCOUNTER — OFFICE VISIT (OUTPATIENT)
Dept: ENDOCRINOLOGY CLINIC | Facility: CLINIC | Age: 49
End: 2024-04-17
Payer: COMMERCIAL

## 2024-04-17 VITALS
BODY MASS INDEX: 39.44 KG/M2 | DIASTOLIC BLOOD PRESSURE: 70 MMHG | HEIGHT: 64 IN | WEIGHT: 231 LBS | HEART RATE: 80 BPM | SYSTOLIC BLOOD PRESSURE: 110 MMHG

## 2024-04-17 DIAGNOSIS — E11.9 CONTROLLED TYPE 2 DIABETES MELLITUS WITHOUT COMPLICATION, WITHOUT LONG-TERM CURRENT USE OF INSULIN (HCC): Primary | ICD-10-CM

## 2024-04-17 LAB
GLUCOSE BLOOD: 78
GLUCOSE BLOOD: 95
TEST STRIP LOT #: NORMAL NUMERIC
TEST STRIP LOT #: NORMAL NUMERIC

## 2024-04-17 PROCEDURE — 3008F BODY MASS INDEX DOCD: CPT

## 2024-04-17 PROCEDURE — 82947 ASSAY GLUCOSE BLOOD QUANT: CPT

## 2024-04-17 PROCEDURE — 3078F DIAST BP <80 MM HG: CPT

## 2024-04-17 PROCEDURE — 99203 OFFICE O/P NEW LOW 30 MIN: CPT

## 2024-04-17 PROCEDURE — 3074F SYST BP LT 130 MM HG: CPT

## 2024-04-17 RX ORDER — BLOOD-GLUCOSE SENSOR
1 EACH MISCELLANEOUS
Qty: 2 EACH | Refills: 5 | Status: SHIPPED | OUTPATIENT
Start: 2024-04-17

## 2024-04-17 RX ORDER — TIRZEPATIDE 15 MG/.5ML
15 INJECTION, SOLUTION SUBCUTANEOUS WEEKLY
Qty: 2 ML | Refills: 4 | Status: SHIPPED | OUTPATIENT
Start: 2024-04-17

## 2024-04-17 NOTE — PROGRESS NOTES
Name: Eva Beasley  Date: 4/17/2024    Referring Physician: No ref. provider found    HISTORY OF PRESENT ILLNESS     Eva Beasley is a 49 year old female who presents for consult for diabetes mellitus. Also wants to establish care with Endocrine for PCOS and hypothyroidism.     Diagnosed with diabetes 2 years ago after having prediabetes for 8-9 years prior. In the last several months she has made significant diet changes and has increased exercise. She has been able to lose 30lbs. Was on several oral medications but then transitioned to Mounjaro and is now maintained on weekly GLP-1 agonist alone.     Diabetes History:  Diagnosed- 7/2022; prediabetes 8 years ago and then progressed to DM type 2 two years ago.   Patient has not had hospitalizations for blood sugar issues    Prior glycohemoglobin were 6.0% 3/2024  Glucose in clinic today - 78 mg/dl     Dietary compliance: Good- Follows low CHO diet, eats three meals daily. Avoids bread, tortillas, chips and other high CHO foods.     Exercise: Yes- riding stationary bike 15-30 minutes daily 2 twice daily   Polyuria/polydipsia: No  Blurred vision: No    Episodes of hypoglycemia: No  Blood Glucose:  Checking glucose 3 times daily  - reviewed logs:    Fasting- 80-90's  Lunch-   Rpmocg-75-80's     Previous DM therapies:  - Actos - stopped when started on GLP-1 agonist   - Metformin - GI side effects - abdominal pain     Current DM Regimen:  Mounjaro 12.5 mg subcutaneous weekly - tolerating medication     Modifying factors:  Medication adherence: Yes   Recent steroids, illness or infections: No       REVIEW OF SYSTEMS  Eyes: Diabetic retinopathy present: No            Most recent visit to eye doctor in last 12 months: Yes- within last 12 months     CV: Cardiovascular disease present: No         Hypertension present: No, previously on medication but stopped with weight loss in the last year.          Hyperlipidemia  present: No         Peripheral Vascular Disease present: No    : Nephropathy present: No    Neuro: Neuropathy present: No- denies     Skin: Infection or ulceration: No    Osteoporosis: No    Thyroid disease: Yes; diagnosed with hypothyroidism 20 years ago. Now maintained on Levothyroxine 137 mcg PO daily. Normal TFT's 3/2027. Does note difficulty with weight loss, although has been able to lose weight with addition of Mounjaro. + fatigue and constipation as well.       Medications:     Current Outpatient Medications:     MONTELUKAST 10 MG Oral Tab, Take 1 tablet by mouth nightly, Disp: 30 tablet, Rfl: 0    Tirzepatide (MOUNJARO) 12.5 MG/0.5ML Subcutaneous Solution Pen-injector, Inject 12.5 mg into the skin once a week., Disp: 2 mL, Rfl: 1    ergocalciferol 1.25 MG (22987 UT) Oral Cap, Take 1 capsule (50,000 Units total) by mouth once a week., Disp: 12 capsule, Rfl: 0    levothyroxine 137 MCG Oral Tab, Take 137 mcg by mouth before breakfast., Disp: 90 tablet, Rfl: 3    Loratadine-Pseudoephedrine (EQL ALLERGY/CONGESTION RELIEF OR), Take 1 tablet by mouth Q12H., Disp: , Rfl:     pantoprazole 40 MG Oral Tab EC, Take 1 tablet (40 mg total) by mouth daily., Disp: 30 tablet, Rfl: 3    fluticasone propionate 50 MCG/ACT Nasal Suspension, 1 spray by Nasal route 2 (two) times daily., Disp: 16 g, Rfl: 3    Diclofenac-miSOPROStol 50-0.2 MG Oral Tab EC, Take 1 tablet by mouth 2 (two) times daily with meals., Disp: 180 tablet, Rfl: 2    escitalopram 10 MG Oral Tab, Take 1 tablet (10 mg total) by mouth every morning., Disp: 90 tablet, Rfl: 3    Xththjvyi-Sodyncgrg-Dlvhwdewsr (METAFOLBIC PLUS) 6-2-600 MG Oral Tab, Take 1 tablet by mouth daily., Disp: 180 tablet, Rfl: 2    amLODIPine 5 MG Oral Tab, Take 1 tablet (5 mg total) by mouth daily., Disp: 90 tablet, Rfl: 3    Mometasone Furoate 0.1 % External Cream, Apply to affected area(s) BID, Disp: 30 g, Rfl: 1    Glucose Blood (RELION TRUE METRIX TEST STRIPS) In Vitro Strip, Use BID,  Disp: 100 each, Rfl: 5    simethicone 125 MG Oral Chew Tab, Chew 2 tablets (250 mg total) by mouth every 6 (six) hours as needed for FLATULENCE., Disp: 60 tablet, Rfl: 1    Tirzepatide (MOUNJARO) 5 MG/0.5ML Subcutaneous Solution Pen-injector, Inject 10 mg into the skin once a week for 28 days., Disp: 12 mL, Rfl: 3    Norethin-Eth Estrad Triphasic (ORTHO-NOVUM 7/7/7, 28,) 0.5/0.75/1-35 MG-MCG Oral Tab, Take 1 tablet by mouth daily., Disp: 1 each, Rfl: 1    Continuous Blood Gluc Transmit (DEXCOM G6 TRANSMITTER) Does not apply Misc, 1 Units continuous., Disp: 1 each, Rfl: 2    Continuous Blood Gluc Sensor (DEXCOM G6 SENSOR) Does not apply Misc, 1 each Every 10 days. Use as directed every 10 days, Disp: 3 each, Rfl: 11     Allergies:   Allergies   Allergen Reactions    Contrave [Naltrexone-Bupropion Hcl Er] DIZZINESS and OTHER (SEE COMMENTS)     Headaches     Victoza OTHER (SEE COMMENTS)     Abdominal pain       Social History:   Social History     Socioeconomic History    Marital status:    Tobacco Use    Smoking status: Never     Passive exposure: Never    Smokeless tobacco: Never   Vaping Use    Vaping status: Never Used   Substance and Sexual Activity    Alcohol use: No     Alcohol/week: 0.0 standard drinks of alcohol    Drug use: No     Comment: No history of illicit substance abuse       Medical History:   Past Medical History:    Hypothyroidism    Polycystic ovarian disease    Rheumatic fever       Surgical history:   Past Surgical History:   Procedure Laterality Date    Appendectomy      Colonoscopy N/A 10/28/2023    Procedure: COLONOSCOPY;  Surgeon: Ollie Whalen MD;  Location: [facility] ENDOSCOPY         PHYSICAL EXAM  Vitals:    04/17/24 0850   BP: 110/70   Pulse: 80   Weight: 231 lb (104.8 kg)   Height: 5' 4\" (1.626 m)       General Appearance:  alert, well developed, in no acute distress  Eyes:  normal conjunctivae, sclera, and normal pupils  Neck: Trachea midline: Normal  Back: no kyphosis or  back tenderness  Lymph Nodes:  No abnormal nodes noted  Musculoskeletal:  normal muscle strength and tone  Skin:  normal moisture and skin texture  Hair & Nails:  normal scalp hair     Hematologic:  no excessive bruising  Neuro:  sensory grossly intact and motor grossly intact.   Psychiatric:  oriented to time, self, and place  Nutritional:  no abnormal weight gain or loss      ASSESSMENT/PLAN:    Diabetes mellitus type 2 controlled  - HgA1c- 6.0% 3/2024  -Congratulated patient on improved diet and exercise and weight loss in the last year.   -Reviewed ABC's of diabetes   - Reviewed pathogenesis of diabetes.   - Reviewed importance of good glycemic control to prevent microvascular and macrovascular complications including nephropathy, neuropathy, retinopathy, and cardiovascular disease.  - Reviewed importance of SBGM- check glucose 1-3 times daily   - Reviewed target glucose goals for patient  fasting and <180 post prandially   - Reviewed importance of following diabetic diet- recommended 135 grams of CHO per day or 45 grams per meal.   - Provided patient education materials    - Increase Mounjaro 15mg subcutaneous weekly. She has 3 weeks left of 12.5mg and will finish this first. Reviewed side effects and risks vs benefits of medication.     - Interested in CGM- will send prescription for Anthony 3 sensors to pharmacy. Discussed insurance coverage and cash pay options.    - normotensive   - no nephropathy- last lab 3/2024  - Lipids 1/2023- above target - - repeat labs now.   - UTD with optho- reviewed importance of yearly optho follow up   - normal foot exam 4/2024    RTC in 3 months, can establish with Endocrine MD to follow for hypothyroidism and PCOS.   4/17/2024  YOANDY Lind    A total of  35 minutes was spent on obtaining history, reviewing pertinent imaging/labs and specialists notes, evaluating patient, providing multiple treatment options, reinforcing diet/exercise and compliance,  and completing documentation.

## 2024-04-18 ENCOUNTER — HOSPITAL ENCOUNTER (OUTPATIENT)
Dept: ULTRASOUND IMAGING | Facility: HOSPITAL | Age: 49
Discharge: HOME | End: 2024-04-18
Attending: OBSTETRICS & GYNECOLOGY
Payer: COMMERCIAL

## 2024-04-18 DIAGNOSIS — N92.1 MENORRHAGIA WITH IRREGULAR CYCLE: ICD-10-CM

## 2024-04-18 PROCEDURE — 76856 US EXAM PELVIC COMPLETE: CPT | Performed by: OBSTETRICS & GYNECOLOGY

## 2024-04-18 PROCEDURE — 76830 TRANSVAGINAL US NON-OB: CPT | Performed by: OBSTETRICS & GYNECOLOGY

## 2024-04-19 RX ORDER — NORETHINDRONE AND ETHINYL ESTRADIOL 7 DAYS X 3
1 KIT ORAL DAILY
Qty: 28 TABLET | Refills: 0 | Status: SHIPPED | OUTPATIENT
Start: 2024-04-19

## 2024-04-19 NOTE — TELEPHONE ENCOUNTER
Please review; protocol failed. Or has no protocol    Requested Prescriptions   Pending Prescriptions Disp Refills    DASETTA 7/7/7 0.5/0.75/1-35 MG-MCG Oral Tab [Pharmacy Med Name: Dasetta 7/7/7 0.5/0.75/1-35 MG-MCG Oral Tablet] 28 tablet 0     Sig: Take 1 tablet by mouth once daily       Gynecology Medication Protocol Passed - 4/18/2024  9:22 AM        Passed - PASS--PENDING LAST PAP WNL--VIA MANUAL LOOKUP        Passed - Mammogram in past 12 months        Passed - Physical or Pelvic/Breast in past 12 or next 3 mos--VIA MANUAL LOOKUP              Recent Outpatient Visits              2 days ago Controlled type 2 diabetes mellitus without complication, without long-term current use of insulin ([facility])    [facility] Arleen Paris APRN    Office Visit    3 weeks ago Menorrhagia with irregular cycle    [facility] - OB/GYN Emigdio Waters MD    Office Visit    1 month ago Type 2 diabetes mellitus without complication, without long-term current use of insulin ([facility]    [facility]    2 months ago Acute gastritis without hemorrhage, unspecified gastritis type    [facility]    [facility] Visit    4 months ago Type 2 diabetes mellitus without complication, without long-term current use of insulin ([facility]    [facility]           Future Appointments         Provider Department Appt Notes    In 4 days Emigdio Waters MD [facility] - OB/GYN ENDO BX    In 2 months Carter Weir MD [facility] Follow up    In 2 months Arleen Paris APRN [facility] 3 mos

## 2024-04-22 RX ORDER — MONTELUKAST SODIUM 10 MG/1
10 TABLET ORAL NIGHTLY
Qty: 30 TABLET | Refills: 0 | Status: SHIPPED | OUTPATIENT
Start: 2024-04-22

## 2024-04-23 ENCOUNTER — OFFICE VISIT (OUTPATIENT)
Dept: OBGYN CLINIC | Facility: CLINIC | Age: 49
End: 2024-04-23
Payer: COMMERCIAL

## 2024-04-23 VITALS — SYSTOLIC BLOOD PRESSURE: 109 MMHG | DIASTOLIC BLOOD PRESSURE: 69 MMHG | BODY MASS INDEX: 40 KG/M2 | WEIGHT: 233.19 LBS

## 2024-04-23 DIAGNOSIS — N92.0 EXCESSIVE OR FREQUENT MENSTRUATION: ICD-10-CM

## 2024-04-23 DIAGNOSIS — N92.1 MENORRHAGIA WITH IRREGULAR CYCLE: Primary | ICD-10-CM

## 2024-04-23 DIAGNOSIS — Z01.812 PRE-PROCEDURAL LABORATORY EXAMINATION: ICD-10-CM

## 2024-04-23 LAB
CONTROL LINE PRESENT WITH A CLEAR BACKGROUND (YES/NO): YES YES/NO
KIT LOT #: NORMAL NUMERIC
PREGNANCY TEST, URINE: NEGATIVE

## 2024-04-23 PROCEDURE — 81025 URINE PREGNANCY TEST: CPT | Performed by: OBSTETRICS & GYNECOLOGY

## 2024-04-23 PROCEDURE — 58100 BIOPSY OF UTERUS LINING: CPT | Performed by: OBSTETRICS & GYNECOLOGY

## 2024-04-23 PROCEDURE — 3074F SYST BP LT 130 MM HG: CPT | Performed by: OBSTETRICS & GYNECOLOGY

## 2024-04-23 PROCEDURE — 99213 OFFICE O/P EST LOW 20 MIN: CPT | Performed by: OBSTETRICS & GYNECOLOGY

## 2024-04-23 PROCEDURE — 3078F DIAST BP <80 MM HG: CPT | Performed by: OBSTETRICS & GYNECOLOGY

## 2024-04-23 NOTE — PROGRESS NOTES
Subjective:     Patient ID: Eva Beasley is a 49 year old female.    HPI  Gyne follow up  For endo bx  Pelvic ultrasound:  PROCEDURE: US PELVIS TRANSABDOMINAL AND TRANSVAGINAL (CPT=76856/12796)     COMPARISON: [facility], US PELVIS W EV (CPT=76856/15784), 11/29/2021, 1:50 PM.     INDICATIONS: Menorrhagia with irregular cycle     TECHNIQUE: Pelvic ultrasound using transabdominal and transvaginal technique.  A transvaginal scan was performed for endometrial and adnexal evaluation     FINDINGS:  UTERUS:   Measures 10.0 x 4.5 x 7.0 cm.     ENDOMETRIUM: Endometrium measures 10 mm.  No fluid or mass in the endometrial canal.    MYOMETRIUM: Heterogeneous anterior uterine myometrium redemonstrated without a discrete fibroid.  Left-sided posterior uterine body leiomyoma measuring 11 x 11 x 9 mm small right body/fundal uterine leiomyoma measuring 12 x 12 x 10 mm.     OVARIES AND ADNEXA:     RIGHT:   Measures 2.6 x 2.4 x 2.3 cm.  Normal appearance with no masses.    LEFT:   Asymmetric Enlarged left ovary measures 5.8 x 4.2 x 3.8 cm.  Dominant unilocular cyst measuring 4.1 x 3.8 x 4.0 cm.     CUL-DE-SAC:   Normal.  No free fluid or mass.    OTHER: Negative.  Bladder appears normal.                 Impression   CONCLUSION:  1. Heterogeneous uterine myometrium without a discrete anterior fibroid.  Two small posterior uterine body/fundal leiomyomas described above.  2. Normal thickness endometrium measuring 10 mm.  3. Dominant benign anechoic left ovarian cyst measuring 4.1 x 4.0 cm contributing to left ovarian enlargement has developed .  Normal right ovary.  No adnexal mass.             Dictated by (CST): Reg Root MD on 4/19/2024 at 1:39 PM       Reviewed labs.  FSH normal 13, not menopausal.  Blood count normal with hemoglobin 13.2.  Thyroid screen normal.  History/Other:   Review of Systems  Current Outpatient Medications   Medication Sig Dispense Refill    MONTELUKAST 10 MG  Oral Tab Take 1 tablet by mouth nightly 30 tablet 0    Norethin-Eth Estrad Triphasic (DASETTA 7/7/7) 0.5/0.75/1-35 MG-MCG Oral Tab Take 1 tablet by mouth daily. 28 tablet 0    Continuous Blood Gluc Sensor (FREESTYLE DEVEN 3 SENSOR) Does not apply Misc 1 each every 14 (fourteen) days. 2 each 5    Tirzepatide (MOUNJARO) 15 MG/0.5ML Subcutaneous Solution Pen-injector Inject 15 mg into the skin once a week. 2 mL 4    ergocalciferol 1.25 MG (14297 UT) Oral Cap Take 1 capsule (50,000 Units total) by mouth once a week. 12 capsule 0    Continuous Blood Gluc Transmit (DEXCOM G6 TRANSMITTER) Does not apply Misc 1 Units continuous. 1 each 2    Continuous Blood Gluc Sensor (DEXCOM G6 SENSOR) Does not apply Misc 1 each Every 10 days. Use as directed every 10 days 3 each 11    levothyroxine 137 MCG Oral Tab Take 137 mcg by mouth before breakfast. 90 tablet 3    Loratadine-Pseudoephedrine (EQL ALLERGY/CONGESTION RELIEF OR) Take 1 tablet by mouth Q12H.      pantoprazole 40 MG Oral Tab EC Take 1 tablet (40 mg total) by mouth daily. 30 tablet 3    fluticasone propionate 50 MCG/ACT Nasal Suspension 1 spray by Nasal route 2 (two) times daily. 16 g 3    Diclofenac-miSOPROStol 50-0.2 MG Oral Tab EC Take 1 tablet by mouth 2 (two) times daily with meals. 180 tablet 2    escitalopram 10 MG Oral Tab Take 1 tablet (10 mg total) by mouth every morning. 90 tablet 3    Ygtemaqxp-Jzqjvijum-Ajrkdllbtz (METAFOLBIC PLUS) 6-2-600 MG Oral Tab Take 1 tablet by mouth daily. 180 tablet 2    amLODIPine 5 MG Oral Tab Take 1 tablet (5 mg total) by mouth daily. 90 tablet 3    Mometasone Furoate 0.1 % External Cream Apply to affected area(s) BID 30 g 1    Glucose Blood (RELION TRUE METRIX TEST STRIPS) In Vitro Strip Use  each 5    simethicone 125 MG Oral Chew Tab Chew 2 tablets (250 mg total) by mouth every 6 (six) hours as needed for FLATULENCE. 60 tablet 1     Allergies:  Allergies   Allergen Reactions    Contrave [Naltrexone-Bupropion Hcl Er]  DIZZINESS and OTHER (SEE COMMENTS)     Headaches     Victoza OTHER (SEE COMMENTS)     Abdominal pain       Past Medical History:    Hypothyroidism    Polycystic ovarian disease    Rheumatic fever      Past Surgical History:   Procedure Laterality Date    Appendectomy      Colonoscopy N/A 10/28/2023    Procedure: COLONOSCOPY;  Surgeon: Ollie Whalen MD;  Location: [facility] ENDOSCOPY      Family History   Problem Relation Age of Onset    Circulatory Problems Mother         Peripheral vascular disease    Cancer Paternal Grandfather         Liver cancer     Alcohol and Other Disorders Associated Paternal Grandfather         Alcoholism    Diabetes Paternal Grandmother         Type 2      Social History:   Social History     Socioeconomic History    Marital status:    Tobacco Use    Smoking status: Never     Passive exposure: Never    Smokeless tobacco: Never   Vaping Use    Vaping status: Never Used   Substance and Sexual Activity    Alcohol use: No     Alcohol/week: 0.0 standard drinks of alcohol    Drug use: No     Comment: No history of illicit substance abuse        Objective:   Physical Exam    Assessment & Plan:   1. Menorrhagia with irregular cycle    Counseled extensively on findings, perimenopause, ovarian cysts.  Continue on OCPs  Await endometrial biopsy  Follow-up in 3 months for pelvic ultrasound for resolution of ovarian cyst and assessment of menstrual cycle    Meds This Visit:  Requested Prescriptions      No prescriptions requested or ordered in this encounter       Imaging & Referrals:  None

## 2024-04-23 NOTE — PROCEDURES
Consent obtained and time out done.  Pregnancy test negative (for premenopausal)    In lithotomy position a Graves (or Ann) speculum was placed and the cervix visualized.  The cervix and vagina were prepped with Betadine.  Anterior lip of cervix grasped with single tooth tenaculum.  Cervix dilated.    Endometrial biopsy was performed with Pipelle with circumferential technique.  Uterine cavity depth:8 cm  Specimen sent to the lab in formalin.  Tenaculum removed and no bleeding.    The patient tolerated the procedure well.  Management recommendations to follow path results.

## 2024-04-26 ENCOUNTER — TELEPHONE (OUTPATIENT)
Dept: OBGYN CLINIC | Facility: CLINIC | Age: 49
End: 2024-04-26

## 2024-04-26 NOTE — TELEPHONE ENCOUNTER
----- Message from Emigdio Waters MD sent at 4/25/2024  3:55 PM CDT -----  Please inform by MyChart, call, or mail of normal endometrial biopsy.  There were no cancerous or precancerous (hyperplasia) cells.  Please follow any other recommendation that Dr. Waters may have given.

## 2024-04-26 NOTE — TELEPHONE ENCOUNTER
Pt called back informed of normal results and follow up providers recommendations pt understood will follow up with dr ramirez in three months no further questions or concenrs

## 2024-05-13 RX ORDER — NORETHINDRONE AND ETHINYL ESTRADIOL 7 DAYS X 3
1 KIT ORAL DAILY
Qty: 84 TABLET | Refills: 3 | Status: SHIPPED | OUTPATIENT
Start: 2024-05-13

## 2024-05-13 NOTE — TELEPHONE ENCOUNTER
Refill passed per [facility] protocol.   3/26/2024 normal pap/HPV.   Requested Prescriptions   Pending Prescriptions Disp Refills    DASETTA 7/7/7 0.5/0.75/1-35 MG-MCG Oral Tab [Pharmacy Med Name: Dasetta 7/7/7 0.5/0.75/1-35 MG-MCG Oral Tablet] 28 tablet 0     Sig: Take 1 tablet by mouth once daily       Gynecology Medication Protocol Passed - 5/12/2024 11:26 AM        Passed - PASS--PENDING LAST PAP WNL--VIA MANUAL LOOKUP        Passed - Mammogram in past 12 months        Passed - Physical or Pelvic/Breast in past 12 or next 3 mos--VIA MANUAL LOOKUP             Recent Outpatient Visits              2 weeks ago Menorrhagia with irregular cycle    [facility] Emigdio Waters MD    Office Visit    3 weeks ago Controlled type 2 diabetes mellitus without complication, without long-term current use of insulin ([facility])    [facility] Arleen Paris APRN    Office Visit    1 month ago Menorrhagia with irregular cycle    [facility] Emigdio Waters MD    Office Visit    2 months ago Type 2 diabetes mellitus without complication, without long-term current use of insulin ([facility])    [facility] Carter Weir MD    Office Visit    3 months ago Acute gastritis without hemorrhage, unspecified gastritis type    [facility] Carter Weir MD    Office Visit            Future Appointments         Provider Department Appt Notes    In 2 months Carter Weir MD [facility] Follow up    In 2 months [facility]leen Paris APRN [facility] 3 [facility]    In 2 months Emigdio Waters MD [facility] - [facility]/GYN gyne /s

## 2024-05-22 NOTE — TELEPHONE ENCOUNTER
Please review. Protocol Failed; No Protocol      Component  Ref Range & Units 1/14/23  1:33 PM   Vitamin D, 25OH, Total  30.0 - 100.0 ng/mL 49.1       Requested Prescriptions   Pending Prescriptions Disp Refills    ERGOCALCIFEROL 1.25 MG (12425 UT) Oral Cap [Pharmacy Med Name: Vitamin D (Ergocalciferol) 1.25 MG (81072 UT) Oral Capsule] 12 capsule 0     Sig: Take 1 capsule by mouth once a week       There is no refill protocol information for this order            Future Appointments         Provider Department Appt Notes    In 1 month Carter Weir MD [facility] Follow up    In 1 month Arleen Paris APRN [facility] 3 mos    In 2 months Emigdio Waters MD [facility] - OB/GYN gyne u/s          Recent Outpatient Visits              4 weeks ago Menorrhagia with irregular cycle    [facility] Emigdio Waters MD    Office Visit    1 month ago Controlled type 2 diabetes mellitus without complication, without long-term current use of insulin ([facility])    [facility] Arleen Paris APRN    Office Visit    1 month ago Menorrhagia with irregular cycle    [facility] OB[facility] Emigdio Waters MD    Office Visit    2 months ago Type 2 diabetes mellitus without complication, without long-term current use of insulin ([facility])    [facility] Carter White MD    Office Visit    3 months ago Acute gastritis without hemorrhage, unspecified gastritis type    [facility]    [facility] Visit

## 2024-05-23 RX ORDER — ERGOCALCIFEROL 1.25 MG/1
50000 CAPSULE ORAL WEEKLY
Qty: 12 CAPSULE | Refills: 0 | OUTPATIENT
Start: 2024-05-23

## 2024-05-28 NOTE — TELEPHONE ENCOUNTER
Refill passed per [facility] protocol. Please review drug-drug interaction warning:    High  Drug-Drug: Diclofenac-miSOPROStol and escitalopramToxic effects may be increased with concurrent administration of Diclofenac (Oral) and Selective Serotonin Reuptake Inhibitors. The risk of upper gastrointestinal bleeding may be increased. Patients taking both drugs concurrently should be educated about the signs and symptoms of GI bleeding.    Requested Prescriptions   Pending Prescriptions Disp Refills    DICLOFENAC-MISOPROSTOL 50-0.2 MG Oral Tab EC [Pharmacy Med Name: Diclofenac-miSOPROStol 50-0.2 MG Oral Tablet Delayed Release] 180 tablet 0     Sig: TAKE 1 TABLET BY MOUTH TWICE DAILY WITH MEALS       Non-Narcotic Pain Medication Protocol Passed - 5/25/2024  6:04 AM        Passed - In person appointment or virtual visit in the past 6 mos or appointment in next 3 mos     Recent Outpatient Visits              1 month ago Menorrhagia with irregular cycle    [facility] - OB/GYN Emigdio Waters MD    Office Visit    1 month ago Controlled type 2 diabetes mellitus without complication, without long-term current use of insulin ([facility])    [facility] Arleen Paris APRN    Office Visit    2 months ago Menorrhagia with irregular cycle    [facility] - OB/GYN Emigdio Waters MD    Office Visit    2 months ago Type 2 diabetes mellitus without complication, without long-term current use of insulin ([facility]    [facility]    4 months ago Acute gastritis without hemorrhage, unspecified gastritis type    [facility]    [facility]          Future Appointments         Provider Department Appt Notes    In 1 month Carter Weir MD [facility] Follow up    In 1 month Arleen Paris APRN [facility] 3 mos    In 1 month Emigdio Waters MD [facility] - OB/GYN gyne u/s

## 2024-05-29 RX ORDER — DICLOFENAC SODIUM AND MISOPROSTOL 50; 200 MG/1; UG/1
1 TABLET, DELAYED RELEASE ORAL 2 TIMES DAILY WITH MEALS
Qty: 180 TABLET | Refills: 1 | Status: SHIPPED | OUTPATIENT
Start: 2024-05-29

## 2024-06-16 RX ORDER — TIRZEPATIDE 12.5 MG/.5ML
INJECTION, SOLUTION SUBCUTANEOUS
Qty: 4 ML | Refills: 0 | OUTPATIENT
Start: 2024-06-16

## 2024-07-01 ENCOUNTER — TELEPHONE (OUTPATIENT)
Dept: FAMILY MEDICINE CLINIC | Facility: CLINIC | Age: 49
End: 2024-07-01

## 2024-07-01 NOTE — TELEPHONE ENCOUNTER
Pt walked in to clinic stated that pharmacy told her she need a PA for her mounjaro 15. Stated that she was only given a 1 month supply and she requires a 3 month as well. Please advise if PA required.

## 2024-07-02 NOTE — TELEPHONE ENCOUNTER
Approved    Prior authorization approved  Payer: [employer] Commerical Case ID: [id number]    [phone]    [phone]  Note from payer: Request Reference Number: [id number].  MOUNJARO     INJ 15MG/0.5 is approved through 07/01/2025.  Your patient may now fill this prescription and it will be covered.  Approval Details    Authorization number: [id number]  Authorized from July 1, 2024 to July 1, 2025  Electronic appeal: Not supported  View History

## 2024-07-13 ENCOUNTER — OFFICE VISIT (OUTPATIENT)
Dept: FAMILY MEDICINE CLINIC | Facility: CLINIC | Age: 49
End: 2024-07-13

## 2024-07-13 VITALS
WEIGHT: 218 LBS | DIASTOLIC BLOOD PRESSURE: 67 MMHG | HEIGHT: 64 IN | SYSTOLIC BLOOD PRESSURE: 114 MMHG | HEART RATE: 66 BPM | BODY MASS INDEX: 37.22 KG/M2

## 2024-07-13 DIAGNOSIS — G62.89 OTHER POLYNEUROPATHY: ICD-10-CM

## 2024-07-13 DIAGNOSIS — E66.01 CLASS 3 SEVERE OBESITY DUE TO EXCESS CALORIES WITH SERIOUS COMORBIDITY AND BODY MASS INDEX (BMI) OF 45.0 TO 49.9 IN ADULT (HCC): ICD-10-CM

## 2024-07-13 DIAGNOSIS — E11.9 TYPE 2 DIABETES MELLITUS WITHOUT COMPLICATION, WITHOUT LONG-TERM CURRENT USE OF INSULIN (HCC): Primary | ICD-10-CM

## 2024-07-13 PROCEDURE — 99214 OFFICE O/P EST MOD 30 MIN: CPT | Performed by: FAMILY MEDICINE

## 2024-07-13 RX ORDER — TIRZEPATIDE 15 MG/.5ML
15 INJECTION, SOLUTION SUBCUTANEOUS WEEKLY
Qty: 2 ML | Refills: 4 | Status: SHIPPED | OUTPATIENT
Start: 2024-07-13

## 2024-07-13 RX ORDER — GABAPENTIN 100 MG/1
100 CAPSULE ORAL NIGHTLY
Qty: 90 CAPSULE | Refills: 2 | Status: SHIPPED | OUTPATIENT
Start: 2024-07-13

## 2024-07-13 NOTE — PROGRESS NOTES
7/13/2024  9:58 AM    Eva Beasley is a 49 year old female.    Chief complaint(s):   Chief Complaint   Patient presents with    Diabetes     Follow up    Other     Burning sensation on Right foot x over a month     HPI:     Eva Beasley primary complaint is regarding Diabetes.     Patient Eva Beasley is a 49 year old female is here to be evaluated for type 2 diabetes.  Specifically, female has type 2, insulin none requiring diabetes. Compliance with treatment has been fair.  Patient's diabetes was first diagnosed July 2022.  Patient follows a 1800 calorie ADA diet.  Patient report experiencing the following diabetes related symptoms; Negative for polyuria, Negative for polydipsia, Negative for blurred vision.  Depression symptoms include none.  Tobacco screen: none  smoker.  Current meds include :  oral hypoglycemic include: Actose  30 mg  insulin/injectable : Mounjaro 10 mg Q wk .  Hypoglycemia severity is not applicable.she reports home blood glucose readings have been 160 (#s) and believes  having fair glucose control.  Most recent lab results include glycohemoglobin  A1c 6.0 %, microalbuminuria have been .  In regard to preventative care, her last ophthalmology exam was in < 12 months ago.  Opthalmic evaluation have shown none pathology.  Concurrent relative health problems include POD.      HISTORY:  Past Medical History:    Hypothyroidism    Polycystic ovarian disease    Rheumatic fever      Past Surgical History:   Procedure Laterality Date    Appendectomy      Colonoscopy N/A 10/28/2023    Procedure: COLONOSCOPY;  Surgeon: Ollie Whalen MD;  Location: [facility] ENDOSCOPY      Family History   Problem Relation Age of Onset    Circulatory Problems Mother         Peripheral vascular disease    Cancer Paternal Grandfather         Liver cancer     Alcohol and Other Disorders Associated Paternal Grandfather         Alcoholism     Diabetes Paternal Grandmother         Type 2      Social History:   Social History     Socioeconomic History    Marital status:    Tobacco Use    Smoking status: Never     Passive exposure: Never    Smokeless tobacco: Never   Vaping Use    Vaping status: Never Used   Substance and Sexual Activity    Alcohol use: No     Alcohol/week: 0.0 standard drinks of alcohol    Drug use: No     Comment: No history of illicit substance abuse        Immunizations:   Immunization History   Administered Date(s) Administered    FLULAVAL 6 months & older 0.5 ml Prefilled syringe (03463) 12/04/2017, 11/02/2018, 10/22/2019, 10/23/2021, 01/14/2023    FLUZONE 6 months and older PFS 0.5 ml (48994) 10/01/2016, 12/04/2017, 11/02/2018, 12/16/2023    Flulaval, 3 Years & >, IM 10/16/2007, 09/28/2010    Fluvirin, 3 Years & >, Im 12/13/2005, 11/02/2006, 11/06/2012    Influenza 11/11/2008, 10/10/2009, 09/23/2014, 09/16/2015    Influenza Virus Vaccine, H1N1 12/19/2009    TD 02/29/2004    TDAP 07/25/2013, 01/14/2023       Medications (Active prior to today's visit):  Current Outpatient Medications   Medication Sig Dispense Refill    Tirzepatide (MOUNJARO) 15 MG/0.5ML Subcutaneous Solution Pen-injector Inject 15 mg into the skin once a week. 2 mL 4    gabapentin 100 MG Oral Cap Take 1 capsule (100 mg total) by mouth nightly. 90 capsule 2    DICLOFENAC-MISOPROSTOL 50-0.2 MG Oral Tab EC TAKE 1 TABLET BY MOUTH TWICE DAILY WITH MEALS 180 tablet 1    Norethin-Eth Estrad Triphasic (DASETTA 7/7/7) 0.5/0.75/1-35 MG-MCG Oral Tab Take 1 tablet by mouth daily. 84 tablet 3    MONTELUKAST 10 MG Oral Tab Take 1 tablet by mouth nightly 30 tablet 0    Continuous Blood Gluc Sensor (FREESTYLE DEVEN 3 SENSOR) Does not apply Misc 1 each every 14 (fourteen) days. 2 each 5    ergocalciferol 1.25 MG (08138 UT) Oral Cap Take 1 capsule (50,000 Units total) by mouth once a week. 12 capsule 0    Continuous Blood Gluc Transmit (DEXCOM G6 TRANSMITTER) Does not apply Misc  1 Units continuous. 1 each 2    Continuous Blood Gluc Sensor (DEXCOM G6 SENSOR) Does not apply Misc 1 each Every 10 days. Use as directed every 10 days 3 each 11    levothyroxine 137 MCG Oral Tab Take 137 mcg by mouth before breakfast. 90 tablet 3    Loratadine-Pseudoephedrine (EQL ALLERGY/CONGESTION RELIEF OR) Take 1 tablet by mouth Q12H.      pantoprazole 40 MG Oral Tab EC Take 1 tablet (40 mg total) by mouth daily. 30 tablet 3    fluticasone propionate 50 MCG/ACT Nasal Suspension 1 spray by Nasal route 2 (two) times daily. 16 g 3    escitalopram 10 MG Oral Tab Take 1 tablet (10 mg total) by mouth every morning. 90 tablet 3    Wsxgfwvtw-Deremjurv-Ytxlxfprmg (METAFOLBIC PLUS) 6-2-600 MG Oral Tab Take 1 tablet by mouth daily. 180 tablet 2    Glucose Blood (RELION TRUE METRIX TEST STRIPS) In Vitro Strip Use  each 5    simethicone 125 MG Oral Chew Tab Chew 2 tablets (250 mg total) by mouth every 6 (six) hours as needed for FLATULENCE. 60 tablet 1    Mometasone Furoate 0.1 % External Cream Apply to affected area(s) BID (Patient not taking: Reported on 7/13/2024) 30 g 1       Allergies:  Allergies   Allergen Reactions    Contrave [Naltrexone-Bupropion Hcl Er] DIZZINESS and OTHER (SEE COMMENTS)     Headaches     Victoza OTHER (SEE COMMENTS)     Abdominal pain         ROS:   Review of Systems   Constitutional:  Positive for unexpected weight change (overweight). Negative for appetite change and fever.   Eyes:  Negative for visual disturbance.   Respiratory:  Negative for shortness of breath.    Cardiovascular:  Negative for chest pain.   Gastrointestinal:  Negative for abdominal pain, nausea and vomiting.   Endocrine: Negative for polydipsia and polyuria.   Musculoskeletal:  Negative for back pain.        Foot pain   Skin:  Negative for rash.   Neurological:  Positive for numbness (feet). Negative for dizziness and headaches.       PHYSICAL EXAM:   VS: /67   Pulse 66   Ht 5' 4\" (1.626 m)   Wt 218 lb (98.9  kg)   LMP 06/08/2024 (Approximate)   BMI 37.42 kg/m²     Physical Exam  Vitals reviewed.   Constitutional:       General: She is not in acute distress.     Appearance: Normal appearance. She is obese.   HENT:      Head: Normocephalic.   Eyes:      Conjunctiva/sclera: Conjunctivae normal.   Cardiovascular:      Rate and Rhythm: Normal rate.   Pulmonary:      Effort: Pulmonary effort is normal.   Musculoskeletal:      Cervical back: Neck supple.   Skin:     Findings: No rash.   Psychiatric:         Mood and Affect: Mood normal.         LABORATORY RESULTS:   No results found for: \"URCOLOR\", \"URCLA\", \"URINELEUK\", \"URINENITRITE\", \"URINEBLOOD\"   Results for orders placed or performed in visit on 04/23/24   POC Urine pregnancy test [24931]   Result Value Ref Range    Pregnancy Test, Urine Negative     Control Line Present with a clear background (yes/no) yes Yes/No    Kit Lot # [id number] Numeric    Kit Expiration Date 4/8/2025 Date   Specimen to Pathology, Tissue   Result Value Ref Range    Case Report       Surgical Pathology                                Case: [id number]                                  Authorizing Provider:  Emigdio Waters MD       Collected:           04/23/2024 11:39 AM          Ordering Location:     [facility]    Received:            04/23/2024 11:39 AM                                 [facility] - OB/GYN                                                            Pathologist:           Fernando Cali MD                                                         Specimen:    Endometrium                                                                                Final Diagnosis:         Endometrium; biopsy:  Proliferative phase endometrium with focal tubal metaplasia.  No endometrial polyps, glandular atypia, or malignancy identified.        Embedded Images      Clinical Information        N92.1 Menorrhagia With Irregular Cycle.         Gross Description       The specimen is labeled \"Edstin Beasley, endometrium\" received in formalin. The specimen consists of multiple fragments of tan soft tissue and blood clots measuring in aggregate 2.3 x 1.7 x 0.3 cm. Submitted entirely in cassette A1. (jq)      Fernando Cali M.D./mtt        Interpretation Benign       EKG / Spirometry : -     Radiology: No results found.     ASSESSMENT/PLAN:   Assessment   Encounter Diagnoses   Name Primary?    Type 2 diabetes mellitus without complication, without long-term current use of insulin (HCC) Yes    Other polyneuropathy     Class 3 severe obesity due to excess calories with serious comorbidity and body mass index (BMI) of 45.0 to 49.9 in adult (HCC)        DIABETES A&P    LABORATORY & ORDERS: Blood test(s) ordered today ; No orders of the defined types were placed in this encounter.    Additional orders include:   MEDICATIONS:    Tirzepatide (MOUNJARO) 15 MG/0.5ML Subcutaneous Solution Pen-injector, Inject 15 mg into the skin once a week., Disp: 2 mL, Rfl: 4    gabapentin 100 MG Oral Cap, Take 1 capsule (100 mg total) by mouth nightly., Disp: 90 capsule, Rfl: 2    DICLOFENAC-MISOPROSTOL 50-0.2 MG Oral Tab EC, TAKE 1 TABLET BY MOUTH TWICE DAILY WITH MEALS, Disp: 180 tablet, Rfl: 1    Norethin-Eth Estrad Triphasic (DASETTA 7/7/7) 0.5/0.75/1-35 MG-MCG Oral Tab, Take 1 tablet by mouth daily., Disp: 84 tablet, Rfl: 3    MONTELUKAST 10 MG Oral Tab, Take 1 tablet by mouth nightly, Disp: 30 tablet, Rfl: 0    Continuous Blood Gluc Sensor (FREESTYLE DEVEN 3 SENSOR) Does not apply Misc, 1 each every 14 (fourteen) days., Disp: 2 each, Rfl: 5    ergocalciferol 1.25 MG (78030 UT) Oral Cap, Take 1 capsule (50,000 Units total) by mouth once a week., Disp: 12 capsule, Rfl: 0    Continuous Blood Gluc Transmit (DEXCOM G6 TRANSMITTER) Does not apply Misc, 1 Units continuous., Disp: 1 each, Rfl: 2    Continuous Blood Gluc Sensor  (DEXCOM G6 SENSOR) Does not apply Misc, 1 each Every 10 days. Use as directed every 10 days, Disp: 3 each, Rfl: 11    levothyroxine 137 MCG Oral Tab, Take 137 mcg by mouth before breakfast., Disp: 90 tablet, Rfl: 3    Loratadine-Pseudoephedrine (EQL ALLERGY/CONGESTION RELIEF OR), Take 1 tablet by mouth Q12H., Disp: , Rfl:     pantoprazole 40 MG Oral Tab EC, Take 1 tablet (40 mg total) by mouth daily., Disp: 30 tablet, Rfl: 3    fluticasone propionate 50 MCG/ACT Nasal Suspension, 1 spray by Nasal route 2 (two) times daily., Disp: 16 g, Rfl: 3    escitalopram 10 MG Oral Tab, Take 1 tablet (10 mg total) by mouth every morning., Disp: 90 tablet, Rfl: 3    Ogyjfzfxj-Pskuwichl-Uoufhyoryb (METAFOLBIC PLUS) 6-2-600 MG Oral Tab, Take 1 tablet by mouth daily., Disp: 180 tablet, Rfl: 2    Glucose Blood (RELION TRUE METRIX TEST STRIPS) In Vitro Strip, Use BID, Disp: 100 each, Rfl: 5    simethicone 125 MG Oral Chew Tab, Chew 2 tablets (250 mg total) by mouth every 6 (six) hours as needed for FLATULENCE., Disp: 60 tablet, Rfl: 1    Mometasone Furoate 0.1 % External Cream, Apply to affected area(s) BID (Patient not taking: Reported on 7/13/2024), Disp: 30 g, Rfl: 1.  Requested Prescriptions     Signed Prescriptions Disp Refills    Tirzepatide (MOUNJARO) 15 MG/0.5ML Subcutaneous Solution Pen-injector 2 mL 4     Sig: Inject 15 mg into the skin once a week.    gabapentin 100 MG Oral Cap 90 capsule 2     Sig: Take 1 capsule (100 mg total) by mouth nightly.      RECOMMENDATIONS: instructed in use of glucometer ( check fasting glucose rarely), return for training in administering insulin injections, adherence to an 1800 calorie ADA diet,  10 pound weight loss, a graduated exercise program, HgbA1C level checked quarterly, daily foot self-inspection, need for yearly flu shots, and avoid all sodas, juices, candy, chocolates, cakes, ice cream, etc.      FOLLOW-UP: Schedule a follow-up visit in 3 months.            Orders This Visit:  No  orders of the defined types were placed in this encounter.      Meds This Visit:  Requested Prescriptions     Signed Prescriptions Disp Refills    Tirzepatide (MOUNJARO) 15 MG/0.5ML Subcutaneous Solution Pen-injector 2 mL 4     Sig: Inject 15 mg into the skin once a week.    gabapentin 100 MG Oral Cap 90 capsule 2     Sig: Take 1 capsule (100 mg total) by mouth nightly.       Imaging & Referrals:  None         BRANDEN HUNTER MD

## 2024-07-23 ENCOUNTER — OFFICE VISIT (OUTPATIENT)
Dept: OBGYN CLINIC | Facility: CLINIC | Age: 49
End: 2024-07-23

## 2024-07-23 DIAGNOSIS — N83.202 LEFT OVARIAN CYST: Primary | ICD-10-CM

## 2024-07-23 PROCEDURE — 76830 TRANSVAGINAL US NON-OB: CPT | Performed by: OBSTETRICS & GYNECOLOGY

## 2024-07-23 PROCEDURE — 99212 OFFICE O/P EST SF 10 MIN: CPT | Performed by: OBSTETRICS & GYNECOLOGY

## 2024-07-23 NOTE — PROGRESS NOTES
Subjective:   Patient ID: Eva Beasley is a 49 year old female.    HPI  GYN ultrasound follow-up  For left ovarian cyst  On oral contraceptives  Transvaginal ultrasound showed a normal uterus and bilateral ovaries.  Sonolucent small left follicle 2.3 cm.  Significant resolution from 4-1/2 cm size previous.  Will continue OCPs.  Counseled  History/Other:   Review of Systems  Current Outpatient Medications   Medication Sig Dispense Refill    Tirzepatide (MOUNJARO) 15 MG/0.5ML Subcutaneous Solution Pen-injector Inject 15 mg into the skin once a week. 2 mL 4    gabapentin 100 MG Oral Cap Take 1 capsule (100 mg total) by mouth nightly. 90 capsule 2    DICLOFENAC-MISOPROSTOL 50-0.2 MG Oral Tab EC TAKE 1 TABLET BY MOUTH TWICE DAILY WITH MEALS 180 tablet 1    Norethin-Eth Estrad Triphasic (DASETTA 7/7/7) 0.5/0.75/1-35 MG-MCG Oral Tab Take 1 tablet by mouth daily. 84 tablet 3    MONTELUKAST 10 MG Oral Tab Take 1 tablet by mouth nightly 30 tablet 0    Continuous Blood Gluc Sensor (FREESTYLE DEVEN 3 SENSOR) Does not apply Misc 1 each every 14 (fourteen) days. 2 each 5    ergocalciferol 1.25 MG (24356 UT) Oral Cap Take 1 capsule (50,000 Units total) by mouth once a week. 12 capsule 0    Continuous Blood Gluc Transmit (DEXCOM G6 TRANSMITTER) Does not apply Misc 1 Units continuous. 1 each 2    Continuous Blood Gluc Sensor (DEXCOM G6 SENSOR) Does not apply Misc 1 each Every 10 days. Use as directed every 10 days 3 each 11    levothyroxine 137 MCG Oral Tab Take 137 mcg by mouth before breakfast. 90 tablet 3    Loratadine-Pseudoephedrine (EQL ALLERGY/CONGESTION RELIEF OR) Take 1 tablet by mouth Q12H.      pantoprazole 40 MG Oral Tab EC Take 1 tablet (40 mg total) by mouth daily. 30 tablet 3    fluticasone propionate 50 MCG/ACT Nasal Suspension 1 spray by Nasal route 2 (two) times daily. 16 g 3    escitalopram 10 MG Oral Tab Take 1 tablet (10 mg total) by mouth every morning. 90 tablet 3     Iwjtjubvk-Dshwliino-Llkdijqkpu (METAFOLBIC PLUS) 6-2-600 MG Oral Tab Take 1 tablet by mouth daily. 180 tablet 2    Mometasone Furoate 0.1 % External Cream Apply to affected area(s) BID 30 g 1    Glucose Blood (RELION TRUE METRIX TEST STRIPS) In Vitro Strip Use  each 5    simethicone 125 MG Oral Chew Tab Chew 2 tablets (250 mg total) by mouth every 6 (six) hours as needed for FLATULENCE. 60 tablet 1     Allergies:  Allergies   Allergen Reactions    Contrave [Naltrexone-Bupropion Hcl Er] DIZZINESS and OTHER (SEE COMMENTS)     Headaches     Victoza OTHER (SEE COMMENTS)     Abdominal pain       Objective:   Physical Exam    Assessment & Plan:   1. Left ovarian cyst        No orders of the defined types were placed in this encounter.      Meds This Visit:  Requested Prescriptions      No prescriptions requested or ordered in this encounter       Imaging & Referrals:  None

## 2024-08-06 RX ORDER — BLOOD-GLUCOSE SENSOR
1 EACH MISCELLANEOUS AS DIRECTED
Qty: 6 EACH | Refills: 1 | Status: SHIPPED | OUTPATIENT
Start: 2024-08-06

## 2024-08-06 NOTE — TELEPHONE ENCOUNTER
Endocrine Refill protocol for CGM supplies     Protocol Criteria:  PASSED  Appointment with Endocrinology completed in the last 12 months or scheduled in the next 6 months     Verify appointment has been completed or scheduled in the appropriate timeline. If so can send a 90 day supply with 1 refill.     Last completed office visit:4/17/2024 Arleen Paris APRN   Next scheduled Follow up: none. Mychart sent to schedule appt.

## 2024-08-14 ENCOUNTER — TELEPHONE (OUTPATIENT)
Dept: ENDOCRINOLOGY CLINIC | Facility: CLINIC | Age: 49
End: 2024-08-14

## 2024-08-14 NOTE — TELEPHONE ENCOUNTER
Tirzepatide (MOUNJARO) 15 MG/0.5ML Subcutaneous Solution Pen-injector, Inject 15 mg into the skin once a week., Disp: 2 mL, Rfl: 4      KEY: [id number]

## 2024-08-15 NOTE — TELEPHONE ENCOUNTER
Per Epic: approved through 07/01/2025.  Your patient may now fill this prescription and it will be covered.   Called pharmacy and was notified that script wnet through for $35 but will have to be ordered for tomorrow.

## 2024-08-17 NOTE — TELEPHONE ENCOUNTER
Please review; protocol failed/ has no protocol    Requested Prescriptions   Pending Prescriptions Disp Refills    ESCITALOPRAM 10 MG Oral Tab [Pharmacy Med Name: Escitalopram Oxalate 10 MG Oral Tablet] 90 tablet 0     Sig: TAKE 1 TABLET BY MOUTH ONCE DAILY IN THE MORNING       Psychiatric Non-Scheduled (Anti-Anxiety) Failed - 8/13/2024  9:16 AM        Failed - Depression Screening completed within the past 12 months        Passed - In person appointment or virtual visit in the past 6 mos or appointment in next 3 mos     Recent Outpatient Visits              3 weeks ago Left ovarian cyst    [facility] Emigdio Waters MD    Office Visit    1 month ago Type 2 diabetes mellitus without complication, without long-term current use of insulin ([facility])    [facility] Carter White MD    Office Visit    3 months ago Menorrhagia with irregular cycle    [facility] Emigdio Waters [facility]    Office Visit    4 months ago Controlled type 2 diabetes mellitus without complication, without long-term current use of insulin ([facility]    Office Visit    4 months ago Menorrhagia with irregular cycle    [facility]    Office Visit          Future Appointments         Provider Department Appt Notes    In 1 week Carter Weir MD [facility]     In 2 months Carter Weir MD [facility] [place] Return in about 3 months (around 10/13/2024), or physical exam                       Recent Outpatient Visits              3 weeks ago Left ovarian cyst    [facility] Emigdio Waters MD    Office Visit    1  month ago Type 2 diabetes mellitus without complication, without long-term current use of insulin ([facility])    [facility] Carter White MD    Office Visit    3 months ago Menorrhagia with irregular cycle    [facility] Emigdio Waters MD    Office Visit    4 months ago Controlled type 2 diabetes mellitus without complication, without long-term current use of insulin ([facility])    [facility] [place]    Office Visit    4 months ago Menorrhagia with irregular cycle    [facility] Emigdio Waters MD    Office Visit          Future Appointments         Provider Department Appt Notes    In 1 week Carter Weir MD [facility] [address], [place]     [facility] 2 months [facility] about [facility] months (around 10/13/2024), or physical exam

## 2024-08-18 RX ORDER — ESCITALOPRAM OXALATE 10 MG/1
10 TABLET ORAL EVERY MORNING
Qty: 90 TABLET | Refills: 3 | Status: SHIPPED | OUTPATIENT
Start: 2024-08-18

## 2024-08-28 ENCOUNTER — OFFICE VISIT (OUTPATIENT)
Dept: FAMILY MEDICINE CLINIC | Facility: CLINIC | Age: 49
End: 2024-08-28

## 2024-08-28 VITALS
BODY MASS INDEX: 37.56 KG/M2 | WEIGHT: 220 LBS | HEIGHT: 64 IN | SYSTOLIC BLOOD PRESSURE: 128 MMHG | DIASTOLIC BLOOD PRESSURE: 68 MMHG

## 2024-08-28 DIAGNOSIS — D22.9 NEVUS: Primary | ICD-10-CM

## 2024-08-28 PROCEDURE — 11441 EXC FACE-MM B9+MARG 0.6-1 CM: CPT | Performed by: FAMILY MEDICINE

## 2024-08-28 PROCEDURE — 11440 EXC FACE-MM B9+MARG 0.5 CM/<: CPT | Performed by: FAMILY MEDICINE

## 2024-08-28 RX ORDER — TIRZEPATIDE 12.5 MG/.5ML
INJECTION, SOLUTION SUBCUTANEOUS
COMMUNITY
Start: 2024-05-21 | End: 2024-08-28

## 2024-08-28 NOTE — PROGRESS NOTES
8/28/2024  11:24 AM    Eva Beasley is a 49 year old female.    Chief complaint(s):   Chief Complaint   Patient presents with    Procedure     Facial nevus     HPI:     Eva Beasley primary complaint is regarding facial nevus removal.     Patient is a 49-year-old female who is here for nevus removal from her face.  1 around the nose and 1 above the left upper lip.  Consent was obtained and signed.  Recent complication clued bleeding, infection and small scar formation.  Patient agreed to proceed please see surgical note for details.      HISTORY:  Past Medical History:    Hypothyroidism    Polycystic ovarian disease    Rheumatic fever      Past Surgical History:   Procedure Laterality Date    Appendectomy      Colonoscopy N/A 10/28/2023    Procedure: COLONOSCOPY;  Surgeon: Ollie Whalen MD;  Location: [facility] ENDOSCOPY      Family History   Problem Relation Age of Onset    Circulatory Problems Mother         Peripheral vascular disease    Cancer Paternal Grandfather         Liver cancer     Alcohol and Other Disorders Associated Paternal Grandfather         Alcoholism    Diabetes Paternal Grandmother         Type 2      Social History:   Social History     Socioeconomic History    Marital status:    Tobacco Use    Smoking status: Never     Passive exposure: Never    Smokeless tobacco: Never   Vaping Use    Vaping status: Never Used   Substance and Sexual Activity    Alcohol use: No     Alcohol/week: 0.0 standard drinks of alcohol    Drug use: No     Comment: No history of illicit substance abuse        Immunizations:   Immunization History   Administered Date(s) Administered    FLULAVAL 6 months & older 0.5 ml Prefilled syringe (96466) 12/04/2017, 11/02/2018, 10/22/2019, 10/23/2021, 01/14/2023    FLUZONE 6 months and older PFS 0.5 ml (28481) 10/01/2016, 12/04/2017, 11/02/2018, 12/16/2023    Flulaval, 3 Years & >, IM 10/16/2007, 09/28/2010     Fluvirin, 3 Years & >, Im 12/13/2005, 11/02/2006, 11/06/2012    Influenza 11/11/2008, 10/10/2009, 09/23/2014, 09/16/2015    Influenza Virus Vaccine, H1N1 12/19/2009    TD 02/29/2004    TDAP 07/25/2013, 01/14/2023       Medications (Active prior to today's visit):  Current Outpatient Medications   Medication Sig Dispense Refill    escitalopram 10 MG Oral Tab Take 1 tablet (10 mg total) by mouth every morning. 90 tablet 3    Continuous Glucose Sensor (FREESTYLE DEVEN 3 SENSOR) Does not apply Misc USE AS DIRECTED 6 each 1    Tirzepatide (MOUNJARO) 15 MG/0.5ML Subcutaneous Solution Pen-injector Inject 15 mg into the skin once a week. 2 mL 4    gabapentin 100 MG Oral Cap Take 1 capsule (100 mg total) by mouth nightly. 90 capsule 2    DICLOFENAC-MISOPROSTOL 50-0.2 MG Oral Tab EC TAKE 1 TABLET BY MOUTH TWICE DAILY WITH MEALS 180 tablet 1    Norethin-Eth Estrad Triphasic (DASETTA 7/7/7) 0.5/0.75/1-35 MG-MCG Oral Tab Take 1 tablet by mouth daily. 84 tablet 3    ergocalciferol 1.25 MG (74646 UT) Oral Cap Take 1 capsule (50,000 Units total) by mouth once a week. 12 capsule 0    Continuous Blood Gluc Transmit (DEXCOM G6 TRANSMITTER) Does not apply Misc 1 Units continuous. 1 each 2    Continuous Blood Gluc Sensor (DEXCOM G6 SENSOR) Does not apply Misc 1 each Every 10 days. Use as directed every 10 days 3 each 11    levothyroxine 137 MCG Oral Tab Take 137 mcg by mouth before breakfast. 90 tablet 3    pantoprazole 40 MG Oral Tab EC Take 1 tablet (40 mg total) by mouth daily. 30 tablet 3    fluticasone propionate 50 MCG/ACT Nasal Suspension 1 spray by Nasal route 2 (two) times daily. 16 g 3    Gncckrtxf-Dxraxunnt-Cullxcvypn (METAFOLBIC PLUS) 6-2-600 MG Oral Tab Take 1 tablet by mouth daily. 180 tablet 2    Mometasone Furoate 0.1 % External Cream Apply to affected area(s) BID 30 g 1    Glucose Blood (RELION TRUE METRIX TEST STRIPS) In Vitro Strip Use  each 5    simethicone 125 MG Oral Chew Tab Chew 2 tablets (250 mg total) by  mouth every 6 (six) hours as needed for FLATULENCE. 60 tablet 1    MONTELUKAST 10 MG Oral Tab Take 1 tablet by mouth nightly (Patient not taking: Reported on 2024) 30 tablet 0    Loratadine-Pseudoephedrine (EQL ALLERGY/CONGESTION RELIEF OR) Take 1 tablet by mouth Q12H. (Patient not taking: Reported on 2024)         Allergies:  Allergies   Allergen Reactions    Contrave [Naltrexone-Bupropion Hcl Er] DIZZINESS and OTHER (SEE COMMENTS)     Headaches     Victoza OTHER (SEE COMMENTS)     Abdominal pain         ROS:   Review of Systems   Constitutional:  Negative for fever.   HENT:          Facial nevus, X-2       PHYSICAL EXAM:   VS: /68 (BP Location: Left arm, Patient Position: Sitting, Cuff Size: adult)   Ht 5' 4\" (1.626 m)   Wt 220 lb (99.8 kg)   LMP 2024 (Approximate)   BMI 37.76 kg/m²     Physical Exam  Vitals reviewed.   HENT:      Head: Normocephalic.         Eva Beasley with  2/10/1975    Surgical Procedure Note:     /68 (BP Location: Left arm, Patient Position: Sitting, Cuff Size: adult)   Ht 5' 4\" (1.626 m)   Wt 220 lb (99.8 kg)   LMP 2024 (Approximate)   BMI 37.76 kg/m²     Patient Eva Beasley, is a 49 year old female here today for 2 nevus removal.  Informed consent obtained in writing.  Patient expresses understanding that a scar may remain after the lesion is removed. Complication may include infection and/or bleeding.  Sterile technique is observed. Area was cleaned with Betadine solution and sterile field drapes were placed in sterile manner.   Benign appearing lesions are on her left nose and above her left  lip. The method of removal is by excision.   Anesthesia was obtained with 3 cc of 2% lidocaine with epinephrine.   The wound is closed using 3, 2 (#) simple interrupted stitch(es) using 6.0 Nylon.   Hemostasis is achieved with application of pressure.   The specimen is sent for pathology review.   EBL  minimal, minimal.  Patient was instructed on wound care as well as post-op medications for pain control.  Follow up appointment was given in 5 day.      Branden Hunter MD         LABORATORY RESULTS:     EKG / Spirometry : -     Radiology: No results found.     ASSESSMENT/PLAN:   Assessment   Encounter Diagnosis   Name Primary?    Nevus Yes       MEDICATIONS:   Antibiotic ointment        LABORATORY & ORDERS:   Orders Placed This Encounter   Procedures    Specimen to Pathology, Tissue [E]    Specimen to Pathology, Tissue        RECOMMENDATIONS given include: Patient was reassured of  her medical condition and all questions and concerns were answered. Patient was informed to please, call our office with any new or further questions or concerns that may come up in the near future. Notify Dr Hunter or the [facility] if there is a deterioration or worsening of the medical condition. Also, inform the doctor with any new symptoms or medications' side effects.  Keep area clean.     FOLLOW-UP: Schedule a follow-up visit in 6 days.            Orders This Visit:  Orders Placed This Encounter   Procedures    Specimen to Pathology, Tissue [E]    Specimen to Pathology, Tissue       Meds This Visit:  Requested Prescriptions      No prescriptions requested or ordered in this encounter       Imaging & Referrals:  None         BRANDEN HUNTER MD

## 2024-09-03 ENCOUNTER — OFFICE VISIT (OUTPATIENT)
Dept: FAMILY MEDICINE CLINIC | Facility: CLINIC | Age: 49
End: 2024-09-03

## 2024-09-03 VITALS
DIASTOLIC BLOOD PRESSURE: 71 MMHG | HEIGHT: 64 IN | WEIGHT: 220 LBS | BODY MASS INDEX: 37.56 KG/M2 | HEART RATE: 69 BPM | SYSTOLIC BLOOD PRESSURE: 108 MMHG

## 2024-09-03 DIAGNOSIS — D22.9 NEVUS: Primary | ICD-10-CM

## 2024-09-03 RX ORDER — LEVOTHYROXINE SODIUM 137 UG/1
137 TABLET ORAL
Qty: 90 TABLET | Refills: 3 | Status: SHIPPED | OUTPATIENT
Start: 2024-09-03

## 2024-09-03 NOTE — PROGRESS NOTES
9/3/2024  11:13 AM    Eva Beasley is a 49 year old female.    Chief complaint(s):   Chief Complaint   Patient presents with    Follow - Up     F/u after procedure      HPI:     Eva Beasley primary complaint is regarding s/p nevus removal.     Patient is a 49-year-old female who is following up after nevus removal on her nose and above her left upper lip.  Pathology report came back as benign.  Here for suture removals.  No further complaints or concerns.        HISTORY:  Past Medical History:    Hypothyroidism    Polycystic ovarian disease    Rheumatic fever      Past Surgical History:   Procedure Laterality Date    Appendectomy      Colonoscopy N/A 10/28/2023    Procedure: COLONOSCOPY;  Surgeon: Ollie Whalen MD;  Location: [facility] ENDOSCOPY      Family History   Problem Relation Age of Onset    Circulatory Problems Mother         Peripheral vascular disease    Cancer Paternal Grandfather         Liver cancer     Alcohol and Other Disorders Associated Paternal Grandfather         Alcoholism    Diabetes Paternal Grandmother         Type 2      Social History:   Social History     Socioeconomic History    Marital status:    Tobacco Use    Smoking status: Never     Passive exposure: Never    Smokeless tobacco: Never   Vaping Use    Vaping status: Never Used   Substance and Sexual Activity    Alcohol use: No     Alcohol/week: 0.0 standard drinks of alcohol    Drug use: No     Comment: No history of illicit substance abuse        Immunizations:   Immunization History   Administered Date(s) Administered    FLULAVAL 6 months & older 0.5 ml Prefilled syringe (95396) 12/04/2017, 11/02/2018, 10/22/2019, 10/23/2021, 01/14/2023    FLUZONE 6 months and older PFS 0.5 ml (45540) 10/01/2016, 12/04/2017, 11/02/2018, 12/16/2023    Flulaval, 3 Years & >, IM 10/16/2007, 09/28/2010    Fluvirin, 3 Years & >, Im 12/13/2005, 11/02/2006, 11/06/2012    Influenza  11/11/2008, 10/10/2009, 09/23/2014, 09/16/2015    Influenza Virus Vaccine, H1N1 12/19/2009    TD 02/29/2004    TDAP 07/25/2013, 01/14/2023       Medications (Active prior to today's visit):  Current Outpatient Medications   Medication Sig Dispense Refill    levothyroxine 137 MCG Oral Tab Take 137 mcg by mouth before breakfast. 90 tablet 3    escitalopram 10 MG Oral Tab Take 1 tablet (10 mg total) by mouth every morning. 90 tablet 3    Continuous Glucose Sensor (FREESTYLE DEVEN 3 SENSOR) Does not apply Misc USE AS DIRECTED 6 each 1    Tirzepatide (MOUNJARO) 15 MG/0.5ML Subcutaneous Solution Pen-injector Inject 15 mg into the skin once a week. 2 mL 4    gabapentin 100 MG Oral Cap Take 1 capsule (100 mg total) by mouth nightly. 90 capsule 2    DICLOFENAC-MISOPROSTOL 50-0.2 MG Oral Tab EC TAKE 1 TABLET BY MOUTH TWICE DAILY WITH MEALS 180 tablet 1    Norethin-Eth Estrad Triphasic (DASETTA 7/7/7) 0.5/0.75/1-35 MG-MCG Oral Tab Take 1 tablet by mouth daily. 84 tablet 3    MONTELUKAST 10 MG Oral Tab Take 1 tablet by mouth nightly 30 tablet 0    ergocalciferol 1.25 MG (60745 UT) Oral Cap Take 1 capsule (50,000 Units total) by mouth once a week. 12 capsule 0    Continuous Blood Gluc Transmit (DEXCOM G6 TRANSMITTER) Does not apply Misc 1 Units continuous. 1 each 2    Continuous Blood Gluc Sensor (DEXCOM G6 SENSOR) Does not apply Misc 1 each Every 10 days. Use as directed every 10 days 3 each 11    Loratadine-Pseudoephedrine (EQL ALLERGY/CONGESTION RELIEF OR) Take 1 tablet by mouth Q12H.      pantoprazole 40 MG Oral Tab EC Take 1 tablet (40 mg total) by mouth daily. 30 tablet 3    fluticasone propionate 50 MCG/ACT Nasal Suspension 1 spray by Nasal route 2 (two) times daily. 16 g 3    Kybfysoth-Ukfomjowk-Zqfhfrodet (METAFOLBIC PLUS) 6-2-600 MG Oral Tab Take 1 tablet by mouth daily. 180 tablet 2    Mometasone Furoate 0.1 % External Cream Apply to affected area(s) BID 30 g 1    Glucose Blood (RELION TRUE METRIX TEST STRIPS) In  Vitro Strip Use  each 5    simethicone 125 MG Oral Chew Tab Chew 2 tablets (250 mg total) by mouth every 6 (six) hours as needed for FLATULENCE. 60 tablet 1       Allergies:  Allergies   Allergen Reactions    Contrave [Naltrexone-Bupropion Hcl Er] DIZZINESS and OTHER (SEE COMMENTS)     Headaches     Victoza OTHER (SEE COMMENTS)     Abdominal pain         ROS:   Review of Systems   Skin:         Facial nevus removal       PHYSICAL EXAM:   VS: /71 (BP Location: Left arm, Patient Position: Sitting, Cuff Size: adult)   Pulse 69   Ht 5' 4\" (1.626 m)   Wt 220 lb (99.8 kg)   LMP 06/08/2024 (Approximate)   BMI 37.76 kg/m²     Physical Exam  Vitals reviewed.   Constitutional:       Appearance: Normal appearance.   HENT:      Head:      Comments: Suture removal done, and steri strips applied        LABORATORY RESULTS:   No results found for: \"URCOLOR\", \"URCLA\", \"URINELEUK\", \"URINENITRITE\", \"URINEBLOOD\"   Results for orders placed or performed in visit on 08/28/24   Specimen to Pathology, Tissue   Result Value Ref Range    Case Report       Surgical Pathology                                Case: [id number]                                  Authorizing Provider:  Carter Weir MD      Collected:           08/28/2024 01:29 PM          Ordering Location:     [facility]    Received:            08/28/2024 01:29 PM                                 [facility]                                                                      Pathologist:           Kristian Harris MD                                                        Specimens:   A) - Lip, upper                                                                                     B) - Nose                                                                                  Final Diagnosis:         A. Skin, upper lip; excision:  Intradermal melanocytic  nevus, focally extending to lateral edge of specimen.      B. Skin, nose; excision:  Intradermal melanocytic nevus, completely excised.          Embedded Images      Clinical Information       D22.9 Nevus.         Gross Description       Specimen A is labeled \"Destin Chowdhuryierrez, upper lip\" received in formalin. The specimen consists of a skin nodule measuring 0.3 x 0.2 x 0.2 cm. The resection margin is inked black. The specimen is bisected and submitted entirely in cassette A1.     Specimen B is labeled \"Destin Beasley, nose\" received in formalin. The specimen consists of a skin nodule measuring 0.3 x 0.2 x 0.2 cm. The resection margin is inked black. The specimen is bisected and submitted entirely in cassette B1. (jq)     Kristian Harris M.D./enriqueta      Specimen to Pathology, Tissue [E]   Result Value Ref Range    Case Report       Surgical Pathology                                Case: [id number]                                  Authorizing Provider:  Carter Weir MD      Collected:           08/28/2024 01:29 PM          Ordering Location:     [facility]    Received:            08/28/2024 01:29 PM                                 [facility]                                                                      Pathologist:           Kristian Harris MD                                                        Specimens:   A) - Lip, upper                                                                                     B) - Nose                                                                                  Final Diagnosis:         A. Skin, upper lip; excision:  Intradermal melanocytic nevus, focally extending to lateral edge of specimen.      B. Skin, nose; excision:  Intradermal melanocytic nevus, completely excised.          Embedded Images      Clinical Information       D22.9 Nevus.          Gross Description       Specimen A is labeled \"Destin Beasley, upper lip\" received in formalin. The specimen consists of a skin nodule measuring 0.3 x 0.2 x 0.2 cm. The resection margin is inked black. The specimen is bisected and submitted entirely in cassette A1.     Specimen B is labeled \"Destin Beasley, nose\" received in formalin. The specimen consists of a skin nodule measuring 0.3 x 0.2 x 0.2 cm. The resection margin is inked black. The specimen is bisected and submitted entirely in cassette B1. (jq)     Kristian Harris M.D./enriqueta          EKG / Spirometry : -     Radiology: No results found.     ASSESSMENT/PLAN:   Assessment   Encounter Diagnosis   Name Primary?    Nevus Yes     Doing well  CPM  Keep area clean  RTC prn         Orders This Visit:  No orders of the defined types were placed in this encounter.      Meds This Visit:  Requested Prescriptions     Signed Prescriptions Disp Refills    levothyroxine 137 MCG Oral Tab 90 tablet 3     Sig: Take 137 mcg by mouth before breakfast.       Imaging & Referrals:  None         BRANDEN HUNTER MD

## 2024-09-05 RX ORDER — DICLOFENAC SODIUM AND MISOPROSTOL 50; 200 MG/1; UG/1
1 TABLET, DELAYED RELEASE ORAL 2 TIMES DAILY WITH MEALS
Qty: 180 TABLET | Refills: 1 | Status: SHIPPED | OUTPATIENT
Start: 2024-09-05

## 2024-09-05 NOTE — TELEPHONE ENCOUNTER
Please review: Medication passes protocol, but unable to refill due to medium/high drug interaction warning copied here:    High  Drug-Drug: Diclofenac-miSOPROStol and escitalopramToxic effects may be increased with concurrent administration of Diclofenac (Oral) and Selective Serotonin Reuptake Inhibitors. The risk of upper gastrointestinal bleeding may be increased. Patients taking both drugs concurrently should be educated about the signs and symptoms of GI bleeding.    Medium  Duplicate Therapy: Diclofenac-miSOPROStol, pantoprazolePEPTIC ULCER AGENTS. No Abuse/Dependency Potential.    Refill passes per [facility] protocol.    Future Appointments   Date Time Provider Department Center   10/19/2024  9:15 AM Carter Weir MD [facility]     LAST OFFICE VISIT: 9/3/2024    Requested Prescriptions   Pending Prescriptions Disp Refills    DICLOFENAC-MISOPROSTOL 50-0.2 MG Oral Tab EC [Pharmacy Med Name: Diclofenac-miSOPROStol 50-0.2 MG Oral Tablet Delayed Release] 180 tablet 0     Sig: TAKE 1 TABLET BY MOUTH TWICE DAILY WITH MEALS       Non-Narcotic Pain Medication Protocol Passed - 9/3/2024  6:07 AM        Passed - In person appointment or virtual visit in the past 6 mos or appointment in next 3 mos     Recent Outpatient Visits              2 days ago [facility], Carter White MD    Office Visit    1 week ago [facility], Carter White MD    Office Visit    1 month ago Left ovarian cyst    [facility] Emigdio Waters MD    Office Visit    1 month ago Type 2 diabetes mellitus without complication, without long-term current use of insulin ([facility])    [facility], Carter White MD    Office Visit    4 months ago Menorrhagia with irregular cycle    [facility]  OB/GYN Emigdio Waters MD    Office Visit          Future Appointments         Provider Department Appt Notes    In 1 month Carter Weir MD [facility] Return in about 3 months (around 10/13/2024), or physical exam                         Future Appointments         Provider Department Appt Notes    In 1 month Carter Weir MD [facility] Return in about 3 months (around 10/13/2024), or physical exam          [facility]its              [facility]    [facility]    Office Visit    1 month ago Left ovarian cyst    [facility] - OB/GYN Emigdio Waters MD    Office Visit    1 month ago Type 2 diabetes mellitus without complication, without long-term current use of insulin [facility]    4 months ago Menorrhagia with irregular cycle    [facility] - OB/GYN Emigdio Waters MD    Office Visit

## 2024-09-30 ENCOUNTER — TELEPHONE (OUTPATIENT)
Dept: FAMILY MEDICINE CLINIC | Facility: CLINIC | Age: 49
End: 2024-09-30

## 2024-10-01 ENCOUNTER — NURSE TRIAGE (OUTPATIENT)
Dept: FAMILY MEDICINE CLINIC | Facility: CLINIC | Age: 49
End: 2024-10-01

## 2024-10-01 NOTE — TELEPHONE ENCOUNTER
Please reply to pool: EM RN TRIAGE  Action Requested: Summary for Provider     []  Critical Lab, Recommendations Needed  [x] Need Additional Advice  []   FYI    []   Need Orders  [] Need Medications Sent to Pharmacy  []  Other     SUMMARY: Patient contacts clinic reporting chronic right knee pain.  Pain has gotten worse over the last 2 weeks.  Denies swelling or redness.  Denies new neuropathy symptoms. Taking diclofenac misoprostol which is no longer effective.  Requesting new medication, states Dr. Weir is aware of this complaint.  Please advise.     Reason for call: Knee Pain  Onset: Data Unavailable                       Reason for Disposition   Knee pain is a chronic symptom (recurrent or ongoing AND lasting > 4 weeks)    Protocols used: Knee Pain-A-[place]

## 2024-10-03 NOTE — TELEPHONE ENCOUNTER
Chart reviewed. See 10/1/24 triage encounter. This message has been addressed. No further action needed.   
Left a message on cell to call back and provided number and hours. Called the home phone listed on release form [phone] and it was not a working number  I also sent a my chart in Bulgarian    Will postpone to verify it's read.  
Patient called regarding the following medication:    Diclofenac-miSOPROStol 50-0.2 MG Oral Tab EC     Per patient that medication is no longer helping her neuropathy pain and she wants to be prescribed something else. Per patient she took three pills yesterday due to a lot of pain.  
With [employer]  Diogo ID# [id number]    Left message to call back.    
- - -

## 2024-10-14 ENCOUNTER — OFFICE VISIT (OUTPATIENT)
Dept: FAMILY MEDICINE CLINIC | Facility: CLINIC | Age: 49
End: 2024-10-14

## 2024-10-14 VITALS
BODY MASS INDEX: 38.76 KG/M2 | HEIGHT: 64 IN | HEART RATE: 68 BPM | WEIGHT: 227 LBS | SYSTOLIC BLOOD PRESSURE: 134 MMHG | DIASTOLIC BLOOD PRESSURE: 89 MMHG

## 2024-10-14 DIAGNOSIS — M65.961 SYNOVITIS OF RIGHT KNEE: Primary | ICD-10-CM

## 2024-10-14 RX ORDER — METHYLPREDNISOLONE ACETATE 80 MG/ML
80 INJECTION, SUSPENSION INTRA-ARTICULAR; INTRALESIONAL; INTRAMUSCULAR; SOFT TISSUE ONCE
Status: COMPLETED | OUTPATIENT
Start: 2024-10-14 | End: 2024-10-14

## 2024-10-14 RX ORDER — NORETHINDRONE AND ETHINYL ESTRADIOL 7 DAYS X 3
1 KIT ORAL DAILY
Qty: 84 TABLET | Refills: 3 | Status: SHIPPED | OUTPATIENT
Start: 2024-10-14

## 2024-10-14 RX ORDER — GABAPENTIN 100 MG/1
100 CAPSULE ORAL 3 TIMES DAILY
Qty: 90 CAPSULE | Refills: 1 | Status: SHIPPED | OUTPATIENT
Start: 2024-10-14

## 2024-10-14 RX ADMIN — METHYLPREDNISOLONE ACETATE 80 MG: 80 INJECTION, SUSPENSION INTRA-ARTICULAR; INTRALESIONAL; INTRAMUSCULAR; SOFT TISSUE at 18:36:00

## 2024-10-14 NOTE — PROGRESS NOTES
10/14/2024  5:17 PM    Eva Beasley is a 49 year old female.    Chief complaint(s):   Chief Complaint   Patient presents with    Knee Pain     Bilateral knee pain mostly the right knee, popping, unable to bend, pounding sensation, taking OTC Aleve with no relief     Menstrual Problem     2 menstrual cycles month of Sept     HPI:     Eva Beasley primary complaint is regarding right knee pain.     Patient is a 49-year-old female who presents complaining of increasing right knee pain over the past 2 weeks.  Denies any trauma accidents or injuries.  Has noticed some swelling involving the knee.  The pain is more generalized.  Has tried multiple NSAIDs over-the-counter which are slowly helping but continues to have difficulty especially with weightbearing or walking.  Discussed the options of treatments and agreed to start with a intra-articular injection today.      HISTORY:  Past Medical History:    Hypothyroidism    Polycystic ovarian disease    Rheumatic fever      Past Surgical History:   Procedure Laterality Date    Appendectomy      Colonoscopy N/A 10/28/2023    Procedure: COLONOSCOPY;  Surgeon: Ollie Whalen MD;  Location: [facility] ENDOSCOPY      Family History   Problem Relation Age of Onset    Circulatory Problems Mother         Peripheral vascular disease    Cancer Paternal Grandfather         Liver cancer     Alcohol and Other Disorders Associated Paternal Grandfather         Alcoholism    Diabetes Paternal Grandmother         Type 2      Social History:   Social History     Socioeconomic History    Marital status:    Tobacco Use    Smoking status: Never     Passive exposure: Never    Smokeless tobacco: Never   Vaping Use    Vaping status: Never Used   Substance and Sexual Activity    Alcohol use: No     Alcohol/week: 0.0 standard drinks of alcohol    Drug use: No     Comment: No history of illicit substance abuse        Immunizations:    Immunization History   Administered Date(s) Administered    FLULAVAL 6 months & older 0.5 ml Prefilled syringe (78693) 12/04/2017, 11/02/2018, 10/22/2019, 10/23/2021, 01/14/2023    FLUZONE 6 months and older PFS 0.5 ml (59213) 10/01/2016, 12/04/2017, 11/02/2018, 12/16/2023    Flulaval, 3 Years & >, IM 10/16/2007, 09/28/2010    Fluvirin, 3 Years & >, Im 12/13/2005, 11/02/2006, 11/06/2012    Influenza 11/11/2008, 10/10/2009, 09/23/2014, 09/16/2015    Influenza Virus Vaccine, H1N1 12/19/2009    TD 02/29/2004    TDAP 07/25/2013, 01/14/2023       Medications (Active prior to today's visit):  Current Outpatient Medications   Medication Sig Dispense Refill    gabapentin 100 MG Oral Cap Take 1 capsule (100 mg total) by mouth 3 (three) times daily. 90 capsule 1    Norethin-Eth Estrad Triphasic (DASETTA 7/7/7) 0.5/0.75/1-35 MG-MCG Oral Tab Take 1 tablet by mouth daily. 84 tablet 3    Diclofenac-miSOPROStol 50-0.2 MG Oral Tab EC Take 1 tablet by mouth 2 (two) times daily with meals. 180 tablet 1    levothyroxine 137 MCG Oral Tab Take 137 mcg by mouth before breakfast. 90 tablet 3    escitalopram 10 MG Oral Tab Take 1 tablet (10 mg total) by mouth every morning. 90 tablet 3    Continuous Glucose Sensor (FREESTYLE DEVEN 3 SENSOR) Does not apply Misc USE AS DIRECTED 6 each 1    Tirzepatide (MOUNJARO) 15 MG/0.5ML Subcutaneous Solution Pen-injector Inject 15 mg into the skin once a week. 2 mL 4    ergocalciferol 1.25 MG (81964 UT) Oral Cap Take 1 capsule (50,000 Units total) by mouth once a week. 12 capsule 0    Loratadine-Pseudoephedrine (EQL ALLERGY/CONGESTION RELIEF OR) Take 1 tablet by mouth Q12H.      simethicone 125 MG Oral Chew Tab Chew 2 tablets (250 mg total) by mouth every 6 (six) hours as needed for FLATULENCE. 60 tablet 1    pantoprazole 40 MG Oral Tab EC Take 1 tablet (40 mg total) by mouth daily. (Patient not taking: Reported on 10/14/2024) 30 tablet 3    fluticasone propionate 50 MCG/ACT Nasal Suspension 1 spray by  Nasal route 2 (two) times daily. (Patient not taking: Reported on 10/14/2024) 16 g 3       Allergies:  Allergies[1]      ROS:   Review of Systems   Constitutional:  Negative for appetite change and fever.   Eyes:  Negative for visual disturbance.   Respiratory:  Negative for shortness of breath.    Cardiovascular:  Negative for chest pain.   Gastrointestinal:  Negative for abdominal pain, nausea and vomiting.   Musculoskeletal:  Negative for back pain.        Knee pain   Skin:  Negative for rash.   Neurological:  Negative for dizziness and headaches.       PHYSICAL EXAM:   VS: /89 (BP Location: Right arm, Patient Position: Sitting, Cuff Size: large)   Pulse 68   Ht 5' 4\" (1.626 m)   Wt 227 lb (103 kg)   LMP 09/29/2024 (Approximate)   BMI 38.96 kg/m²     Physical Exam  Vitals reviewed.   Constitutional:       General: She is not in acute distress.     Appearance: Normal appearance. She is obese.   HENT:      Head: Normocephalic.   Eyes:      Conjunctiva/sclera: Conjunctivae normal.   Cardiovascular:      Rate and Rhythm: Normal rate.   Pulmonary:      Effort: Pulmonary effort is normal.   Musculoskeletal:      Cervical back: Neck supple.      Comments: Knee + swelling, NT, slight decrease ROM   Skin:     Findings: No rash.   Psychiatric:         Mood and Affect: Mood normal.         Procedure:  /89 (BP Location: Right arm, Patient Position: Sitting, Cuff Size: large)   Pulse 68   Ht 5' 4\" (1.626 m)   Wt 227 lb (103 kg)   LMP 09/29/2024 (Approximate)   BMI 38.96 kg/m²     Diagnosis/Indication: synovitis  .   Procedure Note:  Intra-articular Injection   Injection involves the right knee.  Written informed consent was obtained.  The site was prepped with alcohol and betadine.  The site was anesthetized with 1 cc of 1% lidocaine.  The joint was injected with (80mg) 1 cc of Methylprednisolone in 1 cc of 1% lidocaine.  The needle was carefully introduced into the joint space. The patient experienced  mild discomfort during the procedure.  No complications.  Estimated blood loss: none.      FOLLOW UP; RTC appointment given as needed and/or keep present appointment.      Branden Hunter MD    LABORATORY RESULTS:     EKG / Spirometry : -     Radiology: No results found.     ASSESSMENT/PLAN:   Assessment   Encounter Diagnosis   Name Primary?    Synovitis of right knee Yes       MEDICATIONS:     Requested Prescriptions     Signed Prescriptions Disp Refills    gabapentin 100 MG Oral Cap 90 capsule 1     Sig: Take 1 capsule (100 mg total) by mouth 3 (three) times daily.      RECOMMENDATIONS given include: Patient was reassured of  her medical condition and all questions and concerns were answered. Patient was informed to please, call our office with any new or further questions or concerns that may come up in the near future. Notify Dr Hunter or the [facility] if there is a deterioration or worsening of the medical condition. Also, inform the doctor with any new symptoms or medications' side effects.      FOLLOW-UP: Schedule a follow-up visit in  3 weeks.            Orders This Visit:  No orders of the defined types were placed in this encounter.      Meds This Visit:  Requested Prescriptions     Signed Prescriptions Disp Refills    gabapentin 100 MG Oral Cap 90 capsule 1     Sig: Take 1 capsule (100 mg total) by mouth 3 (three) times daily.    Norethin-Eth Estrad Triphasic (DASETTA 7/7/7) 0.5/0.75/1-35 MG-MCG Oral Tab 84 tablet 3     Sig: Take 1 tablet by mouth daily.       Imaging & Referrals:  None         BRANDEN HUNTER MD         [1]   Allergies  Allergen Reactions    Contrave [Naltrexone-Bupropion Hcl Er] DIZZINESS and OTHER (SEE COMMENTS)     Headaches     Victoza OTHER (SEE COMMENTS)     Abdominal pain

## 2024-10-16 ENCOUNTER — TELEPHONE (OUTPATIENT)
Dept: FAMILY MEDICINE CLINIC | Facility: CLINIC | Age: 49
End: 2024-10-16

## 2024-10-16 RX ORDER — IBUPROFEN 600 MG/1
600 TABLET, FILM COATED ORAL EVERY 6 HOURS PRN
Qty: 60 TABLET | Refills: 0 | Status: SHIPPED | OUTPATIENT
Start: 2024-10-16

## 2024-10-16 NOTE — TELEPHONE ENCOUNTER
Pt called stated that she still has pain and has been taking the diclofenac and advil. Would like to know if you could give her something stronger. Please advise.

## 2024-10-17 NOTE — TELEPHONE ENCOUNTER
Phone call made, spoke with patient. Reassured, give time for intraarticular injection to work. May take Ibuprofen Q 8 hrs prn pain. Rest no exercise for now.

## 2024-10-17 NOTE — TELEPHONE ENCOUNTER
Patient contacted and informed of new rx ibuprofen 600mg sent to pharmacy. Patient states she picked up the medication yesterday. Began taking last night and today also took 2nd dose of ibuprofen. So far, patient denies any knee pain relief. Also has been using/taking voltaren gel and diclofenac. I advised patient she should not be taking diclofenac since she is now taking ibuprofen 600mg. Patient voiced agreement/understanding.     Please advise if any new or additional recommendations at this time regarding knee pain?  Has tried wearing knee brace as well. However she states knee pain worsens with knee brace.  Sitting or laying down provides mild pain relief. Knee pain worsen with ambulation.

## 2024-10-19 ENCOUNTER — LAB ENCOUNTER (OUTPATIENT)
Dept: LAB | Age: 49
End: 2024-10-19
Attending: FAMILY MEDICINE
Payer: COMMERCIAL

## 2024-10-19 ENCOUNTER — OFFICE VISIT (OUTPATIENT)
Dept: FAMILY MEDICINE CLINIC | Facility: CLINIC | Age: 49
End: 2024-10-19

## 2024-10-19 VITALS
HEIGHT: 64 IN | WEIGHT: 223 LBS | SYSTOLIC BLOOD PRESSURE: 125 MMHG | DIASTOLIC BLOOD PRESSURE: 77 MMHG | BODY MASS INDEX: 38.07 KG/M2

## 2024-10-19 DIAGNOSIS — Z12.31 ENCOUNTER FOR SCREENING MAMMOGRAM FOR MALIGNANT NEOPLASM OF BREAST: ICD-10-CM

## 2024-10-19 DIAGNOSIS — E66.813 CLASS 3 SEVERE OBESITY DUE TO EXCESS CALORIES WITH SERIOUS COMORBIDITY AND BODY MASS INDEX (BMI) OF 45.0 TO 49.9 IN ADULT (HCC): ICD-10-CM

## 2024-10-19 DIAGNOSIS — E66.01 CLASS 3 SEVERE OBESITY DUE TO EXCESS CALORIES WITH SERIOUS COMORBIDITY AND BODY MASS INDEX (BMI) OF 45.0 TO 49.9 IN ADULT (HCC): ICD-10-CM

## 2024-10-19 DIAGNOSIS — Z00.00 PHYSICAL EXAM: Primary | ICD-10-CM

## 2024-10-19 DIAGNOSIS — Z00.00 PHYSICAL EXAM: ICD-10-CM

## 2024-10-19 LAB
ALBUMIN SERPL-MCNC: 4.8 G/DL (ref 3.2–4.8)
ALBUMIN/GLOB SERPL: 1.5 {RATIO} (ref 1–2)
ALP LIVER SERPL-CCNC: 69 U/L
ALT SERPL-CCNC: 20 U/L
ANION GAP SERPL CALC-SCNC: 8 MMOL/L (ref 0–18)
AST SERPL-CCNC: 25 U/L (ref ?–34)
BASOPHILS # BLD AUTO: 0.06 X10(3) UL (ref 0–0.2)
BASOPHILS NFR BLD AUTO: 0.9 %
BILIRUB SERPL-MCNC: 0.6 MG/DL (ref 0.3–1.2)
BILIRUB UR QL: NEGATIVE
BUN BLD-MCNC: 16 MG/DL (ref 9–23)
BUN/CREAT SERPL: 23.2 (ref 10–20)
CALCIUM BLD-MCNC: 9.5 MG/DL (ref 8.7–10.4)
CHLORIDE SERPL-SCNC: 105 MMOL/L (ref 98–112)
CHOLEST SERPL-MCNC: 219 MG/DL (ref ?–200)
CLARITY UR: CLEAR
CO2 SERPL-SCNC: 27 MMOL/L (ref 21–32)
CREAT BLD-MCNC: 0.69 MG/DL
DEPRECATED RDW RBC AUTO: 41.7 FL (ref 35.1–46.3)
EGFRCR SERPLBLD CKD-EPI 2021: 106 ML/MIN/1.73M2 (ref 60–?)
EOSINOPHIL # BLD AUTO: 0.13 X10(3) UL (ref 0–0.7)
EOSINOPHIL NFR BLD AUTO: 2 %
ERYTHROCYTE [DISTWIDTH] IN BLOOD BY AUTOMATED COUNT: 13 % (ref 11–15)
EST. AVERAGE GLUCOSE BLD GHB EST-MCNC: 105 MG/DL (ref 68–126)
FASTING PATIENT LIPID ANSWER: YES
FASTING STATUS PATIENT QL REPORTED: YES
GLOBULIN PLAS-MCNC: 3.2 G/DL (ref 2–3.5)
GLUCOSE BLD-MCNC: 82 MG/DL (ref 70–99)
GLUCOSE UR-MCNC: NORMAL MG/DL
HBA1C MFR BLD: 5.3 % (ref ?–5.7)
HCT VFR BLD AUTO: 37.3 %
HDLC SERPL-MCNC: 84 MG/DL (ref 40–59)
HGB BLD-MCNC: 12.6 G/DL
HGB UR QL STRIP.AUTO: NEGATIVE
IMM GRANULOCYTES # BLD AUTO: 0.01 X10(3) UL (ref 0–1)
IMM GRANULOCYTES NFR BLD: 0.2 %
KETONES UR-MCNC: NEGATIVE MG/DL
LDLC SERPL CALC-MCNC: 125 MG/DL (ref ?–100)
LEUKOCYTE ESTERASE UR QL STRIP.AUTO: NEGATIVE
LYMPHOCYTES # BLD AUTO: 2.17 X10(3) UL (ref 1–4)
LYMPHOCYTES NFR BLD AUTO: 34.1 %
MCH RBC QN AUTO: 29.6 PG (ref 26–34)
MCHC RBC AUTO-ENTMCNC: 33.8 G/DL (ref 31–37)
MCV RBC AUTO: 87.8 FL
MONOCYTES # BLD AUTO: 0.5 X10(3) UL (ref 0.1–1)
MONOCYTES NFR BLD AUTO: 7.9 %
NEUTROPHILS # BLD AUTO: 3.49 X10 (3) UL (ref 1.5–7.7)
NEUTROPHILS # BLD AUTO: 3.49 X10(3) UL (ref 1.5–7.7)
NEUTROPHILS NFR BLD AUTO: 54.9 %
NITRITE UR QL STRIP.AUTO: NEGATIVE
NONHDLC SERPL-MCNC: 135 MG/DL (ref ?–130)
OSMOLALITY SERPL CALC.SUM OF ELEC: 290 MOSM/KG (ref 275–295)
PH UR: 6 [PH] (ref 5–8)
PLATELET # BLD AUTO: 253 10(3)UL (ref 150–450)
POTASSIUM SERPL-SCNC: 4.3 MMOL/L (ref 3.5–5.1)
PROT SERPL-MCNC: 8 G/DL (ref 5.7–8.2)
PROT UR-MCNC: NEGATIVE MG/DL
RBC # BLD AUTO: 4.25 X10(6)UL
SODIUM SERPL-SCNC: 140 MMOL/L (ref 136–145)
SP GR UR STRIP: 1.02 (ref 1–1.03)
TRIGL SERPL-MCNC: 57 MG/DL (ref 30–149)
TSI SER-ACNC: 2.84 MIU/ML (ref 0.55–4.78)
UROBILINOGEN UR STRIP-ACNC: NORMAL
VIT D+METAB SERPL-MCNC: 51.2 NG/ML (ref 30–100)
VLDLC SERPL CALC-MCNC: 10 MG/DL (ref 0–30)
WBC # BLD AUTO: 6.4 X10(3) UL (ref 4–11)

## 2024-10-19 PROCEDURE — 84443 ASSAY THYROID STIM HORMONE: CPT | Performed by: FAMILY MEDICINE

## 2024-10-19 PROCEDURE — 36415 COLL VENOUS BLD VENIPUNCTURE: CPT | Performed by: FAMILY MEDICINE

## 2024-10-19 PROCEDURE — 80053 COMPREHEN METABOLIC PANEL: CPT | Performed by: FAMILY MEDICINE

## 2024-10-19 PROCEDURE — 80061 LIPID PANEL: CPT | Performed by: FAMILY MEDICINE

## 2024-10-19 PROCEDURE — 90656 IIV3 VACC NO PRSV 0.5 ML IM: CPT | Performed by: FAMILY MEDICINE

## 2024-10-19 PROCEDURE — 83036 HEMOGLOBIN GLYCOSYLATED A1C: CPT | Performed by: FAMILY MEDICINE

## 2024-10-19 PROCEDURE — 82306 VITAMIN D 25 HYDROXY: CPT

## 2024-10-19 PROCEDURE — 85025 COMPLETE CBC W/AUTO DIFF WBC: CPT | Performed by: FAMILY MEDICINE

## 2024-10-19 PROCEDURE — 99396 PREV VISIT EST AGE 40-64: CPT | Performed by: FAMILY MEDICINE

## 2024-10-19 PROCEDURE — 81003 URINALYSIS AUTO W/O SCOPE: CPT | Performed by: FAMILY MEDICINE

## 2024-10-19 PROCEDURE — 90471 IMMUNIZATION ADMIN: CPT | Performed by: FAMILY MEDICINE

## 2024-10-19 RX ORDER — PHENTERMINE AND TOPIRAMATE 7.5; 46 MG/1; MG/1
1 CAPSULE, EXTENDED RELEASE ORAL DAILY
Qty: 30 CAPSULE | Refills: 1 | Status: SHIPPED | OUTPATIENT
Start: 2024-10-19

## 2024-10-19 RX ORDER — PHENTERMINE AND TOPIRAMATE 3.75; 23 MG/1; MG/1
1 CAPSULE, EXTENDED RELEASE ORAL DAILY
Qty: 14 CAPSULE | Refills: 0 | Status: SHIPPED | OUTPATIENT
Start: 2024-10-19

## 2024-10-19 NOTE — PROGRESS NOTES
10/19/2024  9:31 AM    Eva Beasley is a 49 year old female.    Chief complaint(s):   Chief Complaint   Patient presents with    Routine Physical     HPI:     Eva Beasley primary complaint is regarding CPE.     Eva Trevino is a 49 year old female present today for a routine periodic health gynecological screening and/or complete physical examination.  Her last physical exam was 1 year(s) ago. Patient's last menstrual period was 09/29/2024 (approximate). Menarche occurred at age17.  She is currently using  BCPs as a form of contraception.    Eva Trevino is G 2, P1, Ab 1.  She has a history of veneral infection significant for none. She performs breast self-exams ocationaly .  Her last TDAP (orTD) booster was 10 years ago.  She is current with her influenza immunization.  Her last Pap smear was 1 year(s) ago and was normal. Her last mammogram was 1 yr ago and was normal.  Regarding colon cancer  screening test she underwent colonoscopy in 2023. None smoker.    In addition patient is requesting assistance with weight management.  She is on higher doses of Mounjaro and is willing to start adding a second medication for weight management.  She is unable to do much physical activity due to her recent synovitis of the right knee.      HISTORY:  Past Medical History:    Hypothyroidism    Polycystic ovarian disease    Rheumatic fever      Past Surgical History:   Procedure Laterality Date    Appendectomy      Colonoscopy N/A 10/28/2023    Procedure: COLONOSCOPY;  Surgeon: Ollie Whalen MD;  Location: [facility] ENDOSCOPY      Family History   Problem Relation Age of Onset    Circulatory Problems Mother         Peripheral vascular disease    Cancer Paternal Grandfather         Liver cancer     Alcohol and Other Disorders Associated Paternal Grandfather         Alcoholism    Diabetes Paternal Grandmother         Type 2      Social History:   Social  History     Socioeconomic History    Marital status:    Tobacco Use    Smoking status: Never     Passive exposure: Never    Smokeless tobacco: Never   Vaping Use    Vaping status: Never Used   Substance and Sexual Activity    Alcohol use: No     Alcohol/week: 0.0 standard drinks of alcohol    Drug use: No     Comment: No history of illicit substance abuse        Immunizations:   Immunization History   Administered Date(s) Administered    FLULAVAL 6 months & older 0.5 ml Prefilled syringe (58349) 12/04/2017, 11/02/2018, 10/22/2019, 10/23/2021, 01/14/2023    FLUZONE 6 months and older PFS 0.5 ml (48229) 10/01/2016, 12/04/2017, 11/02/2018, 12/16/2023    Flulaval, 3 Years & >, IM 10/16/2007, 09/28/2010    Fluvirin, 3 Years & >, Im 12/13/2005, 11/02/2006, 11/06/2012    Influenza 11/11/2008, 10/10/2009, 09/23/2014, 09/16/2015    Influenza Virus Vaccine, H1N1 12/19/2009    TD 02/29/2004    TDAP 07/25/2013, 01/14/2023   Pended Date(s) Pended    Influenza Vaccine, trivalent (IIV3), PF 0.5mL (80637) 10/19/2024       Medications (Active prior to today's visit):  Current Outpatient Medications   Medication Sig Dispense Refill    Phentermine-Topiramate (QSYMIA) 3.75-23 MG Oral Capsule SR 24 Hr Take 1 tablet by mouth daily. 14 capsule 0    Phentermine-Topiramate (QSYMIA) 7.5-46 MG Oral Capsule SR 24 Hr Take 1 tablet by mouth daily. 30 capsule 1    ibuprofen 600 MG Oral Tab Take 1 tablet (600 mg total) by mouth every 6 (six) hours as needed for Pain. Always take it with food. 60 tablet 0    gabapentin 100 MG Oral Cap Take 1 capsule (100 mg total) by mouth 3 (three) times daily. 90 capsule 1    Norethin-Eth Estrad Triphasic (DASETTA 7/7/7) 0.5/0.75/1-35 MG-MCG Oral Tab Take 1 tablet by mouth daily. 84 tablet 3    Diclofenac-miSOPROStol 50-0.2 MG Oral Tab EC Take 1 tablet by mouth 2 (two) times daily with meals. 180 tablet 1    levothyroxine 137 MCG Oral Tab Take 137 mcg by mouth before breakfast. 90 tablet 3    escitalopram  10 MG Oral Tab Take 1 tablet (10 mg total) by mouth every morning. 90 tablet 3    Continuous Glucose Sensor (FREESTYLE DEVEN 3 SENSOR) Does not apply Misc USE AS DIRECTED 6 each 1    Tirzepatide (MOUNJARO) 15 MG/0.5ML Subcutaneous Solution Pen-injector Inject 15 mg into the skin once a week. 2 mL 4    ergocalciferol 1.25 MG (04291 UT) Oral Cap Take 1 capsule (50,000 Units total) by mouth once a week. 12 capsule 0    Loratadine-Pseudoephedrine (EQL ALLERGY/CONGESTION RELIEF OR) Take 1 tablet by mouth Q12H.      pantoprazole 40 MG Oral Tab EC Take 1 tablet (40 mg total) by mouth daily. (Patient not taking: Reported on 10/14/2024) 30 tablet 3    fluticasone propionate 50 MCG/ACT Nasal Suspension 1 spray by Nasal route 2 (two) times daily. (Patient not taking: Reported on 10/14/2024) 16 g 3    simethicone 125 MG Oral Chew Tab Chew 2 tablets (250 mg total) by mouth every 6 (six) hours as needed for FLATULENCE. 60 tablet 1       Allergies:  Allergies[1]      ROS:   Review of Systems   Constitutional:  Positive for unexpected weight change (obese). Negative for appetite change, fatigue and fever.   HENT:  Negative for ear pain, hearing loss and nosebleeds.    Eyes:  Negative for visual disturbance.   Respiratory:  Negative for apnea, shortness of breath and wheezing.    Cardiovascular:  Negative for chest pain, palpitations and leg swelling.   Gastrointestinal:  Negative for abdominal pain, blood in stool, constipation, nausea and vomiting.   Endocrine: Negative for polydipsia and polyuria.   Genitourinary:  Negative for dyspareunia and menstrual problem.   Musculoskeletal:  Negative for arthralgias and back pain.        Right knee pain   Skin:  Negative for rash.   Allergic/Immunologic: Negative for food allergies.   Neurological:  Negative for dizziness, syncope, light-headedness and headaches.   Psychiatric/Behavioral:  Negative for sleep disturbance.        PHYSICAL EXAM:   VS: /77 (BP Location: Left arm, Patient  Position: Sitting, Cuff Size: large)   Ht 5' 4\" (1.626 m)   Wt 223 lb (101.2 kg)   LMP 09/29/2024 (Approximate)   BMI 38.28 kg/m²     Physical Exam  Vitals reviewed.   Constitutional:       Appearance: She is well-developed.   HENT:      Head: Normocephalic.      Right Ear: Hearing, tympanic membrane, ear canal and external ear normal.      Left Ear: Hearing, tympanic membrane, ear canal and external ear normal.      Nose: Nose normal.      Mouth/Throat:      Mouth: Mucous membranes are moist.   Eyes:      Extraocular Movements: Extraocular movements intact.      Conjunctiva/sclera: Conjunctivae normal.      Pupils: Pupils are equal, round, and reactive to light.   Neck:      Thyroid: No thyromegaly.   Cardiovascular:      Rate and Rhythm: Normal rate and regular rhythm.      Heart sounds: Normal heart sounds, S1 normal and S2 normal. No murmur heard.  Pulmonary:      Effort: Pulmonary effort is normal.      Breath sounds: Normal breath sounds.   Chest:   Breasts:     Right: No mass.      Left: No mass.   Abdominal:      General: Bowel sounds are normal.      Palpations: Abdomen is soft. There is no mass.      Tenderness: There is no abdominal tenderness.      Hernia: No hernia is present.   Musculoskeletal:      Cervical back: Neck supple.      Comments: Right knee, no swelling, mild medial knee tenderness, NFROM   Lymphadenopathy:      Cervical: No cervical adenopathy.      Comments: LEs no edema    Skin:     Findings: No rash.   Neurological:      General: No focal deficit present.      Mental Status: She is alert.      Deep Tendon Reflexes:      Reflex Scores:       Patellar reflexes are 2+ on the right side and 2+ on the left side.  Psychiatric:         Mood and Affect: Mood and affect normal.         LABORATORY RESULTS:     EKG / Spirometry : -     Radiology: No results found.     ASSESSMENT/PLAN:   Assessment   Encounter Diagnoses   Name Primary?    Physical exam Yes    Encounter for screening mammogram  for malignant neoplasm of breast     Class 3 severe obesity due to excess calories with serious comorbidity and body mass index (BMI) of 45.0 to 49.9 in adult (HCC)        1. Physical exam  2. Encounter for screening mammogram for malignant neoplasm of breast    Assessment and Plan:     Eva Beasley Health checkup as follows:    LABORATORY & ORDERS:   Orders Placed This Encounter   Procedures    CBC With Differential With Platelet    Comp Metabolic Panel (14)    Hemoglobin A1C    Lipid Panel    TSH W Reflex To Free T4    Vitamin D    Urinalysis with Culture Reflex    INFLUENZA VACCINE, TRI, PRESERV FREE, 0.5 ML      REFERRALS: generated today : INFLUENZA VACCINE, TRI, PRESERV FREE, 0.5 ML  YULIA RANDALL 2D+3D SCREENING BILAT (CPT=77067/14252) .    IMMUNIZATIONS ordered and given today include: Flu vaccine.    RECOMMENDATIONS given include: ANTICIPATORY GUIDANCE  topics covered today include: safety (i.e. seat belts, helmets, sunscreen, protective sports gear ), nutrition (i.e. healthy meals and snacks (i.e. avoid junk food and high-carbohydrate foods); athletic conditioning, fluids; low fat milk, limit to less than 20 oz. a day; dental care with her dentist), and healthy habits & social competence & responsibilities: Recommendations on physical activity; exercise daily or at least 3 times a week for 30-60 minutes doing cardiovascular exercise. Patient educated on self breast examination to be done on a monthly basis.  Consider a  if over weight and/or having difficult in staying active. Attempt to keep a schedule that includes adequate sleep, and physical activities/exercise. Patient was educated on sexual transmitted disease. Best to abstain from sexual intercourse until she is ready to form a family. Use of condoms may prevent transmission of infections as well as pregnancy.   Contraception option chosen by patient was BCPs.  REFUSALS:  Although recommended, the patient refuses  the following: none .      FOLLOW-UP: Schedule a follow-up visit in 12 months.         3. Class 3 severe obesity due to excess calories with serious comorbidity and body mass index (BMI) of 45.0 to 49.9 in adult (HCC)      MEDICATIONS:  Mounjaro 15 mg Q week  Requested Prescriptions     Signed Prescriptions Disp Refills    Phentermine-Topiramate (QSYMIA) 3.75-23 MG Oral Capsule SR 24 Hr 14 capsule 0     Sig: Take 1 tablet by mouth daily.    Phentermine-Topiramate (QSYMIA) 7.5-46 MG Oral Capsule SR 24 Hr 30 capsule 1     Sig: Take 1 tablet by mouth daily.   RECOMMENDATIONS given include: Please, call our office with any questions or concerns. Notify Dr Weir or the [facility] if there is a development of any new medical condition. Stop medication immediatley if she believes or becomes pregnant. Also, inform the doctor with any new symptoms or medications' side effects. Patient was informed to follow a low carbohydrate, low calories diet and to maintain a  Cardiovascular exercise for 60 minutes 3-5 times a week. Consider a  if experience difficult keep track with exercise or staying at task.  Attempt to keep a schedule that includes an adequate sleep-work-physical exercise balance. Advised to increase water intake especially just before meals. Patient was educated that this will not be a temporary change, but a life time, life style change.    FOLLOW-UP: Schedule a follow-up visit in 2 months.             Orders This Visit:  Orders Placed This Encounter   Procedures    CBC With Differential With Platelet    Comp Metabolic Panel (14)    Hemoglobin A1C    Lipid Panel    TSH W Reflex To Free T4    Vitamin D    Urinalysis with Culture Reflex    INFLUENZA VACCINE, TRI, PRESERV FREE, 0.5 ML       Meds This Visit:  Requested Prescriptions     Signed Prescriptions Disp Refills    Phentermine-Topiramate (QSYMIA) 3.75-23 MG Oral Capsule SR 24 Hr 14 capsule 0     Sig: Take 1 tablet by mouth daily.     Phentermine-Topiramate (QSYMIA) 7.5-46 MG Oral Capsule SR 24 Hr 30 capsule 1     Sig: Take 1 tablet by mouth daily.       Imaging & Referrals:  INFLUENZA VACCINE, TRI, PRESERV FREE, 0.5 ML  YULIA RANDALL 2D+3D SCREENING BILAT (CPT=77067/73168)         BRANDEN HUNTER MD         [1]   Allergies  Allergen Reactions    Contrave [Naltrexone-Bupropion Hcl Er] DIZZINESS and OTHER (SEE COMMENTS)     Headaches     Victoza OTHER (SEE COMMENTS)     Abdominal pain

## 2024-10-20 RX ORDER — ATORVASTATIN CALCIUM 20 MG/1
20 TABLET, FILM COATED ORAL NIGHTLY
Qty: 90 TABLET | Refills: 3 | Status: SHIPPED | OUTPATIENT
Start: 2024-10-20

## 2024-10-21 ENCOUNTER — TELEPHONE (OUTPATIENT)
Dept: FAMILY MEDICINE CLINIC | Facility: CLINIC | Age: 49
End: 2024-10-21

## 2024-10-21 NOTE — TELEPHONE ENCOUNTER
Per pharmacy, plan does not cover medication      Phentermine-Topiramate (QSYMIA) 7.5-46 MG Oral Capsule SR 24 Hr, Take 1 tablet by mouth daily., Disp: 30 capsule, Rfl: 1

## 2024-10-22 NOTE — TELEPHONE ENCOUNTER
Language line Adelina ID #[id number] [place]    Left voicemail for patient to call back.

## 2024-10-23 NOTE — TELEPHONE ENCOUNTER
Using language line : Tayler ID number [id number]    Left message #2.   Mychart message sent as well.

## 2024-10-23 NOTE — TELEPHONE ENCOUNTER
Language Line [place], [place] [id number], [place]  Patient called back, verified name/ by PSR.  Gave message from Dr Weir, medication not covered.  She can pay for it or call insurance to see if any similar medications are covered.  Patient stated she will discuss medication at her follow up appointment.    Future Appointments   Date Time Provider Department Center   2024 11:00 AM [facility]

## 2024-10-25 ENCOUNTER — OFFICE VISIT (OUTPATIENT)
Dept: FAMILY MEDICINE CLINIC | Facility: CLINIC | Age: 49
End: 2024-10-25

## 2024-10-25 VITALS
HEART RATE: 89 BPM | BODY MASS INDEX: 38.67 KG/M2 | SYSTOLIC BLOOD PRESSURE: 128 MMHG | HEIGHT: 64 IN | DIASTOLIC BLOOD PRESSURE: 75 MMHG | WEIGHT: 226.5 LBS

## 2024-10-25 DIAGNOSIS — M25.561 ACUTE PAIN OF RIGHT KNEE: Primary | ICD-10-CM

## 2024-10-25 PROCEDURE — 99213 OFFICE O/P EST LOW 20 MIN: CPT | Performed by: FAMILY MEDICINE

## 2024-10-25 PROCEDURE — 20610 DRAIN/INJ JOINT/BURSA W/O US: CPT | Performed by: FAMILY MEDICINE

## 2024-10-25 RX ORDER — TRAMADOL HYDROCHLORIDE 50 MG/1
50 TABLET ORAL EVERY 8 HOURS PRN
Qty: 40 TABLET | Refills: 0 | Status: SHIPPED | OUTPATIENT
Start: 2024-10-25

## 2024-10-25 NOTE — PROGRESS NOTES
10/25/2024  11:11 AM    Eva Beasley is a 49 year old female.    Chief complaint(s):   Chief Complaint   Patient presents with    Knee Pain     Right knee pain.     HPI:     Eva Beasley primary complaint is regarding right knee pain.     Patient is a 49-year-old female who presents with a complaint of right knee pain which she is has had for the past 2 to 3 weeks.  Patient received an intra-articular injection of Depo-Medrol about 10 days ago.  Reports that the pain is not getting any better reports some swelling and is mostly located on the medial aspect of the knee.  There has been no recent trauma accidents or injuries.  Also denies any redness, fever.      HISTORY:  Past Medical History:    Hypothyroidism    Polycystic ovarian disease    Rheumatic fever      Past Surgical History:   Procedure Laterality Date    Appendectomy      Colonoscopy N/A 10/28/2023    Procedure: COLONOSCOPY;  Surgeon: Ollie Whalen MD;  Location: [facility] ENDOSCOPY      Family History   Problem Relation Age of Onset    Circulatory Problems Mother         Peripheral vascular disease    Cancer Paternal Grandfather         Liver cancer     Alcohol and Other Disorders Associated Paternal Grandfather         Alcoholism    Diabetes Paternal Grandmother         Type 2      Social History:   Social History     Socioeconomic History    Marital status:    Tobacco Use    Smoking status: Never     Passive exposure: Never    Smokeless tobacco: Never   Vaping Use    Vaping status: Never Used   Substance and Sexual Activity    Alcohol use: No     Alcohol/week: 0.0 standard drinks of alcohol    Drug use: No     Comment: No history of illicit substance abuse        Immunizations:   Immunization History   Administered Date(s) Administered    FLULAVAL 6 months & older 0.5 ml Prefilled syringe (86878) 12/04/2017, 11/02/2018, 10/22/2019, 10/23/2021, 01/14/2023    FLUZONE 6 months and older PFS  0.5 ml (65680) 10/01/2016, 12/04/2017, 11/02/2018, 12/16/2023    Flulaval, 3 Years & >, IM 10/16/2007, 09/28/2010    Fluvirin, 3 Years & >, Im 12/13/2005, 11/02/2006, 11/06/2012    Influenza 11/11/2008, 10/10/2009, 09/23/2014, 09/16/2015    Influenza Vaccine, trivalent (IIV3), PF 0.5mL (08681) 10/19/2024    Influenza Virus Vaccine, H1N1 12/19/2009    TD 02/29/2004    TDAP 07/25/2013, 01/14/2023       Medications (Active prior to today's visit):  Current Outpatient Medications   Medication Sig Dispense Refill    traMADol 50 MG Oral Tab Take 1 tablet (50 mg total) by mouth every 8 (eight) hours as needed for Pain. 40 tablet 0    atorvastatin 20 MG Oral Tab Take 1 tablet (20 mg total) by mouth nightly. 90 tablet 3    ibuprofen 600 MG Oral Tab Take 1 tablet (600 mg total) by mouth every 6 (six) hours as needed for Pain. Always take it with food. 60 tablet 0    gabapentin 100 MG Oral Cap Take 1 capsule (100 mg total) by mouth 3 (three) times daily. 90 capsule 1    Norethin-Eth Estrad Triphasic (DASETTA 7/7/7) 0.5/0.75/1-35 MG-MCG Oral Tab Take 1 tablet by mouth daily. 84 tablet 3    Diclofenac-miSOPROStol 50-0.2 MG Oral Tab EC Take 1 tablet by mouth 2 (two) times daily with meals. 180 tablet 1    levothyroxine 137 MCG Oral Tab Take 137 mcg by mouth before breakfast. 90 tablet 3    escitalopram 10 MG Oral Tab Take 1 tablet (10 mg total) by mouth every morning. 90 tablet 3    Continuous Glucose Sensor (FREESTYLE DEVEN 3 SENSOR) Does not apply Misc USE AS DIRECTED 6 each 1    Tirzepatide (MOUNJARO) 15 MG/0.5ML Subcutaneous Solution Pen-injector Inject 15 mg into the skin once a week. 2 mL 4    ergocalciferol 1.25 MG (68662 UT) Oral Cap Take 1 capsule (50,000 Units total) by mouth once a week. 12 capsule 0    Loratadine-Pseudoephedrine (EQL ALLERGY/CONGESTION RELIEF OR) Take 1 tablet by mouth Q12H.      pantoprazole 40 MG Oral Tab EC Take 1 tablet (40 mg total) by mouth daily. 30 tablet 3    fluticasone propionate 50 MCG/ACT  Nasal Suspension 1 spray by Nasal route 2 (two) times daily. 16 g 3    simethicone 125 MG Oral Chew Tab Chew 2 tablets (250 mg total) by mouth every 6 (six) hours as needed for FLATULENCE. 60 tablet 1    Phentermine-Topiramate (QSYMIA) 3.75-23 MG Oral Capsule SR 24 Hr Take 1 tablet by mouth daily. (Patient not taking: Reported on 10/25/2024) 14 capsule 0    Phentermine-Topiramate (QSYMIA) 7.5-46 MG Oral Capsule SR 24 Hr Take 1 tablet by mouth daily. (Patient not taking: Reported on 10/25/2024) 30 capsule 1       Allergies:  Allergies[1]      ROS:   Review of Systems   Constitutional:  Negative for appetite change, diaphoresis, fatigue and fever.   HENT:  Negative for hearing loss and nosebleeds.    Eyes:  Negative for visual disturbance.   Respiratory:  Negative for shortness of breath.    Cardiovascular:  Negative for chest pain and palpitations.   Gastrointestinal:  Negative for abdominal pain.   Endocrine: Negative for polydipsia and polyuria.   Genitourinary:  Negative for hematuria and menstrual problem.   Musculoskeletal:  Negative for arthralgias.        Tight knee pain   Skin:  Negative for rash.   Neurological:  Negative for dizziness and headaches.   Psychiatric/Behavioral:  Negative for dysphoric mood and sleep disturbance.        PHYSICAL EXAM:   VS: /75 (BP Location: Right arm, Patient Position: Sitting, Cuff Size: large)   Pulse 89   Ht 5' 4\" (1.626 m)   Wt 226 lb 8 oz (102.7 kg)   LMP 09/29/2024 (Approximate)   BMI 38.88 kg/m²     Physical Exam  Vitals reviewed.   Constitutional:       General: She is not in acute distress.     Appearance: Normal appearance.   HENT:      Head: Normocephalic.   Eyes:      Conjunctiva/sclera: Conjunctivae normal.   Cardiovascular:      Rate and Rhythm: Normal rate.   Pulmonary:      Effort: Pulmonary effort is normal.   Musculoskeletal:      Cervical back: Neck supple.      Comments: Right knee + swelling, decrease flexion, + medical tenderness   Skin:      Findings: No rash.   Psychiatric:         Mood and Affect: Mood normal.       Procedure:  /75 (BP Location: Right arm, Patient Position: Sitting, Cuff Size: large)   Pulse 89   Ht 5' 4\" (1.626 m)   Wt 226 lb 8 oz (102.7 kg)   LMP 09/29/2024 (Approximate)   BMI 38.88 kg/m²     Diagnosis/Indication: right knee pain .   Procedure Note:  Intra-articular and possible arthrocentesis injection   Injection involves the right knee.  Written informed consent was obtained.  The site was prepped with alcohol and betadine.  The site was anesthetized with 1 cc of 1% lidocaine.  The joint was injected with 2.5 cc of 1% lidocaine w/out epi.  The needle was carefully introduced into the joint space. The patient experienced moderate discomfort during the procedure.  No complications.  Estimated blood loss: none.    Did not withdraw any fluid or blood  FOLLOW UP; RTC appointment given as needed and/or keep present appointment.      Carter Weir MD    LABORATORY RESULTS:     EKG / Spirometry : -     Radiology: No results found.     ASSESSMENT/PLAN:   Assessment   Encounter Diagnosis   Name Primary?    Acute pain of right knee Yes       MEDICATIONS:     Requested Prescriptions     Signed Prescriptions Disp Refills    traMADol 50 MG Oral Tab 40 tablet 0     Sig: Take 1 tablet (50 mg total) by mouth every 8 (eight) hours as needed for Pain.     REFERRALS: MRI KNEE, RIGHT (OMI=[id number]),       Procedures    MRI KNEE, RIGHT (GMK=[id number])        RECOMMENDATIONS given include: Patient was reassured of  her medical condition and all questions and concerns were answered. Patient was informed to please, call our office with any new or further questions or concerns that may come up in the near future. Notify Dr Weir or the [facility] if there is a deterioration or worsening of the medical condition. Also, inform the doctor with any new symptoms or medications' side effects.  RICE therapy    FOLLOW-UP: Schedule a follow-up  visit in 10 days.            Orders This Visit:  No orders of the defined types were placed in this encounter.      Meds This Visit:  Requested Prescriptions     Signed Prescriptions Disp Refills    traMADol 50 MG Oral Tab 40 tablet 0     Sig: Take 1 tablet (50 mg total) by mouth every 8 (eight) hours as needed for Pain.       Imaging & Referrals:  MRI KNEE, RIGHT (KXE=[id number])         BRANDEN HUNTER MD       [1]   Allergies  Allergen Reactions    Contrave [Naltrexone-Bupropion Hcl Er] DIZZINESS and OTHER (SEE COMMENTS)     Headaches     Victoza OTHER (SEE COMMENTS)     Abdominal pain

## 2024-11-02 DIAGNOSIS — M25.561 ACUTE PAIN OF RIGHT KNEE: ICD-10-CM

## 2024-11-02 RX ORDER — TRAMADOL HYDROCHLORIDE 50 MG/1
50 TABLET ORAL EVERY 8 HOURS PRN
Qty: 40 TABLET | Refills: 0 | Status: SHIPPED | OUTPATIENT
Start: 2024-11-02

## 2024-11-06 ENCOUNTER — TELEPHONE (OUTPATIENT)
Dept: CASE MANAGEMENT | Age: 49
End: 2024-11-06

## 2024-11-06 NOTE — TELEPHONE ENCOUNTER
Called [facility] regarding pending authorization. Spoke to intake rep Ms. Walls. Rep stated additional information is required to authorize MRI KNEE, RIGHT (IPH=[id number]). The clinical notes sent did not support this need for testing. 6 weeks of provider directed treatment (ie: physical therapy) is required. If no additional information is received by 11/15/2024 this request will be denied.     Please call [facility] in regards to this request. [phone] case reference #[id number].    Patient is scheduled 11/10/2024. Please advise.

## 2024-11-09 RX ORDER — ACYCLOVIR 800 MG/1
1 TABLET ORAL
Qty: 6 EACH | Refills: 1 | Status: SHIPPED | OUTPATIENT
Start: 2024-11-09

## 2024-11-09 NOTE — TELEPHONE ENCOUNTER
Endocrine Refill protocol for CGM supplies     Protocol Criteria:  PASSED     If below requirement is met, send a 90-day supply with 1 refill per provider protocol.     Verify appointment with Endocrinology completed in the last 12 months or scheduled in the next 6 months     Last completed office visit:4/17/2024 Arleen Paris APRN     Next scheduled Follow up: No appointment scheduled - Called and spoke to patient, appointment scheduled for first available, 1/24/25

## 2024-11-10 ENCOUNTER — HOSPITAL ENCOUNTER (OUTPATIENT)
Dept: MRI IMAGING | Facility: HOSPITAL | Age: 49
End: 2024-11-10
Attending: FAMILY MEDICINE
Payer: COMMERCIAL

## 2024-11-10 ENCOUNTER — HOSPITAL ENCOUNTER (OUTPATIENT)
Dept: MRI IMAGING | Facility: HOSPITAL | Age: 49
Discharge: HOME | End: 2024-11-10
Attending: FAMILY MEDICINE
Payer: COMMERCIAL

## 2024-11-10 DIAGNOSIS — M25.561 ACUTE PAIN OF RIGHT KNEE: ICD-10-CM

## 2024-11-10 PROCEDURE — 73721 MRI JNT OF LWR EXTRE W/O DYE: CPT | Performed by: FAMILY MEDICINE

## 2024-11-16 ENCOUNTER — OFFICE VISIT (OUTPATIENT)
Dept: FAMILY MEDICINE CLINIC | Facility: CLINIC | Age: 49
End: 2024-11-16

## 2024-11-16 VITALS
HEART RATE: 80 BPM | DIASTOLIC BLOOD PRESSURE: 71 MMHG | SYSTOLIC BLOOD PRESSURE: 106 MMHG | HEIGHT: 64 IN | WEIGHT: 222 LBS | BODY MASS INDEX: 37.9 KG/M2

## 2024-11-16 DIAGNOSIS — N92.6 IRREGULAR MENSTRUAL CYCLE: ICD-10-CM

## 2024-11-16 DIAGNOSIS — S83.241D ACUTE MEDIAL MENISCUS TEAR OF RIGHT KNEE, SUBSEQUENT ENCOUNTER: Primary | ICD-10-CM

## 2024-11-16 PROCEDURE — 99213 OFFICE O/P EST LOW 20 MIN: CPT | Performed by: FAMILY MEDICINE

## 2024-11-16 RX ORDER — DOXYCYCLINE HYCLATE 100 MG
100 TABLET ORAL 2 TIMES DAILY
Qty: 42 TABLET | Refills: 0 | Status: SHIPPED | OUTPATIENT
Start: 2024-11-16 | End: 2024-12-07

## 2024-11-16 RX ORDER — MONTELUKAST SODIUM 10 MG/1
10 TABLET ORAL NIGHTLY
COMMUNITY
End: 2024-11-16

## 2024-11-16 RX ORDER — ERGOCALCIFEROL 1.25 MG/1
50000 CAPSULE, LIQUID FILLED ORAL WEEKLY
Qty: 12 CAPSULE | Refills: 0 | Status: SHIPPED | OUTPATIENT
Start: 2024-11-16

## 2024-11-16 RX ORDER — MONTELUKAST SODIUM 10 MG/1
10 TABLET ORAL NIGHTLY
Qty: 90 TABLET | Refills: 2 | Status: SHIPPED | OUTPATIENT
Start: 2024-11-16

## 2024-11-16 NOTE — PROGRESS NOTES
11/16/2024  11:22 AM    Eva Beasley is a 49 year old female.    Chief complaint(s):   Chief Complaint   Patient presents with    Test Results     Discuss MRI results     Menstrual Problem     Menstrual started on 11/01 but has not stopped      HPI:     Eva Beasley primary complaint is regarding as above.     Patient is a 49-year-old female who is following up regarding her right knee pain.  She recently had an MRI which confirmed to have a meniscal tear of the right knee.  She is feeling better she been taking her ibuprofen.  There is no recent swelling and pain comes and goes.  But overall it is improving.  Will refer her to an orthopedic for further recommendations possibly arthroscopy about she is improving so with recommend continue present management.    In addition she is complaining of menorrhagia.  She has been seeing gynecologist who put her on birth control pills and equivalent to Ortho-Novum 7/7/7.  She has been light spotting for the past 2 weeks.  There has been no fever, pelvic pain.    HISTORY:  Past Medical History:    Hypothyroidism    Polycystic ovarian disease    Rheumatic fever      Past Surgical History:   Procedure Laterality Date    Appendectomy      Colonoscopy N/A 10/28/2023    Procedure: COLONOSCOPY;  Surgeon: Ollie Whalen MD;  Location: [facility] ENDOSCOPY      Family History   Problem Relation Age of Onset    Circulatory Problems Mother         Peripheral vascular disease    Cancer Paternal Grandfather         Liver cancer     Alcohol and Other Disorders Associated Paternal Grandfather         Alcoholism    Diabetes Paternal Grandmother         Type 2      Social History:   Social History     Socioeconomic History    Marital status:    Tobacco Use    Smoking status: Never     Passive exposure: Never    Smokeless tobacco: Never   Vaping Use    Vaping status: Never Used   Substance and Sexual Activity    Alcohol use: No      Alcohol/week: 0.0 standard drinks of alcohol    Drug use: No     Comment: No history of illicit substance abuse        Immunizations:   Immunization History   Administered Date(s) Administered    FLULAVAL 6 months & older 0.5 ml Prefilled syringe (63010) 12/04/2017, 11/02/2018, 10/22/2019, 10/23/2021, 01/14/2023    FLUZONE 6 months and older PFS 0.5 ml (46257) 10/01/2016, 12/04/2017, 11/02/2018, 12/16/2023    Flulaval, 3 Years & >, IM 10/16/2007, 09/28/2010    Fluvirin, 3 Years & >, Im 12/13/2005, 11/02/2006, 11/06/2012    Influenza 11/11/2008, 10/10/2009, 09/23/2014, 09/16/2015    Influenza Vaccine, trivalent (IIV3), PF 0.5mL (65042) 10/19/2024    Influenza Virus Vaccine, H1N1 12/19/2009    TD 02/29/2004    TDAP 07/25/2013, 01/14/2023       Medications (Active prior to today's visit):  Current Outpatient Medications   Medication Sig Dispense Refill    ergocalciferol 1.25 MG (60540 UT) Oral Cap Take 1 capsule (50,000 Units total) by mouth once a week. 12 capsule 0    montelukast 10 MG Oral Tab Take 1 tablet (10 mg total) by mouth nightly. 90 tablet 2    Doxycycline Hyclate 100 MG Oral Tab Take 1 tablet (100 mg total) by mouth 2 (two) times daily for 21 days. 42 tablet 0    Continuous Glucose Sensor (FREESTYLE DEVEN 3 SENSOR) Does not apply Misc Take 1 each by mouth every 14 (fourteen) days. 6 each 1    traMADol 50 MG Oral Tab Take 1 tablet (50 mg total) by mouth every 8 (eight) hours as needed for Pain. 40 tablet 0    atorvastatin 20 MG Oral Tab Take 1 tablet (20 mg total) by mouth nightly. 90 tablet 3    ibuprofen 600 MG Oral Tab Take 1 tablet (600 mg total) by mouth every 6 (six) hours as needed for Pain. Always take it with food. 60 tablet 0    gabapentin 100 MG Oral Cap Take 1 capsule (100 mg total) by mouth 3 (three) times daily. 90 capsule 1    Norethin-Eth Estrad Triphasic (DASETTA 7/7/7) 0.5/0.75/1-35 MG-MCG Oral Tab Take 1 tablet by mouth daily. 84 tablet 3    Diclofenac-miSOPROStol 50-0.2 MG Oral Tab EC  Take 1 tablet by mouth 2 (two) times daily with meals. 180 tablet 1    levothyroxine 137 MCG Oral Tab Take 137 mcg by mouth before breakfast. 90 tablet 3    escitalopram 10 MG Oral Tab Take 1 tablet (10 mg total) by mouth every morning. 90 tablet 3    Tirzepatide (MOUNJARO) 15 MG/0.5ML Subcutaneous Solution Pen-injector Inject 15 mg into the skin once a week. 2 mL 4    Loratadine-Pseudoephedrine (EQL ALLERGY/CONGESTION RELIEF OR) Take 1 tablet by mouth Q12H.      pantoprazole 40 MG Oral Tab EC Take 1 tablet (40 mg total) by mouth daily. 30 tablet 3    fluticasone propionate 50 MCG/ACT Nasal Suspension 1 spray by Nasal route 2 (two) times daily. 16 g 3    simethicone 125 MG Oral Chew Tab Chew 2 tablets (250 mg total) by mouth every 6 (six) hours as needed for FLATULENCE. 60 tablet 1       Allergies:  Allergies[1]      ROS:   Review of Systems   Constitutional:  Negative for appetite change and fever.   Eyes:  Negative for visual disturbance.   Respiratory:  Negative for shortness of breath.    Cardiovascular:  Negative for chest pain.   Gastrointestinal:  Negative for abdominal pain, nausea and vomiting.   Genitourinary:  Positive for menstrual problem.   Musculoskeletal:  Negative for back pain.        Right knee pain   Skin:  Negative for rash.   Neurological:  Negative for dizziness and headaches.       PHYSICAL EXAM:   VS: /71 (BP Location: Right arm, Patient Position: Sitting, Cuff Size: adult)   Pulse 80   Ht 5' 4\" (1.626 m)   Wt 222 lb (100.7 kg)   LMP 11/01/2024 (Approximate)   BMI 38.11 kg/m²     Physical Exam  Vitals reviewed.   Constitutional:       General: She is not in acute distress.     Appearance: Normal appearance.   HENT:      Head: Normocephalic.   Eyes:      Conjunctiva/sclera: Conjunctivae normal.   Cardiovascular:      Rate and Rhythm: Normal rate.   Pulmonary:      Effort: Pulmonary effort is normal.   Musculoskeletal:      Cervical back: Neck supple.   Skin:     Findings: No rash.    Psychiatric:         Mood and Affect: Mood normal.         LABORATORY RESULTS:   No results found for: \"URCOLOR\", \"URCLA\", \"URINELEUK\", \"URINENITRITE\", \"URINEBLOOD\"   Results for orders placed or performed in visit on 10/19/24   Vitamin D, 25-Hydroxy    Collection Time: 10/19/24 10:13 AM   Result Value Ref Range    Vitamin D, 25OH, Total 51.2 30.0 - 100.0 ng/mL     EKG / Spirometry : -     Radiology: MRI KNEE, RIGHT (KKH=24869)    Result Date: 11/11/2024  PROCEDURE: MRI KNEE, RIGHT (CPT=73721)  COMPARISON: None.  INDICATIONS: M25.561 Acute pain of right knee  TECHNIQUE: A complete multi-planar, multisequence MRI of the knee was performed.   FINDINGS:    MEDIAL MENISCUS: Abnormal signal and morphology is seen along the posterior horn and body which does not follow a specific type of tear pattern suggestive of a complex degenerative tear.  Complete radial tear seen at the meniscal body.  There is medial extrusion of the body of the medial meniscus by approximately 4-5 mm.  LATERAL MENISCUS: Degenerative changes of the posterior root without discrete tear.  ACL: Intact PCL: Intact MCL: Mild chronic thickening of the MCL without tear. LCL COMPLEX: Intact  PATELLOFEMORAL EXTENSOR MECHANISM: Normal.   ARTICULAR CARTILAGE: Extensive full-thickness chondral loss seen throughout the medial compartment with subchondral cystic change. There is greater than 50% thinning of the articular cartilage throughout the lateral compartment with areas of full-thickness chondral fissuring. Extensive full-thickness chondral loss seen throughout the medial patellar facet and patellar apex as well as involving the medial trochlea with subchondral cystic change.  BONE MARROW: There is mild amount of increased marrow edema is seen along the medial femoral condyle and medial tibial plateau with subtle irregularity of the articular surfaces suggestive of stress changes.. There are tricompartmental osteophytes. Remainder of the osseous  structures are otherwise intact without acute fracture, dislocation, or marrow replacing lesion.  JOINT FLUID: Small joint effusion with synovitis.  OTHER:  No mass or loculated collection. The muscles have a normal signal intensity and bulk.         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  Complex degenerative tears involving the posterior horn and body of the medial meniscus with a complete radial tear at the body.  Stress changes within the medial compartment without acute fracture or dislocation.    Dictated by (CST): Romeo Millard MD on 11/11/2024 at 3:21 PM     Finalized by (CST): Romeo Millard MD on 11/11/2024 at 3:25 PM             ASSESSMENT/PLAN:   Assessment   Encounter Diagnoses   Name Primary?    Acute medial meniscus tear of right knee, subsequent encounter Yes    Irregular menstrual cycle        1. Acute medial meniscus tear of right knee, subsequent encounter    MEDICATIONS:   CPM          REFERRALS: ORTHOPEDIC - INTERNAL,       Procedures    ORTHOPEDIC - INTERNAL        RECOMMENDATIONS given include: Patient was reassured of  her medical condition and all questions and concerns were answered. Patient was informed to please, call our office with any new or further questions or concerns that may come up in the near future. Notify Dr Weir or the [facility] if there is a deterioration or worsening of the medical condition. Also, inform the doctor with any new symptoms or medications' side effects.      FOLLOW-UP: Schedule a follow-up visit in  prn/ [facility].         2. Irregular menstrual cycle    MEDICATIONS:   BCPs +   Doxycycline Hyclate 100 MG Oral Tab 42 tablet 0     Sig: Take 1 tablet (100 mg total) by mouth 2 (two) times daily for 21 days.        RECOMMENDATIONS given include: Patient was reassured of  her medical condition and all questions and concerns were answered. Patient was informed to please, call our office with any new or further questions or  concerns that may come up in the near future. Notify Dr Hunter or the [facility] if there is a deterioration or worsening of the medical condition. Also, inform the doctor with any new symptoms or medications' side effects.      FOLLOW-UP: Schedule a follow-up visit in 4 -6 weeks.             Orders This Visit:  No orders of the defined types were placed in this encounter.      Meds This Visit:  Requested Prescriptions     Signed Prescriptions Disp Refills    ergocalciferol 1.25 MG (66194 UT) Oral Cap 12 capsule 0     Sig: Take 1 capsule (50,000 Units total) by mouth once a week.    montelukast 10 MG Oral Tab 90 tablet 2     Sig: Take 1 tablet (10 mg total) by mouth nightly.    Doxycycline Hyclate 100 MG Oral Tab 42 tablet 0     Sig: Take 1 tablet (100 mg total) by mouth 2 (two) times daily for 21 days.       Imaging & Referrals:  ORTHOPEDIC - INTERNAL         BRANDEN HUNTER MD         [1]   Allergies  Allergen Reactions    Contrave [Naltrexone-Bupropion Hcl Er] DIZZINESS and OTHER (SEE COMMENTS)     Headaches     Victoza OTHER (SEE COMMENTS)     Abdominal pain

## 2024-12-06 RX ORDER — DOXYCYCLINE HYCLATE 100 MG
100 TABLET ORAL 2 TIMES DAILY
Qty: 42 TABLET | Refills: 0 | OUTPATIENT
Start: 2024-12-06 | End: 2024-12-27

## 2024-12-12 ENCOUNTER — HOSPITAL ENCOUNTER (OUTPATIENT)
Dept: GENERAL RADIOLOGY | Facility: HOSPITAL | Age: 49
Discharge: HOME | End: 2024-12-12
Attending: ORTHOPAEDIC SURGERY
Payer: COMMERCIAL

## 2024-12-12 ENCOUNTER — OFFICE VISIT (OUTPATIENT)
Dept: ORTHOPEDICS CLINIC | Facility: CLINIC | Age: 49
End: 2024-12-12

## 2024-12-12 VITALS — WEIGHT: 227 LBS | HEIGHT: 66 IN | BODY MASS INDEX: 36.48 KG/M2

## 2024-12-12 DIAGNOSIS — M25.561 RIGHT KNEE PAIN, UNSPECIFIED CHRONICITY: ICD-10-CM

## 2024-12-12 DIAGNOSIS — M17.11 PRIMARY OSTEOARTHRITIS OF RIGHT KNEE: Primary | ICD-10-CM

## 2024-12-12 PROCEDURE — 73564 X-RAY EXAM KNEE 4 OR MORE: CPT | Performed by: ORTHOPAEDIC SURGERY

## 2024-12-12 NOTE — PROGRESS NOTES
NURSING INTAKE COMMENTS:   Chief Complaint   Patient presents with    Knee Pain     Consult - right knee - onset 10/23/2024 - patient stated that she was walking when she felt a pop in her knee - she was having a hard time walking - pain rated 6-7/10 with movement - MRI in system        HPI: This 49 year old female presents today with complaints of right knee pain.  She has had pain in the right knee for a number of years.  In mid October October 23 she was walking towards her car and twisted and felt a crack and increased pain along the medial knee.  She has had ongoing pain mainly over the medial knee since that time.  She had a cortisone injection shortly after the injury and the seem to help significantly.  She has mild to moderate discomfort.  She has no significant swelling no mechanical clicking or locking.  She does have occasional stabbing pain along the medial knee.  She had no prior surgery on the knee but does take diclofenac on a regular basis.  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  She has been using a soft support on the knee.    Past Medical History:    Hypothyroidism    Polycystic ovarian disease    Rheumatic fever     Past Surgical History:   Procedure Laterality Date    Appendectomy      Colonoscopy N/A 10/28/2023    Procedure: COLONOSCOPY;  Surgeon: Ollie Whalen MD;  Location: [facility] ENDOSCOPY     Current Outpatient Medications   Medication Sig Dispense Refill    ergocalciferol 1.25 MG (16554 UT) Oral Cap Take 1 capsule (50,000 Units total) by mouth once a week. 12 capsule 0    montelukast 10 MG Oral Tab Take 1 tablet (10 mg total) by mouth nightly. 90 tablet 2    Continuous Glucose Sensor (FREESTYLE DEVEN 3 SENSOR) Does not apply Misc Take 1 each by mouth every 14 (fourteen) days. 6 each 1    traMADol 50 MG Oral Tab Take 1 tablet (50 mg total) by mouth every 8 (eight) hours as needed for Pain. 40 tablet 0    atorvastatin 20 MG Oral Tab Take 1 tablet (20 mg total) by mouth nightly. 90  tablet 3    ibuprofen 600 MG Oral Tab Take 1 tablet (600 mg total) by mouth every 6 (six) hours as needed for Pain. Always take it with food. 60 tablet 0    gabapentin 100 MG Oral Cap Take 1 capsule (100 mg total) by mouth 3 (three) times daily. 90 capsule 1    Norethin-Eth Estrad Triphasic (DASETTA 7/7/7) 0.5/0.75/1-35 MG-MCG Oral Tab Take 1 tablet by mouth daily. 84 tablet 3    Diclofenac-miSOPROStol 50-0.2 MG Oral Tab EC Take 1 tablet by mouth 2 (two) times daily with meals. 180 tablet 1    levothyroxine 137 MCG Oral Tab Take 137 mcg by mouth before breakfast. 90 tablet 3    escitalopram 10 MG Oral Tab Take 1 tablet (10 mg total) by mouth every morning. 90 tablet 3    Tirzepatide (MOUNJARO) 15 MG/0.5ML Subcutaneous Solution Pen-injector Inject 15 mg into the skin once a week. 2 mL 4    Loratadine-Pseudoephedrine (EQL ALLERGY/CONGESTION RELIEF OR) Take 1 tablet by mouth Q12H.      pantoprazole 40 MG Oral Tab EC Take 1 tablet (40 mg total) by mouth daily. 30 tablet 3    fluticasone propionate 50 MCG/ACT Nasal Suspension 1 spray by Nasal route 2 (two) times daily. 16 g 3    simethicone 125 MG Oral Chew Tab Chew 2 tablets (250 mg total) by mouth every 6 (six) hours as needed for FLATULENCE. 60 tablet 1     Allergies[1]  Family History   Problem Relation Age of Onset    Circulatory Problems Mother         Peripheral vascular disease    Cancer Paternal Grandfather         Liver cancer     Alcohol and Other Disorders Associated Paternal Grandfather         Alcoholism    Diabetes Paternal Grandmother         Type 2       Social History     Occupational History    Not on file   Tobacco Use    Smoking status: Never     Passive exposure: Never    Smokeless tobacco: Never   Vaping Use    Vaping status: Never Used   Substance and Sexual Activity    Alcohol use: No     Alcohol/week: 0.0 standard drinks of alcohol    Drug use: No     Comment: No history of illicit substance abuse    Sexual activity: Not on file        Review of  Systems:  GENERAL: denies fevers, chills, night sweats, fatigue, unintentional weight loss/gain  SKIN: denies skin lesions, open sores, rash  HEENT:denies recent vision change, new nasal congestion,hearing loss, tinnitus, sore throat, headaches  RESPIRATORY: denies new shortness of breath, cough, asthma, wheezing  CARDIOVASCULAR: denies chest pain, leg cramps with exertion, palpitations, leg swelling  GI: denies abdominal pain, nausea, vomiting, diarrhea, constipation, hematochezia, worsening heartburn or stomach ulcers  : denies dysuria, hematuria, incontinence, increased frequency, urgency, difficulty urinating  MUSCULOSKELETAL: denies musculoskeletal complaints other than in HPI  NEURO: denies numbness, tingling, weakness, balance issues, dizziness, memory loss  PSYCHIATRIC: denies Hx of depression, anxiety, other psychiatric disorders  HEMATOLOGIC: denies blood clots, anemia, blood clotting disorders, blood transfusion  ENDOCRINE: denies autoimmune disease, thyroid issues, or diabetes  ALLERGY: denies asthma, seasonal allergies    Physical Examination:    Ht 5' 6\" (1.676 m)   Wt 227 lb (103 kg)   LMP 11/01/2024 (Approximate)   BMI 36.64 kg/m²   Constitutional: appears well hydrated, alert and responsive, no acute distress noted  Extremities: She walks without a limp.  Further exam of the right knee reveals a subtle varus deformity.  There is no effusion.  She is tender at the mid medial joint line.  Lachman sign and posterior drawer negative.  Knee is stable varus valgus stress at 30 degrees and full extension.  No pain with passive range of motion of the hip.  No calf tenderness or swelling  Neurological: Light touch and pinprick sensation intact throughout the lower extremities.  Ankle dorsiflexion plantarflexion EHL knee extension and hip flexion strength are 5 out of 5 bilaterally.  No clonus.    Imaging:   X-rays of the right knee show moderate degenerative changes involving the medial  compartment.    MRI right knee and report were reviewed.  The MRI shows degenerative changes of the medial compartment with significant articular cartilage damage and degenerative changes and tearing of the medial meniscus.    Labs:  Lab Results   Component Value Date    WBC 6.4 10/19/2024    HGB 12.6 10/19/2024    .0 10/19/2024      Lab Results   Component Value Date    GLU 82 10/19/2024    BUN 16 10/19/2024    CREATSERUM 0.69 10/19/2024    GFRNAA 113 10/23/2021    GFRAA 130 10/23/2021        Assessment and Plan:  Diagnoses and all orders for this visit:    Primary osteoarthritis of right knee  -     PHYSICAL THERAPY - INTERNAL  -     [facility] PROC FOR MANAGED CARE AUTH    Right knee pain, unspecified chronicity  -     Cancel: XR KNEE (3 VIEWS), RIGHT (CPT=73562); Future        Assessment: Right knee osteoarthritis, primary, medial compartment    Plan: I discussed operative and nonoperative treatment options for the knee.  I do not feel she would likely benefit from any type of arthroscopic procedure.  I think she may be better served with a reconstructive type surgical option.  She would like to continue with nonoperative care for now.  I provided referral for outpatient physical therapy.  I discussed activity modifications, home exercises, icing, oral anti-inflammatories.  Will attempt to authorize a Durolane injection for the knee.  Follow-up again as needed.    Follow Up: Return if symptoms worsen or fail to improve.    SARAH FLETCHER MD       [1]   Allergies  Allergen Reactions    Contrave [Naltrexone-Bupropion Hcl Er] DIZZINESS and OTHER (SEE COMMENTS)     Headaches     Victoza OTHER (SEE COMMENTS)     Abdominal pain

## 2024-12-16 ENCOUNTER — TELEPHONE (OUTPATIENT)
Dept: ORTHOPEDICS CLINIC | Facility: CLINIC | Age: 49
End: 2024-12-16

## 2024-12-16 NOTE — TELEPHONE ENCOUNTER
Verified coverage, no authorization is needed for Durolane injection.  Reference number [id number]

## 2024-12-23 ENCOUNTER — OFFICE VISIT (OUTPATIENT)
Dept: FAMILY MEDICINE CLINIC | Facility: CLINIC | Age: 49
End: 2024-12-23

## 2024-12-23 VITALS
BODY MASS INDEX: 37.28 KG/M2 | HEIGHT: 66 IN | HEART RATE: 76 BPM | DIASTOLIC BLOOD PRESSURE: 72 MMHG | SYSTOLIC BLOOD PRESSURE: 115 MMHG | WEIGHT: 232 LBS

## 2024-12-23 DIAGNOSIS — M94.0 COSTOCHONDRITIS: ICD-10-CM

## 2024-12-23 DIAGNOSIS — N92.6 IRREGULAR MENSTRUAL CYCLE: Primary | ICD-10-CM

## 2024-12-23 LAB
CUVETTE EXPIRATION DATE: ABNORMAL DATE
CUVETTE LOT #: ABNORMAL NUMERIC
HEMOGLOBIN: 11.6 G/DL (ref 13–17)

## 2024-12-23 PROCEDURE — 85018 HEMOGLOBIN: CPT | Performed by: FAMILY MEDICINE

## 2024-12-23 PROCEDURE — 99213 OFFICE O/P EST LOW 20 MIN: CPT | Performed by: FAMILY MEDICINE

## 2024-12-23 NOTE — PROGRESS NOTES
12/23/2024  5:34 PM    Eva Beasley is a 49 year old female.    Chief complaint(s):   Chief Complaint   Patient presents with    Menstrual Problem    Chest Pain     Sharp pain on right side of chest     Headache     HPI:     Eva Beasley primary complaint is regarding as above.     Patient is a 49-year-old female who is following up regarding irregular menstrual periods.  In the last month her menstrual cycle was more regular.  I advised her to continue taking the birth control pills as directed.    In addition she has been having some sternal chest pains on and off.  It is sharp pain associated with no radiation, no shortness of breath and no palpitations.  It is tender when she presses on her sternum.  Also associated with some headaches but no nausea vomiting no fever no URI symptoms.      HISTORY:  Past Medical History:    Hypothyroidism    Polycystic ovarian disease    Rheumatic fever      Past Surgical History:   Procedure Laterality Date    Appendectomy      Colonoscopy N/A 10/28/2023    Procedure: COLONOSCOPY;  Surgeon: Ollie Whalen MD;  Location: [facility] ENDOSCOPY      Family History   Problem Relation Age of Onset    Circulatory Problems Mother         Peripheral vascular disease    Cancer Paternal Grandfather         Liver cancer     Alcohol and Other Disorders Associated Paternal Grandfather         Alcoholism    Diabetes Paternal Grandmother         Type 2      Social History:   Social History     Socioeconomic History    Marital status:    Tobacco Use    Smoking status: Never     Passive exposure: Never    Smokeless tobacco: Never   Vaping Use    Vaping status: Never Used   Substance and Sexual Activity    Alcohol use: No     Alcohol/week: 0.0 standard drinks of alcohol    Drug use: No     Comment: No history of illicit substance abuse        Immunizations:   Immunization History   Administered Date(s) Administered    FLULAVAL 6 months  & older 0.5 ml Prefilled syringe (42959) 12/04/2017, 11/02/2018, 10/22/2019, 10/23/2021, 01/14/2023    FLUZONE 6 months and older PFS 0.5 ml (15140) 10/01/2016, 12/04/2017, 11/02/2018, 12/16/2023    Flulaval, 3 Years & >, IM 10/16/2007, 09/28/2010    Fluvirin, 3 Years & >, Im 12/13/2005, 11/02/2006, 11/06/2012    Influenza 11/11/2008, 10/10/2009, 09/23/2014, 09/16/2015    Influenza Vaccine, trivalent (IIV3), PF 0.5mL (20416) 10/19/2024    Influenza Virus Vaccine, H1N1 12/19/2009    TD 02/29/2004    TDAP 07/25/2013, 01/14/2023       Medications (Active prior to today's visit):  Current Outpatient Medications   Medication Sig Dispense Refill    ergocalciferol 1.25 MG (77372 UT) Oral Cap Take 1 capsule (50,000 Units total) by mouth once a week. 12 capsule 0    montelukast 10 MG Oral Tab Take 1 tablet (10 mg total) by mouth nightly. 90 tablet 2    Continuous Glucose Sensor (FREESTYLE DEVEN 3 SENSOR) Does not apply Misc Take 1 each by mouth every 14 (fourteen) days. 6 each 1    traMADol 50 MG Oral Tab Take 1 tablet (50 mg total) by mouth every 8 (eight) hours as needed for Pain. 40 tablet 0    atorvastatin 20 MG Oral Tab Take 1 tablet (20 mg total) by mouth nightly. 90 tablet 3    ibuprofen 600 MG Oral Tab Take 1 tablet (600 mg total) by mouth every 6 (six) hours as needed for Pain. Always take it with food. 60 tablet 0    gabapentin 100 MG Oral Cap Take 1 capsule (100 mg total) by mouth 3 (three) times daily. 90 capsule 1    Diclofenac-miSOPROStol 50-0.2 MG Oral Tab EC Take 1 tablet by mouth 2 (two) times daily with meals. 180 tablet 1    levothyroxine 137 MCG Oral Tab Take 137 mcg by mouth before breakfast. 90 tablet 3    escitalopram 10 MG Oral Tab Take 1 tablet (10 mg total) by mouth every morning. 90 tablet 3    Tirzepatide (MOUNJARO) 15 MG/0.5ML Subcutaneous Solution Pen-injector Inject 15 mg into the skin once a week. 2 mL 4    Loratadine-Pseudoephedrine (EQL ALLERGY/CONGESTION RELIEF OR) Take 1 tablet by mouth  Q12H.      pantoprazole 40 MG Oral Tab EC Take 1 tablet (40 mg total) by mouth daily. 30 tablet 3    fluticasone propionate 50 MCG/ACT Nasal Suspension 1 spray by Nasal route 2 (two) times daily. 16 g 3    simethicone 125 MG Oral Chew Tab Chew 2 tablets (250 mg total) by mouth every 6 (six) hours as needed for FLATULENCE. 60 tablet 1    Norethin-Eth Estrad Triphasic (DASETTA 7/7/7) 0.5/0.75/1-35 MG-MCG Oral Tab Take 1 tablet by mouth daily. (Patient not taking: Reported on 12/23/2024) 84 tablet 3       Allergies:  Allergies[1]      ROS:   Review of Systems   Constitutional:  Negative for appetite change and fever.   Eyes:  Negative for visual disturbance.   Respiratory:  Negative for shortness of breath.    Cardiovascular:  Positive for chest pain.   Gastrointestinal:  Negative for abdominal pain, nausea and vomiting.   Genitourinary:  Positive for menstrual problem.   Musculoskeletal:  Negative for back pain.   Skin:  Negative for rash.   Neurological:  Negative for dizziness and headaches.       PHYSICAL EXAM:   VS: /72 (BP Location: Right arm, Patient Position: Sitting, Cuff Size: large)   Pulse 76   Ht 5' 6\" (1.676 m)   Wt 232 lb (105.2 kg)   LMP 12/22/2024 (Approximate)   BMI 37.45 kg/m²     Physical Exam  Vitals reviewed.   Constitutional:       General: She is not in acute distress.     Appearance: Normal appearance.   HENT:      Head: Normocephalic.   Eyes:      Conjunctiva/sclera: Conjunctivae normal.   Cardiovascular:      Rate and Rhythm: Normal rate.   Pulmonary:      Effort: Pulmonary effort is normal.   Chest:      Comments: + sternal tenderness   Musculoskeletal:      Cervical back: Neck supple.   Skin:     Findings: No rash.   Psychiatric:         Mood and Affect: Mood normal.       LABORATORY RESULTS:   No results found for: \"URCOLOR\", \"URCLA\", \"URINELEUK\", \"URINENITRITE\", \"URINEBLOOD\"   Results for orders placed or performed in visit on 12/23/24   Hemoglobin    Collection Time: 12/23/24   5:57 PM   Result Value Ref Range    Hemoglobin 11.6 (A) 13 - 17 g/dL    Cuvette Lot # [id number] Numeric    Cuvette Expiration Date 41,026 Date       EKG / Spirometry : -     Radiology: XR KNEE, COMPLETE (4 OR MORE VIEWS), RIGHT (CPT=73564)    Result Date: 12/12/2024  PROCEDURE: XR KNEE, COMPLETE (4 OR MORE VIEWS), RIGHT (CPT=73564)  COMPARISON: None.  INDICATIONS: Acute right knee pain for 2 months; no known trauma.  TECHNIQUE: 4 views were obtained.   FINDINGS:   Bone mineralization is normal.  There is no acute fracture/dislocation.  There are slight to moderate symmetric degenerative changes within both knees manifested by minimal bony hypertrophy, articular space narrowing, subarticular sclerosis, and tibial spine sharpening.         CONCLUSION: Slight to moderate symmetric degenerative changes within both knees.    Dictated by (CST): Merritt De Guzman MD on 12/12/2024 at 5:19 PM     Finalized by (CST): Merritt De Guzman MD on 12/12/2024 at 5:19 PM             ASSESSMENT/PLAN:   Assessment   Encounter Diagnoses   Name Primary?    Irregular menstrual cycle Yes    Costochondritis        MEDICATIONS:CPM        LABORATORY & ORDERS:   Orders Placed This Encounter   Procedures    Hemoglobin        RECOMMENDATIONS given include: Patient was reassured of  her medical condition and all questions and concerns were answered. Patient was informed to please, call our office with any new or further questions or concerns that may come up in the near future. Notify Dr Weir or the [facility] if there is a deterioration or worsening of the medical condition. Also, inform the doctor with any new symptoms or medications' side effects.      FOLLOW-UP: Schedule a follow-up visit in  prn/ [facility].            Orders This Visit:  Orders Placed This Encounter   Procedures    Hemoglobin       Meds This Visit:  Requested Prescriptions      No prescriptions requested or ordered in this encounter       Imaging & Referrals:  None          BRANDEN HUNTER MD         [1]   Allergies  Allergen Reactions    Contrave [Naltrexone-Bupropion Hcl Er] DIZZINESS and OTHER (SEE COMMENTS)     Headaches     Victoza OTHER (SEE COMMENTS)     Abdominal pain

## 2025-01-21 ENCOUNTER — OFFICE VISIT (OUTPATIENT)
Dept: PHYSICAL THERAPY | Age: 50
End: 2025-01-21
Attending: ORTHOPAEDIC SURGERY
Payer: COMMERCIAL

## 2025-01-21 ENCOUNTER — TELEPHONE (OUTPATIENT)
Dept: PHYSICAL THERAPY | Facility: HOSPITAL | Age: 50
End: 2025-01-21

## 2025-01-21 DIAGNOSIS — M17.11 PRIMARY OSTEOARTHRITIS OF RIGHT KNEE: Primary | ICD-10-CM

## 2025-01-21 PROCEDURE — 97110 THERAPEUTIC EXERCISES: CPT

## 2025-01-21 PROCEDURE — 97162 PT EVAL MOD COMPLEX 30 MIN: CPT

## 2025-01-22 NOTE — PROGRESS NOTES
LOWER EXTREMITY EVALUATION:     Diagnosis:   Primary osteoarthritis of right knee (M17.11) Patient:  Eva Smithrez (49 year old, female)        Referring Provider: Britton Rodriguez  Today's Date   2025    Precautions:  None   Date of Evaluation: 25  Next MD visit: 25  Date of Surgery: No data recorded     PATIENT SUMMARY   Summary of chief complaints: pain on the B knees, R>L. states that  when her pain couold affect the R LE: all the way from  hip and ankle, when pain is bad she cant bend the knee  History of current condition: pt states that she has been having issues for 5 years with arthritis, but pain was only affecting her when weather is cold, when she was walking to her car  3 mos, she felt something crack and since then she has been having more pain: worse pain  that day and had to use a cane x3 weeks. She states that when pain is worse whe uses  when she has more pain   Pain level: current 5 /10, at best 3 /10, at worst 10 /10  Description of symptoms: constant, worse with bending the knee, travel on the RLE, L knee local, gives out   Occupation: not working   Leisure activities/Hobbies: she started walking for exercises, bike and steps   Prior level of function: pt was doing well befter 3 prior to 3 mos and was not using cane, able to do some exercises  Current limitations: She states that when pain is worse whe uses  when she has more pain, when pain is high she could not sleep on RLE, when pain high she cant bend her knee, could not drive when pain is worse she needs to manage steps backwards  Pt goals: to go back to exercises and manage weight and sugar level  Past medical history was reviewed with Eva.  Significant findings include: DM, hypothyroidism  Imaging/Tests: MRI/Xrays   Eva  has a past medical history of Hypothyroidism, Polycystic ovarian disease, and Rheumatic fever.  She  has a past surgical history that includes appendectomy  and colonoscopy (N/A, 10/28/2023).    ASSESSMENT  Eva presents to physical therapy evaluation with primary c/o pain on the B knees, R>L. states that  when her pain couold affect the R LE: all the way from  hip and ankle, when pain is bad she cant bend the knee. The results of the objective tests and measures show  impairments of  joint mobility, motor function, muscle performance, muscle endurance, range of motion, coordination, balance, and gait with these areas primarily affected :  BLE R>LLE. Functional deficits include but are not limited to She states that when pain is worse whe uses  when she has more pain, when pain is high she could not sleep on RLE, when pain high she cant bend her knee, could not drive when pain is worse she needs to manage steps backwards. Signs and symptoms are consistent with diagnosis of Primary osteoarthritis of right knee (M17.11). Pt and PT discussed evaluation findings, pathology, POC and HEP.  Pt voiced understanding and performs HEP correctly without reported pain. Skilled Physical Therapy is medically necessary to address the above impairments and reach functional goals.    OBJECTIVE:   Musculoskeletal  Observation: mod I with sit to stand  Palpation: 2/4 pain on RLE lateral ITB area     HUBER ROM WNL and Strength (5/5) except below:  (* denotes performed with pain), hip and ankle motions are WFL,painfree  Hip   ROM MMT (-/5)    R L R L     Flex (L2)     4/5 4/5     Ext      4/5 4/5     Abd   5/5         ER     4+/5 4+/5     IR     4+/5 4+/5     ,   Knee   ROM MMT (-/5)    R L R L     Flex 80 115 4+/5 4+/5     Ext (L3) 0 0 4+/5 5/5     ,   Ankle/Foot   ROM MMT (-/5)    R L R L     PF     3+/5 3+/5     DF (L4)     5/5 5/5     Inversion             Eversion             Grt Toe Ext (L5)                 Balance and Functional Mobility:  Gait: pt ambulates on level ground with normal mechanics (stairs with pain  asceding, descedning single step pattern)   Balance: SLS: R 3  sec,  L 3 sec    Today's Treatment and Response:   Pt education was provided on exam findings, treatment diagnosis, treatment plan, expectations, and prognosis.  Today's Treatment       1/21/2025   Treatment   Therapeutic Exercise QS 10 secs x10  Heel slides 2x10  Supine TKEs  with knee roll 5 secs x15  SL clams 2x10  SL hip abd 2x10  SLR x10   Therapeutic Exercise Min 23   Total of Timed Procedures 23   Total of Service Based 0   Total Treatment Time 23         Patient was instructed in and issued a HEP for:      Charges:  PT EVAL: Moderate Complexity,    In agreement with evaluation findings and clinical rationale, this evaluation involved MODERATE COMPLEXITY decision making due to 1-2 personal factors/comorbidities, 3 or more body structures involved/activity limitations, and evolving symptoms as documented in the evaluation.                                                                                                                PLAN OF CARE:    Goals: (to be met in  )   Goals       Therapy Goals      Not Met Progress Toward Partially Met Met   Pt will demonstrate  painfree  knee extension ROM to 0 deg to allow proper heel strike during gait and terminal knee extension in stance. [] [] [] []   Pt will improve knee AROM flexion to >100 degrees to improve ability to perform stairs management and ease of ADLS. [] [] [] []   Pt will improve BLE strength to 5/5  for muscles graded below  5/5 to ascend 1 flight of stairs reciprocally . [] [] [] []   Pt will improve SLS to 10 or better to improve safety with gait on uneven surfaces such as grass and gravel. [] [] [] []     Pt will report decreased in pain and symptoms and functional limitations  by 50% or better to be able return to PLOF          Pt will be independent and compliant with comprehensive HEP to maintain progress achieved in PT.   [] [] [] []                   Frequency / Duration: Patient will be seen  x/week or a total of    visits over a 90 day  period. Treatment will include:      Education or treatment limitation:     Rehab Potential:       LEFS Score  LEFS Score: (Patient-Rptd) 61.25 % (1/21/2025  5:40 PM)      Patient/Family/Caregiver was advised of these findings, precautions, and treatment options and has agreed to actively participate in planning and for this course of care.    Thank you for your referral. Please co-sign or sign and return this letter via fax as soon as possible to [phone]. If you have any questions, please contact me at Dept: [phone]    Sincerely,  Electronically signed by therapist: Allie Finney PT  Physician's certification required: Yes  I certify the need for these services furnished under this plan of treatment and while under my care.    X___________________________________________________ Date____________________    Certification From: 1/21/2025  To:4/21/2025

## 2025-01-23 ENCOUNTER — OFFICE VISIT (OUTPATIENT)
Dept: ORTHOPEDICS CLINIC | Facility: CLINIC | Age: 50
End: 2025-01-23

## 2025-01-23 VITALS — SYSTOLIC BLOOD PRESSURE: 104 MMHG | DIASTOLIC BLOOD PRESSURE: 64 MMHG | TEMPERATURE: 73 F

## 2025-01-23 DIAGNOSIS — M17.11 PRIMARY OSTEOARTHRITIS OF RIGHT KNEE: Primary | ICD-10-CM

## 2025-01-23 PROCEDURE — 99213 OFFICE O/P EST LOW 20 MIN: CPT | Performed by: ORTHOPAEDIC SURGERY

## 2025-01-23 PROCEDURE — 20610 DRAIN/INJ JOINT/BURSA W/O US: CPT | Performed by: ORTHOPAEDIC SURGERY

## 2025-01-23 RX ORDER — MELOXICAM 7.5 MG/1
7.5 TABLET ORAL DAILY
Qty: 30 TABLET | Refills: 2 | Status: SHIPPED | OUTPATIENT
Start: 2025-01-23

## 2025-01-23 NOTE — PROGRESS NOTES
NURSING INTAKE COMMENTS:   Chief Complaint   Patient presents with    Knee Pain     Use  Marco ID# [id number] knee f/u-  here for Durolane injection today       HPI: This 49 year old female presents today with complaints of right knee pain follow-up.  She is here for a Durolane injection.  She reports no change in her symptoms.  She is undergoing outpatient physical therapy.    Past Medical History:    Hypothyroidism    Polycystic ovarian disease    Rheumatic fever     Past Surgical History:   Procedure Laterality Date    Appendectomy      Colonoscopy N/A 10/28/2023    Procedure: COLONOSCOPY;  Surgeon: Ollie Whalen MD;  Location: [facility] ENDOSCOPY     Current Outpatient Medications   Medication Sig Dispense Refill    Meloxicam 7.5 MG Oral Tab Take 1 tablet (7.5 mg total) by mouth daily. 30 tablet 2    ergocalciferol 1.25 MG (80081 UT) Oral Cap Take 1 capsule (50,000 Units total) by mouth once a week. 12 capsule 0    montelukast 10 MG Oral Tab Take 1 tablet (10 mg total) by mouth nightly. 90 tablet 2    Continuous Glucose Sensor (FREESTYLE DEVEN 3 SENSOR) Does not apply Misc Take 1 each by mouth every 14 (fourteen) days. 6 each 1    traMADol 50 MG Oral Tab Take 1 tablet (50 mg total) by mouth every 8 (eight) hours as needed for Pain. 40 tablet 0    atorvastatin 20 MG Oral Tab Take 1 tablet (20 mg total) by mouth nightly. 90 tablet 3    ibuprofen 600 MG Oral Tab Take 1 tablet (600 mg total) by mouth every 6 (six) hours as needed for Pain. Always take it with food. 60 tablet 0    gabapentin 100 MG Oral Cap Take 1 capsule (100 mg total) by mouth 3 (three) times daily. 90 capsule 1    Norethin-Eth Estrad Triphasic (DASETTA 7/7/7) 0.5/0.75/1-35 MG-MCG Oral Tab Take 1 tablet by mouth daily. (Patient not taking: Reported on 12/23/2024) 84 tablet 3    Diclofenac-miSOPROStol 50-0.2 MG Oral Tab EC Take 1 tablet by mouth 2 (two) times daily with meals. 180 tablet 1    levothyroxine 137 MCG Oral Tab  Take 137 mcg by mouth before breakfast. 90 tablet 3    escitalopram 10 MG Oral Tab Take 1 tablet (10 mg total) by mouth every morning. 90 tablet 3    Tirzepatide (MOUNJARO) 15 MG/0.5ML Subcutaneous Solution Pen-injector Inject 15 mg into the skin once a week. 2 mL 4    Loratadine-Pseudoephedrine (EQL ALLERGY/CONGESTION RELIEF OR) Take 1 tablet by mouth Q12H.      pantoprazole 40 MG Oral Tab EC Take 1 tablet (40 mg total) by mouth daily. 30 tablet 3    fluticasone propionate 50 MCG/ACT Nasal Suspension 1 spray by Nasal route 2 (two) times daily. 16 g 3    simethicone 125 MG Oral Chew Tab Chew 2 tablets (250 mg total) by mouth every 6 (six) hours as needed for FLATULENCE. 60 tablet 1     Allergies[1]  Family History   Problem Relation Age of Onset    Circulatory Problems Mother         Peripheral vascular disease    Cancer Paternal Grandfather         Liver cancer     Alcohol and Other Disorders Associated Paternal Grandfather         Alcoholism    Diabetes Paternal Grandmother         Type 2       Social History     Occupational History    Not on file   Tobacco Use    Smoking status: Never     Passive exposure: Never    Smokeless tobacco: Never   Vaping Use    Vaping status: Never Used   Substance and Sexual Activity    Alcohol use: No     Alcohol/week: 0.0 standard drinks of alcohol    Drug use: No     Comment: No history of illicit substance abuse    Sexual activity: Not on file        Review of Systems:  GENERAL: denies fevers, chills, night sweats, fatigue, unintentional weight loss/gain  SKIN: denies skin lesions, open sores, rash  HEENT:denies recent vision change, new nasal congestion,hearing loss, tinnitus, sore throat, headaches  RESPIRATORY: denies new shortness of breath, cough, asthma, wheezing  CARDIOVASCULAR: denies chest pain, leg cramps with exertion, palpitations, leg swelling  GI: denies abdominal pain, nausea, vomiting, diarrhea, constipation, hematochezia, worsening heartburn or stomach  ulcers  : denies dysuria, hematuria, incontinence, increased frequency, urgency, difficulty urinating  MUSCULOSKELETAL: denies musculoskeletal complaints other than in HPI  NEURO: denies numbness, tingling, weakness, balance issues, dizziness, memory loss  PSYCHIATRIC: denies Hx of depression, anxiety, other psychiatric disorders  HEMATOLOGIC: denies blood clots, anemia, blood clotting disorders, blood transfusion  ENDOCRINE: denies autoimmune disease, thyroid issues, or diabetes  ALLERGY: denies asthma, seasonal allergies    Physical Examination:    /64   Temp (!) 73 °F (22.8 °C)   LMP 12/22/2024 (Approximate)   Constitutional: appears well hydrated, alert and responsive, no acute distress noted  Extremities: Right knee examination unchanged.  Trace effusion.  Neurological: Unchanged    Imaging:   No results found.     Labs:  Lab Results   Component Value Date    WBC 6.4 10/19/2024    HGB 12.6 10/19/2024    .0 10/19/2024      Lab Results   Component Value Date    GLU 82 10/19/2024    BUN 16 10/19/2024    CREATSERUM 0.69 10/19/2024    GFRNAA 113 10/23/2021    GFRAA 130 10/23/2021        Assessment and Plan:  Diagnoses and all orders for this visit:    Primary osteoarthritis of right knee  -     arthrocentesis major joint  -     sodium hyaluronate (DUROLANE) 60 mg    Other orders  -     Meloxicam 7.5 MG Oral Tab; Take 1 tablet (7.5 mg total) by mouth daily.        Assessment: Right knee osteoarthritis, primary    Plan: Using sterile technique and a superolateral parapatellar approach, the right knee was injected with 60 mg of Durolane.  I attempted an aspiration but no fluid was retrieved.  Advised icing, oral anti-inflammatory use, activity modifications, ongoing outpatient therapy.  Follow-up again as needed.    Follow Up: Return if symptoms worsen or fail to improve.    SARAH FLETCHER MD       [1]   Allergies  Allergen Reactions    Contrave [Naltrexone-Bupropion Hcl Er] DIZZINESS and OTHER (SEE  COMMENTS)     Headaches     Victoza OTHER (SEE COMMENTS)     Abdominal pain

## 2025-01-24 ENCOUNTER — OFFICE VISIT (OUTPATIENT)
Dept: ENDOCRINOLOGY CLINIC | Facility: CLINIC | Age: 50
End: 2025-01-24

## 2025-01-24 ENCOUNTER — LAB ENCOUNTER (OUTPATIENT)
Dept: LAB | Facility: HOSPITAL | Age: 50
End: 2025-01-24
Payer: COMMERCIAL

## 2025-01-24 VITALS
HEART RATE: 65 BPM | BODY MASS INDEX: 37.61 KG/M2 | HEIGHT: 66 IN | SYSTOLIC BLOOD PRESSURE: 119 MMHG | DIASTOLIC BLOOD PRESSURE: 66 MMHG | WEIGHT: 234 LBS

## 2025-01-24 DIAGNOSIS — E11.9 CONTROLLED TYPE 2 DIABETES MELLITUS WITHOUT COMPLICATION, WITHOUT LONG-TERM CURRENT USE OF INSULIN (HCC): Primary | ICD-10-CM

## 2025-01-24 DIAGNOSIS — E03.9 HYPOTHYROIDISM, UNSPECIFIED TYPE: ICD-10-CM

## 2025-01-24 DIAGNOSIS — E11.9 CONTROLLED TYPE 2 DIABETES MELLITUS WITHOUT COMPLICATION, WITHOUT LONG-TERM CURRENT USE OF INSULIN (HCC): ICD-10-CM

## 2025-01-24 LAB
CHOLEST SERPL-MCNC: 181 MG/DL (ref ?–200)
FASTING PATIENT LIPID ANSWER: YES
GLUCOSE BLOOD: 93
HDLC SERPL-MCNC: 61 MG/DL (ref 40–59)
HEMOGLOBIN A1C: 5.5 % (ref 4.3–5.6)
LDLC SERPL CALC-MCNC: 106 MG/DL (ref ?–100)
NONHDLC SERPL-MCNC: 120 MG/DL (ref ?–130)
T4 FREE SERPL-MCNC: 1.5 NG/DL (ref 0.8–1.7)
TEST STRIP LOT #: NORMAL NUMERIC
TRIGL SERPL-MCNC: 74 MG/DL (ref 30–149)
TSI SER-ACNC: 0.97 UIU/ML (ref 0.55–4.78)
VLDLC SERPL CALC-MCNC: 12 MG/DL (ref 0–30)

## 2025-01-24 PROCEDURE — 83036 HEMOGLOBIN GLYCOSYLATED A1C: CPT

## 2025-01-24 PROCEDURE — 82947 ASSAY GLUCOSE BLOOD QUANT: CPT

## 2025-01-24 PROCEDURE — 36415 COLL VENOUS BLD VENIPUNCTURE: CPT

## 2025-01-24 PROCEDURE — 84443 ASSAY THYROID STIM HORMONE: CPT

## 2025-01-24 PROCEDURE — 80061 LIPID PANEL: CPT

## 2025-01-24 PROCEDURE — 84439 ASSAY OF FREE THYROXINE: CPT

## 2025-01-24 PROCEDURE — 99214 OFFICE O/P EST MOD 30 MIN: CPT

## 2025-01-24 RX ORDER — ACYCLOVIR 400 MG/1
1 TABLET ORAL
Qty: 3 EACH | Refills: 5 | Status: SHIPPED | OUTPATIENT
Start: 2025-01-24

## 2025-01-24 RX ORDER — ATORVASTATIN CALCIUM 40 MG/1
40 TABLET, FILM COATED ORAL NIGHTLY
Qty: 90 TABLET | Refills: 1 | Status: SHIPPED | OUTPATIENT
Start: 2025-01-24

## 2025-01-24 RX ORDER — TIRZEPATIDE 15 MG/.5ML
15 INJECTION, SOLUTION SUBCUTANEOUS WEEKLY
Qty: 2 ML | Refills: 5 | Status: SHIPPED | OUTPATIENT
Start: 2025-01-24

## 2025-01-24 NOTE — PROGRESS NOTES
Name: Eva Beasley  Date: 1/24/25    Referring Physician: No ref. provider found    HISTORY OF PRESENT ILLNESS     Eva Beasley is a 49 year old female who presents for follow up for diabetes mellitus and hypothyroidism.    Diagnosed with diabetes 2 years ago after having prediabetes for 8-9 years prior. In the last several months she has made significant diet changes and has increased exercise. She has been able to lose 30lbs. Was on several oral medications but then transitioned to Mounjaro and is now maintained on weekly GLP-1 agonist alone.     Has not been exercising due to knee injury in the last few months and admits to some nonadherence with diet during holidays.     Diabetes History:  Diagnosed- 7/2022; prediabetes 8 years ago and then progressed to DM type 2 two years ago.   Patient has not had hospitalizations for blood sugar issues    Prior glycohemoglobin were 6.0% 3/2024; 5.6% POC today   Glucose in clinic today - 93 mg/dl     Dietary compliance: Good- Follows low CHO diet, eats three meals daily. Avoids bread, tortillas, chips and other high CHO foods. Admits to more cheating with bread/tortillas over Christmas holiday but since holidays has been more diligent     Exercise: Yes- riding stationary bike 15-30 minutes daily 2 twice daily.     Polyuria/polydipsia: No  Blurred vision: No     Episodes of hypoglycemia: No  Blood Glucose:  Checking glucose 3 times daily  Using Freestyle Anthony   Reviewed data for 30 days prior to visit date:    98% of glucose readings in target range   2% of glucose readings below target range   0% above target range     GMI- 5.7%     Previous DM therapies:  - Actos - stopped when started on GLP-1 agonist   - Metformin - GI side effects - abdominal pain     Current DM Regimen:  Mounjaro 15 mg subcutaneous weekly     Modifying factors:  Medication adherence: Yes   Recent steroids, illness or infections: No       REVIEW OF  SYSTEMS  Eyes: Diabetic retinopathy present: No            Most recent visit to eye doctor in last 12 months: Yes- 1/2025     CV: Cardiovascular disease present: No         Hypertension present: No, previously on medication but stopped with weight loss in the last year.          Hyperlipidemia present: No         Peripheral Vascular Disease present: No    : Nephropathy present: No    Neuro: Neuropathy present: No- denies     Skin: Infection or ulceration: No    Osteoporosis: No    Thyroid disease: Yes; diagnosed with hypothyroidism 20 years ago. Now maintained on Levothyroxine 137 mcg PO daily. Normal TFT's 10/2024. Does note difficulty with weight loss, although has been able to lose weight with addition of Mounjaro. + fatigue and constipation as well.       Medications:     Current Outpatient Medications:     Continuous Glucose Sensor (DEXCOM G7 SENSOR) Does not apply Misc, 1 each Every 10 days. Use as directed every 10 days, Disp: 3 each, Rfl: 5    Meloxicam 7.5 MG Oral Tab, Take 1 tablet (7.5 mg total) by mouth daily., Disp: 30 tablet, Rfl: 2    ergocalciferol 1.25 MG (66923 UT) Oral Cap, Take 1 capsule (50,000 Units total) by mouth once a week., Disp: 12 capsule, Rfl: 0    montelukast 10 MG Oral Tab, Take 1 tablet (10 mg total) by mouth nightly., Disp: 90 tablet, Rfl: 2    Continuous Glucose Sensor (FREESTYLE DEVEN 3 SENSOR) Does not apply Misc, Take 1 each by mouth every 14 (fourteen) days., Disp: 6 each, Rfl: 1    traMADol 50 MG Oral Tab, Take 1 tablet (50 mg total) by mouth every 8 (eight) hours as needed for Pain., Disp: 40 tablet, Rfl: 0    atorvastatin 20 MG Oral Tab, Take 1 tablet (20 mg total) by mouth nightly., Disp: 90 tablet, Rfl: 3    ibuprofen 600 MG Oral Tab, Take 1 tablet (600 mg total) by mouth every 6 (six) hours as needed for Pain. Always take it with food., Disp: 60 tablet, Rfl: 0    gabapentin 100 MG Oral Cap, Take 1 capsule (100 mg total) by mouth 3 (three) times daily., Disp: 90 capsule,  Rfl: 1    Norethin-Eth Estrad Triphasic (DASETTA 7/7/7) 0.5/0.75/1-35 MG-MCG Oral Tab, Take 1 tablet by mouth daily. (Patient not taking: Reported on 12/23/2024), Disp: 84 tablet, Rfl: 3    Diclofenac-miSOPROStol 50-0.2 MG Oral Tab EC, Take 1 tablet by mouth 2 (two) times daily with meals., Disp: 180 tablet, Rfl: 1    levothyroxine 137 MCG Oral Tab, Take 137 mcg by mouth before breakfast., Disp: 90 tablet, Rfl: 3    escitalopram 10 MG Oral Tab, Take 1 tablet (10 mg total) by mouth every morning., Disp: 90 tablet, Rfl: 3    Tirzepatide (MOUNJARO) 15 MG/0.5ML Subcutaneous Solution Pen-injector, Inject 15 mg into the skin once a week., Disp: 2 mL, Rfl: 4    Loratadine-Pseudoephedrine (EQL ALLERGY/CONGESTION RELIEF OR), Take 1 tablet by mouth Q12H., Disp: , Rfl:     pantoprazole 40 MG Oral Tab EC, Take 1 tablet (40 mg total) by mouth daily., Disp: 30 tablet, Rfl: 3    fluticasone propionate 50 MCG/ACT Nasal Suspension, 1 spray by Nasal route 2 (two) times daily., Disp: 16 g, Rfl: 3    simethicone 125 MG Oral Chew Tab, Chew 2 tablets (250 mg total) by mouth every 6 (six) hours as needed for FLATULENCE., Disp: 60 tablet, Rfl: 1     Allergies:   Allergies   Allergen Reactions    Contrave [Naltrexone-Bupropion Hcl Er] DIZZINESS and OTHER (SEE COMMENTS)     Headaches     Victoza OTHER (SEE COMMENTS)     Abdominal pain       Social History:   Social History     Socioeconomic History    Marital status:    Tobacco Use    Smoking status: Never     Passive exposure: Never    Smokeless tobacco: Never   Vaping Use    Vaping status: Never Used   Substance and Sexual Activity    Alcohol use: No     Alcohol/week: 0.0 standard drinks of alcohol    Drug use: No     Comment: No history of illicit substance abuse       Medical History:   Past Medical History:    Hypothyroidism    Polycystic ovarian disease    Rheumatic fever       Surgical history:   Past Surgical History:   Procedure Laterality Date    Appendectomy       Colonoscopy N/A 10/28/2023    Procedure: COLONOSCOPY;  Surgeon: Ollie Whalen MD;  Location: [facility] ENDOSCOPY         PHYSICAL EXAM  Vitals:    01/24/25 0730   BP: 119/66   Pulse: 65   Weight: 234 lb (106.1 kg)   Height: 5' 6\" (1.676 m)       General Appearance:  alert, well developed, in no acute distress  Eyes:  normal conjunctivae, sclera, and normal pupils  Neck: Trachea midline: Normal  Back: no kyphosis or back tenderness  Lymph Nodes:  No abnormal nodes noted  Musculoskeletal:  normal muscle strength and tone  Skin:  normal moisture and skin texture  Hair & Nails:  normal scalp hair     Hematologic:  no excessive bruising  Neuro:  sensory grossly intact and motor grossly intact.   Psychiatric:  oriented to time, self, and place  Nutritional:  no abnormal weight gain or loss      ASSESSMENT/PLAN:    Diabetes mellitus type 2 controlled  - [id number]-0.6% 3/2024  -Congratulated patient on improved diet and exercise and weight loss in the last year.   -Reviewed ABC's of diabetes   - Reviewed pathogenesis of diabetes.   - Reviewed importance of good glycemic control to prevent microvascular and macrovascular complications including nephropathy, neuropathy, retinopathy, and cardiovascular disease.  - Reviewed importance of SBGM- check glucose 1-3 times daily   - Reviewed target glucose goals for patient  fasting and <180 post prandially   - Reviewed importance of following diabetic diet- recommended 135 grams of CHO per day or 45 grams per meal.   - Provided patient education materials    - Continue Mounjaro 15mg subcutaneous weekly. She has 3 weeks left of 12.5mg and will finish this first. Reviewed side effects and risks vs benefits of medication.     - Interested in trying Dexcom in place of Anthony due to alarms overnight for false lows- discussed possibility of compression lows with both sensors.     - normotensive   - no nephropathy- last lab 3/2024  - Lipids 10/2024- above goal. Now started on  atorvastatin- repeat labs today    - UTD with optho- last optho visit 1/2025  - normal foot exam 4/2024    Hypothyroidism  - TFT's in range 10/2024   -Complaining of more fatigue, constipation and hair loss  - Repeat TFT's today   -Currently maintained on Levothyroxine 137 mcg daily     RTC in 4 months   1/24/25  YOANDY Lind    A total of  35 minutes was spent on obtaining history, reviewing pertinent imaging/labs and specialists notes, evaluating patient, providing multiple treatment options, reinforcing diet/exercise and compliance, and completing documentation.

## 2025-01-24 NOTE — PROGRESS NOTES
Name: Eva Guzmán  YOB: 1975  Report Period: 12/28/2024 - 01/24/2025 (28 days)  Generated: 01/24/2025  Time CGM Active: 95%      Glucose Statistics and Targets  Average Glucose: 101 mg/dL  Glucose Management Indicator (GMI): 5.7%  Glucose Variability (%CV): 15.4%  Target Range: 70 - 180 mg/dL      Time in Ranges  Very High: >250 mg/dL --- 0%  High: 181 - 250 mg/dL --- 0%  Target Range: 70 - 180 mg/dL --- 98%  Low: 54 - 69 mg/dL --- 2%  Very Low: <54 mg/dL --- 0%

## 2025-02-04 ENCOUNTER — OFFICE VISIT (OUTPATIENT)
Dept: PHYSICAL THERAPY | Age: 50
End: 2025-02-04
Attending: ORTHOPAEDIC SURGERY
Payer: COMMERCIAL

## 2025-02-04 PROCEDURE — 97112 NEUROMUSCULAR REEDUCATION: CPT

## 2025-02-04 PROCEDURE — 97110 THERAPEUTIC EXERCISES: CPT

## 2025-02-05 NOTE — PROGRESS NOTES
Patient: Eva Beasley (49 year old, female) Referring Provider:  Insurance:   Diagnosis: Primary osteoarthritis of right knee (M17.11) Britton PÉREZSmart Voicemail[employer]frain  [employer]   Date of Surgery: No data recorded Next MD visit:  N/A   Precautions:  None 1/23/25 Referral Information:    Date of Evaluation: Req: 0, Auth: 0, Exp:     01/21/25 POC Auth Visits:  10       Today's Date   2/4/2025    Subjective          Pain: 5/10     Objective          Pt demonstrated antalgic gait today.      ROM MMT (-/5)    R L R L     Flex 103 115 4+/5 4+/5     Ext (L3) 0 0 4+/5 5/5           Assessment  Slight antalgia today     Improved AROM towards flexion this day    Goals (to be met in 10 visits)   Goals       Therapy Goals      Not Met Progress Toward Partially Met Met   Pt will demonstrate  painfree  knee extension ROM to 0 deg to allow proper heel strike during gait and terminal knee extension in stance. [] [x] [] []   Pt will improve knee AROM flexion to >100 degrees to improve ability to perform stairs management and ease of ADLS. [] [x] [] []   Pt will improve BLE strength to 5/5  for muscles graded below  5/5 to ascend 1 flight of stairs reciprocally . [] [x] [] []   Pt will improve SLS to 10 or better to improve safety with gait on uneven surfaces such as grass and gravel. [] [x] [] []     Pt will report decreased in pain and symptoms and functional limitations  by 50% or better to be able return to PLOF    [] [x] []    Pt will be independent and compliant with comprehensive HEP to maintain progress achieved in PT.   [] [x] [] []                   Plan       Treatment Last 4 Visits       1/21/2025 2/4/2025   PT Treatment   Treatment Day  2   Therapeutic Exercise QS 10 secs x10  Heel slides 2x10  Supine TKEs  with knee roll 5 secs x15  SL clams 2x10  SL hip abd 2x10  SLR x10  NU step level 5 x6 mins NU step level 5 x6 mins  QS 10 secs x10  Heel slides 2x10  SLR 2x10  SL hip abd 2x10  SL clams  2x10  Shuttle 4 bands 2x10 BLE  Shuttle R/LE 3 bands 2x10 each  Step lunges for flexion 10 secs x10   Neuro Re-Ed  SLS on foam  x10 x 5 secs   Step up on 4\" 2x10: forward and sideways  Standing TKEs with equal WB 10 secs x10 with ball on wall   Therapeutic Exercise Min 25 25   Neuro Re-Ed Min  15   Evaluation Min 20    Total of Timed Procedures 25 40   Total of Service Based 20 0   Total Treatment Time 45 40         HEP       Charges     ex x2, neuro clem x1

## 2025-02-06 ENCOUNTER — OFFICE VISIT (OUTPATIENT)
Dept: PHYSICAL THERAPY | Age: 50
End: 2025-02-06
Attending: ORTHOPAEDIC SURGERY
Payer: COMMERCIAL

## 2025-02-06 PROCEDURE — 97110 THERAPEUTIC EXERCISES: CPT

## 2025-02-06 PROCEDURE — 97112 NEUROMUSCULAR REEDUCATION: CPT

## 2025-02-06 NOTE — PROGRESS NOTES
Patient: Eva Beasley (49 year old, female) Referring Provider:  Insurance:   Diagnosis: Primary osteoarthritis of right knee (M17.11) Britton PÉREZDebteyeEfrain  [employer]   Date of Surgery: No data recorded Next MD visit:  N/A   Precautions:  None 1/23/25 Referral Information:    Date of Evaluation: Req: 0, Auth: 0, Exp:     01/21/25 POC Auth Visits:  10       Today's Date   2/6/2025    Subjective  pt denied pain after last session and states that she feels better today       Pain: 0/10     Objective     Observation/Transfers mod I  Gait:  pt ambulates on level ground with normal mechanics  Stairs:  Palpation: minimal paino n anterior knee    LE ROM AND STRENGTH:   BLE major joints of hip/knee and ankle are WFL , painfree except knee  below 105 flexion today versus 80 before, extension at 0    AROM /MMT: : (* denotes performed with pain)   RLE (MMT) LLE (MMT)   Hip flexion (L2) 4/5 4/5   Hip extension 4/5 4/5   Hip ER/IR 4+/5 4+/5   Hip abd 5/5 5/5      RLE (MMT) LLE (MMT)   Knee flexion 4+/5 4+/5   Knee ext (L3) 4+/5 5/5        RLE (MMT) LLE (MMT)   Ankle DF(L4) 5/5 5/5   Ankle PF(S1) 3+/5 3+/5         Assessment      ROM  is increased as noted  PT continued with challenging pt in performing exercises in     more loaded positions    amount of resistance applied for improve strength ,worked  within new ROM gains  in order to work towards goals     Additional HEP given to continue with progress gained in therapy. Tactile,verbal and written cues given for proper performance. Pt voiced understanding and performs HEP which  correctly without reported pain.Instructions were provided on how to modify as need or when to stop.                 Goals (to be met in 10 visits)   Goals       Therapy Goals      Not Met Progress Toward Partially Met Met   Pt will demonstrate  painfree  knee extension ROM to 0 deg to allow proper heel strike during gait and terminal knee extension in stance. [] [x] []  []   Pt will improve knee AROM flexion to >100 degrees to improve ability to perform stairs management and ease of ADLS. [] [x] [] []   Pt will improve BLE strength to 5/5  for muscles graded below  5/5 to ascend 1 flight of stairs reciprocally . [] [x] [] []   Pt will improve SLS to 10 or better to improve safety with gait on uneven surfaces such as grass and gravel. [] [x] [] []     Pt will report decreased in pain and symptoms and functional limitations  by 50% or better to be able return to PLOF    [] [x] []    Pt will be independent and compliant with comprehensive HEP to maintain progress achieved in PT.   [] [x] [] []                   Plan cont per POC       Treatment Last 4 Visits       1/21/2025 2/4/2025 2/6/2025   PT Treatment   Treatment Day  2 3   Therapeutic Exercise QS 10 secs x10  Heel slides 2x10  Supine TKEs  with knee roll 5 secs x15  SL clams 2x10  SL hip abd 2x10  SLR x10  NU step level 5 x6 mins NU step level 5 x6 mins  QS 10 secs x10  Heel slides 2x10  SLR 2x10  SL hip abd 2x10  SL clams 2x10  Shuttle 4 bands 2x10 BLE  Shuttle R/LE 3 bands 2x10 each  Step lunges for flexion 10 secs x10 NU step level 5 x6 mins  Heel slides 2x10  QS 10 secs x10  Standing hip exercises on a foam 2x10 RTB each for flexion/abd and extension  Shuttle 5 bands 2x10 BLE  Shuttle R/LE 3 bands 2x10   Shuttle B heel raises 3 bands 2x10  Standing ham curls GTB 2x10  Standing TKES GTB 10 secs x10   Neuro Re-Ed  SLS on foam  x10 x 5 secs   Step up on 4\" 2x10: forward and sideways  Standing TKEs with equal WB 10 secs x10 with ball on wall Bosu lunges 2x10 alternate  Lateral bosu lunges 2x10  Step up on bosu forward and lateral 2x10  SLS 10 secs x5   Manual Therapy   Patella mobs x2 mins   Therapeutic Exercise Min 25 25 40   Neuro Re-Ed Min  15 10   Evaluation Min 20     Total of Timed Procedures 25 40 50   Total of Service Based 20 0 0   Total Treatment Time 45 40 50         HEP  See AVS  tab    Charges     ex x 3, neuro clem  x1

## 2025-02-11 ENCOUNTER — APPOINTMENT (OUTPATIENT)
Dept: PHYSICAL THERAPY | Age: 50
End: 2025-02-11
Attending: ORTHOPAEDIC SURGERY
Payer: COMMERCIAL

## 2025-02-13 ENCOUNTER — OFFICE VISIT (OUTPATIENT)
Dept: PHYSICAL THERAPY | Age: 50
End: 2025-02-13
Attending: ORTHOPAEDIC SURGERY
Payer: COMMERCIAL

## 2025-02-13 PROCEDURE — 97112 NEUROMUSCULAR REEDUCATION: CPT

## 2025-02-13 PROCEDURE — 97110 THERAPEUTIC EXERCISES: CPT

## 2025-02-14 NOTE — PROGRESS NOTES
Patient: Eva Beasley (50 year old, female) Referring Provider:  Insurance:   Diagnosis: Primary osteoarthritis of right knee (M17.11) Britton PÉREZKohortEfrain  [email or web address]   Date of Surgery: No data recorded Next MD visit:  N/A   Precautions:  None 1/23/25 Referral Information:    Date of Evaluation: Req: 0, Auth: 0, Exp:     01/21/25 POC Auth Visits:  10       Today's Date   2/13/2025    Subjective  pt states that she has been to the gym 2x,80% from 100% PLOF, worst pain 3/10 x1 hr which could be from feet and is better with rest       Pain: 1/10     Objective     Gait:  pt ambulates on level ground with normal mechanics  Stairs:  Palpation: minimal pain on anterior knee     LE ROM AND STRENGTH:   BLE major joints of hip/knee and ankle are WFL , painfree knee flexion today 110versus 80 before, extension at 0     AROM /MMT: : (* denotes performed with pain)    RLE (MMT) LLE (MMT)   Hip flexion (L2) 5/5 5/5   Hip extension 4+/5 4+/5   Hip ER/IR 5/5 5/5   Hip abd 5/5 5/5        RLE (MMT) LLE (MMT)   Knee flexion 5/5 5/5   Knee ext (L3) 5/5 5/5           RLE (MMT) LLE (MMT)   Ankle DF(L4) 5/5 5/5   Ankle PF(S1) 3+/5 3+/5              Assessment     Pt has been 80% better, has been  trying to go to the gym. She feels better and will hold PT for her to see how she is doing and will schedule as needed  Improved MMT and range noted      Goals (to be met in 10 visits)   Goals       Therapy Goals      Not Met Progress Toward Partially Met Met   Pt will demonstrate  painfree  knee extension ROM to 0 deg to allow proper heel strike during gait and terminal knee extension in stance. [] [] [] [x]   Pt will improve knee AROM flexion to >100 degrees to improve ability to perform stairs management and ease of ADLS. [] [] [] [x]   Pt will improve BLE strength to 5/5  for muscles graded below  5/5 to ascend 1 flight of stairs reciprocally . [] [x] [] []   Pt will improve SLS to 10 or better to improve  safety with gait on uneven surfaces such as grass and gravel. [] [x] [] []     Pt will report decreased in pain and symptoms and functional limitations  by 50% or better to be able return to PLOF    [] [] [] [x]   Pt will be independent and compliant with comprehensive HEP to maintain progress achieved in PT.   [] [] [] [x]                   Plan  will hold chart for 2 weeks and will try HEP in the gym set up and will schedule as needed    Treatment Last 4 Visits       1/21/2025 2/4/2025 2/6/2025 2/13/2025   PT Treatment   Treatment Day  2 3 4   Therapeutic Exercise QS 10 secs x10  Heel slides 2x10  Supine TKEs  with knee roll 5 secs x15  SL clams 2x10  SL hip abd 2x10  SLR x10  NU step level 5 x6 mins NU step level 5 x6 mins  QS 10 secs x10  Heel slides 2x10  SLR 2x10  SL hip abd 2x10  SL clams 2x10  Shuttle 4 bands 2x10 BLE  Shuttle R/LE 3 bands 2x10 each  Step lunges for flexion 10 secs x10 NU step level 5 x6 mins  Heel slides 2x10  QS 10 secs x10  Standing hip exercises on a foam 2x10 RTB each for flexion/abd and extension  Shuttle 5 bands 2x10 BLE  Shuttle R/LE 3 bands 2x10   Shuttle B heel raises 3 bands 2x10  Standing ham curls GTB 2x10  Standing TKES GTB 10 secs x10 Nu step level x6 x6 mins  Shuttle 5 bands 2x10 BLE  Shuttle R/LE 3 bands 2x10   Standing heel raises x10 x2  Monster walks RTB x 6 rounds  Lateral walks RTB x 10 steps   Heel slides 2x10  reassessment     Neuro Re-Ed  SLS on foam  x10 x 5 secs   Step up on 4\" 2x10: forward and sideways  Standing TKEs with equal WB 10 secs x10 with ball on wall Bosu lunges 2x10 alternate  Lateral bosu lunges 2x10  Step up on bosu forward and lateral 2x10  SLS 10 secs x5 Bosu lunges 2x10 alternate  Lateral bosu lunges 2x10  BLE mini squats 2x10 ball throw to trampoline 2x10 x4# med ball  SLS ball throws  2x10 2# med ball  Step down 4\" 2x10   Manual Therapy   Patella mobs x2 mins    Therapeutic Exercise Min 25 25 40 24   Neuro Re-Ed Min  15 10 24   Evaluation Min 20       Total of Timed Procedures 25 40 50 48   Total of Service Based 20 0 0 0   Total Treatment Time 45 40 50 48         HEP  See AVS  tab    Charges     exx2,theraex x2

## 2025-02-24 NOTE — TELEPHONE ENCOUNTER
Refill passed per [facility] protocol but not refilled yet due to high level interaction warning.     Requested Prescriptions   Pending Prescriptions Disp Refills    DICLOFENAC-MISOPROSTOL 50-0.2 MG Oral Tab EC [Pharmacy Med Name: DICLOFENAC-MISOPROST 50-0.2 MG] 180 tablet 1     Sig: TOME NEL TABLETA POR VIA ORAL DOS VECES AL FILIPE WITH MEALS       Non-Narcotic Pain Medication Protocol Passed - 2/24/2025  8:31 AM        Passed - In person appointment or virtual visit in the past 6 mos or appointment in next 3 mos     Recent Outpatient Visits              1 week ago     [facility] [place] Allie Finney, PT    Office Visit    2 weeks ago     [facility] [place] Allie Fin[facility], PT    Office Visit    2 weeks ago     [facility] [place] Allie Fin[facility], PT    Office Visit    1 month ago Controlled type 2 diabetes mellitus without complication, without long-term current use of insulin ([facility])    [facility] Arleen Paris APRN    Office Visit    1 month ago Primary osteoarthritis of right knee    [facility] [place] Britton Rodriguez MD    Office Visit          Future Appointments         Provider Department Appt Notes    In 1 month Carter Weir MD [facility]     In 2 months Arleen Paris APRN [facility] 4 months                    Passed - Medication is active on med list          LEVOTHYROXINE 137 MCG Oral Tab [Pharmacy Med Name: LEVOTHYROXINE 137 MCG TABLET] 90 tablet 3     Sig: Take 137 mcg by mouth before breakfast.       Thyroid Medication Protocol Passed - 2/24/2025  8:31 AM        Passed - TSH in past 12 months        Passed - Last TSH value is normal     Lab Results   Component Value Date    TSH 0.975 01/24/2025    THYROIDFUNC 2.63 06/11/2016    TSHT4 2.86 12/07/2019                  Passed - In person appointment or virtual visit in the past 12 mos or appointment in next 3 mos     Recent Outpatient Visits              1 week ago     [place]  [facility] [place]    Office Visit    2 weeks ago     [place]  [facility] [place]    Office Visit    2 weeks ago     [place]  [facility] [place] Allie Finney, PT    Office Visit    1 month ago Controlled type 2 diabetes mellitus without complication, without long-term current use of insulin (HCC)    [facility] Arleen [facility] APRN    Office Visit    1 month ago Primary osteoarthritis of right knee    [facility] Britton Rodriguez MD    Office Visit          Future Appointments         Provider Department Appt Notes    In 1 month Carter Weir MD [facility]     In 2 months Arleen Paris APRN [facility] 4 months                    Passed - Medication is active on med list             [unfilled]      [unfilled]

## 2025-03-01 RX ORDER — LEVOTHYROXINE SODIUM 137 UG/1
137 TABLET ORAL
Qty: 90 TABLET | Refills: 3 | Status: SHIPPED | OUTPATIENT
Start: 2025-03-01

## 2025-03-01 RX ORDER — DICLOFENAC SODIUM AND MISOPROSTOL 50; 200 MG/1; UG/1
1 TABLET, DELAYED RELEASE ORAL 2 TIMES DAILY WITH MEALS
Qty: 180 TABLET | Refills: 1 | Status: SHIPPED | OUTPATIENT
Start: 2025-03-01

## 2025-03-07 ENCOUNTER — TELEPHONE (OUTPATIENT)
Dept: ENDOCRINOLOGY CLINIC | Facility: CLINIC | Age: 50
End: 2025-03-07

## 2025-03-07 DIAGNOSIS — E11.9 CONTROLLED TYPE 2 DIABETES MELLITUS WITHOUT COMPLICATION, WITHOUT LONG-TERM CURRENT USE OF INSULIN (HCC): Primary | ICD-10-CM

## 2025-03-07 NOTE — TELEPHONE ENCOUNTER
Patient Turkmen speaking is having issues with dexcom and wondering if she needs a new one please call

## 2025-03-11 DIAGNOSIS — S83.241D ACUTE MEDIAL MENISCUS TEAR OF RIGHT KNEE, SUBSEQUENT ENCOUNTER: ICD-10-CM

## 2025-03-11 NOTE — TELEPHONE ENCOUNTER
Last office visit with endocrinology on 1/24/25.   Continue Mounjaro 15mg subcutaneous weekly. She has 3 weeks left of 12.5mg and will finish this first. Reviewed side effects and risks vs benefits of medication.   - Interested in trying Dexcom in place of Anthony due to alarms overnight for false lows- discussed possibility of compression lows with both sensors.      Bandar # [id number]  Called patient. No answer.  left message for patient to call back.   [employer] message sent.

## 2025-03-12 NOTE — TELEPHONE ENCOUNTER
: Jazmyne [id number]  Called patient X2. No answer. Voicemail box is full and  unable to leave voicemail. [employer] message sent.

## 2025-03-13 RX ORDER — ACYCLOVIR 400 MG/1
TABLET ORAL
Qty: 1 EACH | Refills: 0 | Status: SHIPPED | OUTPATIENT
Start: 2025-03-13 | End: 2025-04-10

## 2025-03-13 NOTE — TELEPHONE ENCOUNTER
Patient states that sensor has been extremely faulty since it was not compatible with update on her phone. Patient requesting that  be sent to pharmacy. Offered NV for further teaching. Patient declined at this time but will call if having issues connecting sensor.

## 2025-03-14 RX ORDER — ERGOCALCIFEROL 1.25 MG/1
50000 CAPSULE, LIQUID FILLED ORAL WEEKLY
Qty: 12 CAPSULE | Refills: 0 | Status: SHIPPED | OUTPATIENT
Start: 2025-03-14

## 2025-03-17 RX ORDER — PIOGLITAZONE 30 MG/1
30 TABLET ORAL DAILY
Qty: 90 TABLET | Refills: 1 | Status: SHIPPED | OUTPATIENT
Start: 2025-03-17

## 2025-04-03 DIAGNOSIS — M65.961 SYNOVITIS OF RIGHT KNEE: ICD-10-CM

## 2025-04-07 RX ORDER — GABAPENTIN 100 MG/1
100 CAPSULE ORAL 3 TIMES DAILY
Qty: 90 CAPSULE | Refills: 2 | Status: SHIPPED | OUTPATIENT
Start: 2025-04-07

## 2025-04-07 NOTE — TELEPHONE ENCOUNTER
Refill passes per [facility] protocol.    Future Appointments   Date Time Provider Department Center   4/12/2025  8:00 AM Carter Weir MD [facility]     Last office visit: 12/23/2024

## 2025-04-10 DIAGNOSIS — E11.9 CONTROLLED TYPE 2 DIABETES MELLITUS WITHOUT COMPLICATION, WITHOUT LONG-TERM CURRENT USE OF INSULIN (HCC): ICD-10-CM

## 2025-04-10 RX ORDER — ACYCLOVIR 400 MG/1
TABLET ORAL
Qty: 9 EACH | Refills: 0 | Status: SHIPPED | OUTPATIENT
Start: 2025-04-10

## 2025-04-10 NOTE — TELEPHONE ENCOUNTER
Endocrine Refill protocol for CGM supplies     Protocol Criteria:  PASSED Reason: N/A    If below requirement is met, send a 90-day supply with 1 refill per provider protocol.     Verify appointment with Endocrinology completed in the last 12 months or scheduled in the next 6 months     Last completed office visit:1/24/2025 Arleen Paris APRN   Next scheduled Follow up:   Future Appointments   Date Time Provider Department Center   4/12/2025  8:00 AM [facility]   5/16/2025  7:30 AM [facility]

## 2025-04-12 ENCOUNTER — OFFICE VISIT (OUTPATIENT)
Dept: FAMILY MEDICINE CLINIC | Facility: CLINIC | Age: 50
End: 2025-04-12

## 2025-04-12 ENCOUNTER — NURSE ONLY (OUTPATIENT)
Dept: INTERNAL MEDICINE CLINIC | Facility: CLINIC | Age: 50
End: 2025-04-12

## 2025-04-12 VITALS
DIASTOLIC BLOOD PRESSURE: 74 MMHG | SYSTOLIC BLOOD PRESSURE: 118 MMHG | TEMPERATURE: 97 F | WEIGHT: 230 LBS | HEART RATE: 69 BPM | BODY MASS INDEX: 36.96 KG/M2 | HEIGHT: 66 IN

## 2025-04-12 DIAGNOSIS — E11.9 TYPE 2 DIABETES MELLITUS WITHOUT COMPLICATION, WITHOUT LONG-TERM CURRENT USE OF INSULIN (HCC): ICD-10-CM

## 2025-04-12 DIAGNOSIS — M17.0 PRIMARY OSTEOARTHRITIS OF BOTH KNEES: Primary | ICD-10-CM

## 2025-04-12 DIAGNOSIS — Z12.31 ENCOUNTER FOR SCREENING MAMMOGRAM FOR MALIGNANT NEOPLASM OF BREAST: ICD-10-CM

## 2025-04-12 DIAGNOSIS — S83.241D ACUTE MEDIAL MENISCUS TEAR OF RIGHT KNEE, SUBSEQUENT ENCOUNTER: ICD-10-CM

## 2025-04-12 PROCEDURE — 99213 OFFICE O/P EST LOW 20 MIN: CPT | Performed by: FAMILY MEDICINE

## 2025-04-12 PROCEDURE — 90750 HZV VACC RECOMBINANT IM: CPT | Performed by: FAMILY MEDICINE

## 2025-04-12 PROCEDURE — 92229 IMG RTA DETC/MNTR DS POC ALY: CPT | Performed by: FAMILY MEDICINE

## 2025-04-12 PROCEDURE — 90471 IMMUNIZATION ADMIN: CPT | Performed by: FAMILY MEDICINE

## 2025-04-12 RX ORDER — IBUPROFEN 600 MG/1
600 TABLET, FILM COATED ORAL EVERY 6 HOURS PRN
Qty: 60 TABLET | Refills: 0 | Status: SHIPPED | OUTPATIENT
Start: 2025-04-12

## 2025-04-15 ENCOUNTER — LAB ENCOUNTER (OUTPATIENT)
Dept: LAB | Age: 50
End: 2025-04-15
Attending: FAMILY MEDICINE
Payer: COMMERCIAL

## 2025-04-15 LAB
ALBUMIN SERPL-MCNC: 4.5 G/DL (ref 3.2–4.8)
ALBUMIN/GLOB SERPL: 1.6 {RATIO} (ref 1–2)
ALP LIVER SERPL-CCNC: 77 U/L (ref 39–100)
ALT SERPL-CCNC: 24 U/L (ref 10–49)
ANION GAP SERPL CALC-SCNC: 7 MMOL/L (ref 0–18)
AST SERPL-CCNC: 24 U/L (ref ?–34)
BILIRUB SERPL-MCNC: 0.4 MG/DL (ref 0.3–1.2)
BUN BLD-MCNC: 14 MG/DL (ref 9–23)
BUN/CREAT SERPL: 24.1 (ref 10–20)
CALCIUM BLD-MCNC: 9.1 MG/DL (ref 8.7–10.4)
CHLORIDE SERPL-SCNC: 104 MMOL/L (ref 98–112)
CO2 SERPL-SCNC: 28 MMOL/L (ref 21–32)
CREAT BLD-MCNC: 0.58 MG/DL (ref 0.55–1.02)
CREAT UR-SCNC: 98.7 MG/DL
EGFRCR SERPLBLD CKD-EPI 2021: 110 ML/MIN/1.73M2 (ref 60–?)
FASTING STATUS PATIENT QL REPORTED: YES
GLOBULIN PLAS-MCNC: 2.9 G/DL (ref 2–3.5)
GLUCOSE BLD-MCNC: 84 MG/DL (ref 70–99)
MICROALBUMIN UR-MCNC: 0.7 MG/DL
MICROALBUMIN/CREAT 24H UR-RTO: 7.1 UG/MG (ref ?–30)
OSMOLALITY SERPL CALC.SUM OF ELEC: 288 MOSM/KG (ref 275–295)
POTASSIUM SERPL-SCNC: 4.2 MMOL/L (ref 3.5–5.1)
PROT SERPL-MCNC: 7.4 G/DL (ref 5.7–8.2)
SODIUM SERPL-SCNC: 139 MMOL/L (ref 136–145)

## 2025-04-15 PROCEDURE — 36415 COLL VENOUS BLD VENIPUNCTURE: CPT | Performed by: FAMILY MEDICINE

## 2025-04-15 PROCEDURE — 82570 ASSAY OF URINE CREATININE: CPT | Performed by: FAMILY MEDICINE

## 2025-04-15 PROCEDURE — 82043 UR ALBUMIN QUANTITATIVE: CPT | Performed by: FAMILY MEDICINE

## 2025-04-15 PROCEDURE — 80053 COMPREHEN METABOLIC PANEL: CPT | Performed by: FAMILY MEDICINE

## 2025-04-16 NOTE — PROGRESS NOTES
Patient is here for DM retina camera eye exam. Verified full name, , and active order for diabetic retinopathy exam OU. DM eye exam completed. Patient advised of PCP will review eye exam results. Patient verbalized understanding

## 2025-05-01 DIAGNOSIS — M17.0 PRIMARY OSTEOARTHRITIS OF BOTH KNEES: ICD-10-CM

## 2025-05-05 RX ORDER — IBUPROFEN 600 MG/1
600 TABLET, FILM COATED ORAL EVERY 6 HOURS PRN
Qty: 60 TABLET | Refills: 0 | Status: SHIPPED | OUTPATIENT
Start: 2025-05-05

## 2025-05-05 NOTE — TELEPHONE ENCOUNTER
Refill passed [facility] protocol.     Please review due to High Drug-Drug: ibuprofen and escitalopram Toxic effects may be increased with concurrent administration of ibuprofen and Selective Serotonin Reuptake Inhibitors. The risk of upper gastrointestinal bleeding may be increased. Patients taking both drugs concurrently should be educated about the signs and symptoms of GI bleeding.

## 2025-05-16 ENCOUNTER — OFFICE VISIT (OUTPATIENT)
Dept: ENDOCRINOLOGY CLINIC | Facility: CLINIC | Age: 50
End: 2025-05-16

## 2025-05-16 VITALS
WEIGHT: 229 LBS | HEIGHT: 66 IN | HEART RATE: 74 BPM | SYSTOLIC BLOOD PRESSURE: 108 MMHG | BODY MASS INDEX: 36.8 KG/M2 | DIASTOLIC BLOOD PRESSURE: 75 MMHG

## 2025-05-16 DIAGNOSIS — E11.9 CONTROLLED TYPE 2 DIABETES MELLITUS WITHOUT COMPLICATION, WITHOUT LONG-TERM CURRENT USE OF INSULIN (HCC): Primary | ICD-10-CM

## 2025-05-16 LAB
GLUCOSE BLOOD: 102
HEMOGLOBIN A1C: 5.3 % (ref 4.3–5.6)
TEST STRIP LOT #: NORMAL NUMERIC

## 2025-05-16 PROCEDURE — 99214 OFFICE O/P EST MOD 30 MIN: CPT

## 2025-05-16 PROCEDURE — 83036 HEMOGLOBIN GLYCOSYLATED A1C: CPT

## 2025-05-16 PROCEDURE — 82947 ASSAY GLUCOSE BLOOD QUANT: CPT

## 2025-05-16 RX ORDER — SEMAGLUTIDE 2.68 MG/ML
2 INJECTION, SOLUTION SUBCUTANEOUS WEEKLY
Qty: 1 EACH | Refills: 1 | Status: SHIPPED | OUTPATIENT
Start: 2025-05-16

## 2025-05-16 NOTE — PROGRESS NOTES
Name: Eva Beasley  Date: 5/16/25    Referring Physician: No ref. provider found    HISTORY OF PRESENT ILLNESS     Eva Beasley is a 50 year old female who presents for follow up for diabetes mellitus and hypothyroidism.    Diagnosed with diabetes 2-3 years ago after having prediabetes for 8-9 years prior. In the last several months she has made significant diet changes and has increased exercise. She has been able to lose 30lbs. Was on several oral medications but then transitioned to Mounjaro and is now maintained on weekly GLP-1 agonist alone.     Has not been exercising due to knee injury in the last few months and admits to some nonadherence with diet with recent increased stress.     Diabetes History:  Diagnosed- 7/2022; prediabetes 8 years ago and then progressed to DM type 2 two years ago.   Patient has not had hospitalizations for blood sugar issues    Prior glycohemoglobin were 6.0% 3/2024; 5.6% 1/2025; 5.3% POC today   Glucose in clinic today - 102 mg/dl     Dietary compliance: Good- Follows low CHO diet, eats three meals daily. Avoids bread, tortillas, chips and other high CHO foods. Admits to more cheating with bread/tortillas over Ag holiday but since holidays has been more diligent. Breakfast- eggs with bread, coffee and fruit. Lunch/Dinner- steak, chicken - 2-3 tortillas recently and some increased chocolate.     Exercise: Yes- riding stationary bike 15-30 minutes daily 2 twice daily. Some walking but limited with knee pain     Polyuria/polydipsia: No  Blurred vision: No     Episodes of hypoglycemia: No  Blood Glucose:  Checking glucose 3 times daily  Using Freestyle Anthony   Reviewed data for 30 days prior to visit date:    98% of glucose readings in target range   2% of glucose readings below target range   0% above target range     GMI- 5.7%     Previous DM therapies:  - Actos - stopped when started on GLP-1 agonist   - Metformin - GI side  effects - abdominal pain     Current DM Regimen:  Mounjaro 15 mg subcutaneous weekly - tolerating medication     Modifying factors:  Medication adherence: Yes   Recent steroids, illness or infections: No       REVIEW OF SYSTEMS  Eyes: Diabetic retinopathy present: No            Most recent visit to eye doctor in last 12 months: Yes- 1/2025     CV: Cardiovascular disease present: No         Hypertension present: No, previously on medication but stopped with weight loss in the last year.          Hyperlipidemia present: No         Peripheral Vascular Disease present: No    : Nephropathy present: No    Neuro: Neuropathy present: No- denies     Skin: Infection or ulceration: No    Osteoporosis: No    Thyroid disease: Yes; diagnosed with hypothyroidism 20 years ago. Now maintained on Levothyroxine 137 mcg PO daily. Normal TFT's 10/2024. Does note difficulty with weight loss, although has been able to lose weight with addition of Mounjaro. + fatigue and constipation as well.       Medications:     Current Outpatient Medications:     semaglutide (OZEMPIC, 2 MG/DOSE,) 8 MG/3ML Subcutaneous Solution Pen-injector, Inject 2 mg into the skin once a week., Disp: 1 each, Rfl: 1    Tirzepatide (MOUNJARO) 15 MG/0.5ML Subcutaneous Solution Pen-injector, Inject 15 mg into the skin once a week., Disp: 2 mL, Rfl: 4    ibuprofen 600 MG Oral Tab, Take 1 tablet (600 mg total) by mouth every 6 (six) hours as needed for Pain. Always take it with food., Disp: 60 tablet, Rfl: 0    Continuous Glucose  (DEXCOM G7 ) Does not apply Device, USE WITH DEXCOM G7 SENSOR, Disp: 9 each, Rfl: 0    gabapentin 100 MG Oral Cap, Take 1 capsule (100 mg total) by mouth 3 (three) times daily., Disp: 90 capsule, Rfl: 2    ergocalciferol 1.25 MG (49312 UT) Oral Cap, Take 1 capsule (50,000 Units total) by mouth once a week., Disp: 12 capsule, Rfl: 0    LEVOTHYROXINE 137 MCG Oral Tab, TAKE 137 MCG BY MOUTH BEFORE BREAKFAST., Disp: 90 tablet, Rfl:  3    Continuous Glucose Sensor (DEXCOM G7 SENSOR) Does not apply Misc, 1 each Every 10 days. Use as directed every 10 days, Disp: 3 each, Rfl: 5    atorvastatin 40 MG Oral Tab, Take 1 tablet (40 mg total) by mouth nightly., Disp: 90 tablet, Rfl: 1    montelukast 10 MG Oral Tab, Take 1 tablet (10 mg total) by mouth nightly., Disp: 90 tablet, Rfl: 2    escitalopram 10 MG Oral Tab, Take 1 tablet (10 mg total) by mouth every morning., Disp: 90 tablet, Rfl: 3    Loratadine-Pseudoephedrine (EQL ALLERGY/CONGESTION RELIEF OR), Take 1 tablet by mouth Q12H., Disp: , Rfl:     pantoprazole 40 MG Oral Tab EC, Take 1 tablet (40 mg total) by mouth daily., Disp: 30 tablet, Rfl: 3    fluticasone propionate 50 MCG/ACT Nasal Suspension, 1 spray by Nasal route 2 (two) times daily., Disp: 16 g, Rfl: 3    simethicone 125 MG Oral Chew Tab, Chew 2 tablets (250 mg total) by mouth every 6 (six) hours as needed for FLATULENCE., Disp: 60 tablet, Rfl: 1     Allergies:   Allergies   Allergen Reactions    Contrave [Naltrexone-Bupropion Hcl Er] DIZZINESS and OTHER (SEE COMMENTS)     Headaches     Victoza OTHER (SEE COMMENTS)     Abdominal pain       Social History:   Social History     Socioeconomic History    Marital status:    Tobacco Use    Smoking status: Never     Passive exposure: Never    Smokeless tobacco: Never   Vaping Use    Vaping status: Never Used   Substance and Sexual Activity    Alcohol use: No     Alcohol/week: 0.0 standard drinks of alcohol    Drug use: No     Comment: No history of illicit substance abuse       Medical History:   Past Medical History:    Diabetes (HCC)    Hypothyroidism    Polycystic ovarian disease    Rheumatic fever       Surgical history:   Past Surgical History:   Procedure Laterality Date    Appendectomy      Colonoscopy N/A 10/28/2023    Procedure: COLONOSCOPY;  Surgeon: Ollie Whalen MD;  [facility]         PHYSICAL EXAM  Vitals:    05/16/25 0739   BP: 108/75   Pulse: 74    Weight: 229 lb (103.9 kg)   Height: 5' 6\" (1.676 m)         General Appearance:  alert, well developed, in no acute distress  Eyes:  normal conjunctivae, sclera, and normal pupils  Neck: Trachea midline: Normal  Back: no kyphosis or back tenderness  Lymph Nodes:  No abnormal nodes noted  Musculoskeletal:  normal muscle strength and tone  Skin:  normal moisture and skin texture  Hair & Nails:  normal scalp hair     Hematologic:  no excessive bruising  Neuro:  sensory grossly intact and motor grossly intact.   Psychiatric:  oriented to time, self, and place  Nutritional:  no abnormal weight gain or loss      ASSESSMENT/PLAN:    Diabetes mellitus type 2 controlled  - HgA1c- 5.3%  -Congratulated patient on improved diet and exercise and weight loss in the last year.   -Reviewed ABC's of diabetes   - Reviewed pathogenesis of diabetes.   - Reviewed importance of good glycemic control to prevent microvascular and macrovascular complications including nephropathy, neuropathy, retinopathy, and cardiovascular disease.  - Reviewed importance of SBGM- check glucose 1-3 times daily   - Reviewed target glucose goals for patient  fasting and <180 post prandially   - Reviewed importance of following diabetic diet- recommended 135 grams of CHO per day or 45 grams per meal.   - Provided patient education materials    - Change Mounjaro to Ozempic 2mg subcutaneous weekly. Reviewed side effects and risks vs benefits of medication.     -Continue CGM    - normotensive   - no nephropathy- last lab 1/2025  - Lipids 1/2025- LDL above goal- admits she was not taking statin daily- was missing several doses- now improved adherence- recheck lipids in 3 months   - UTD with optho- last optho visit 1/2025  - normal foot exam    Hypothyroidism  - TFT's normal 1/2025  -Currently maintained on Levothyroxine 137 mcg daily     RTC in 4 months   5/16/25  YOANDY Lind    A total of  35 minutes was spent on obtaining history, reviewing  pertinent imaging/labs and specialists notes, evaluating patient, providing multiple treatment options, reinforcing diet/exercise and compliance, and completing documentation.

## 2025-05-16 NOTE — PROGRESS NOTES
-----------------------------  Dexcom Clarity  -----------------------------  Eva hilton    YOB: 1975    Generated at: Fri, May 16, 2025 7:52 AM CDT    Reporting period: Thu Apr 17, 2025 - Fri May 16, 2025  -----------------------------  Glucose Details    Average glucose: 102 mg/dL    GMI: 5.7%    Standard deviation: 16 mg/dL    Coefficient of Variation: 15.7%  -----------------------------  Time in Range    Very High: 0%    High: 0%    In Range: 99%    Low: 1%    Very Low: <1%    Target Range   mg/dL    -----------------------------  Sensor usage    Days with data: 29/30    Time active: 97%    Avg. calibrations per day: 0.2

## 2025-06-08 DIAGNOSIS — S83.241D ACUTE MEDIAL MENISCUS TEAR OF RIGHT KNEE, SUBSEQUENT ENCOUNTER: ICD-10-CM

## 2025-06-09 RX ORDER — ERGOCALCIFEROL 1.25 MG/1
50000 CAPSULE, LIQUID FILLED ORAL WEEKLY
Qty: 12 CAPSULE | Refills: 0 | Status: SHIPPED | OUTPATIENT
Start: 2025-06-09

## 2025-06-09 NOTE — TELEPHONE ENCOUNTER
Please Review. Protocol Failed; No Protocol   Recent Labs:  Lab Results   Component Value Date    VITD 51.2 10/19/2024     Requested Prescriptions   Pending Prescriptions Disp Refills    ERGOCALCIFEROL 1.25 MG (22568 UT) Oral Cap [Pharmacy Med Name: VITAMIN D2 1.25MG(50,000 UNIT)] 12 capsule 0     Sig: TOME 1 CAPSULA POR VIA ORAL NEL VEZ POR SEMANA       There is no refill protocol information for this order

## 2025-06-12 ENCOUNTER — TELEPHONE (OUTPATIENT)
Dept: ENDOCRINOLOGY CLINIC | Facility: CLINIC | Age: 50
End: 2025-06-12

## 2025-06-12 DIAGNOSIS — E11.9 CONTROLLED TYPE 2 DIABETES MELLITUS WITHOUT COMPLICATION, WITHOUT LONG-TERM CURRENT USE OF INSULIN (HCC): Primary | ICD-10-CM

## 2025-06-12 RX ORDER — TIRZEPATIDE 15 MG/.5ML
15 INJECTION, SOLUTION SUBCUTANEOUS WEEKLY
Qty: 6 ML | Refills: 1 | Status: SHIPPED | OUTPATIENT
Start: 2025-06-12

## 2025-06-12 NOTE — TELEPHONE ENCOUNTER
Patient states she is having Nausea and headaches, besided her blood glucose being higher than hat it was with Mounjaro.    Has been taking it for 2 weeks now - 3rd dose will be on Saturday.    Per Last Office Visit (5/16/2025): \"Mounjaro 15 mg subcutaneous weekly - tolerating medication \"    Provider,  Patient wants to go back to Mounjaro.  Please advise,  Thank you.

## 2025-06-12 NOTE — TELEPHONE ENCOUNTER
The patient called to speak with a nurse in regards to the Ozempic. The patient states that she has been feeling nauseous and states that she has been having headaches, the patient would like to switch back to the Mounjaro.

## 2025-06-16 ENCOUNTER — OFFICE VISIT (OUTPATIENT)
Dept: FAMILY MEDICINE CLINIC | Facility: CLINIC | Age: 50
End: 2025-06-16
Payer: COMMERCIAL

## 2025-06-16 VITALS
HEIGHT: 66 IN | SYSTOLIC BLOOD PRESSURE: 139 MMHG | HEART RATE: 88 BPM | DIASTOLIC BLOOD PRESSURE: 84 MMHG | BODY MASS INDEX: 37.28 KG/M2 | WEIGHT: 232 LBS

## 2025-06-16 DIAGNOSIS — B34.9 ACUTE VIRAL SYNDROME: Primary | ICD-10-CM

## 2025-06-16 PROCEDURE — 99213 OFFICE O/P EST LOW 20 MIN: CPT | Performed by: FAMILY MEDICINE

## 2025-06-16 RX ORDER — BENZONATATE 200 MG/1
200 CAPSULE ORAL 3 TIMES DAILY PRN
Qty: 30 CAPSULE | Refills: 0 | Status: SHIPPED | OUTPATIENT
Start: 2025-06-16

## 2025-06-16 NOTE — PROGRESS NOTES
6/16/2025  5:07 PM    Eva Beasley is a 50 year old female.    Chief complaint(s):   Chief Complaint   Patient presents with    Upper Respiratory Infection     URI x 4 days, Cough, Irritated throat, fever, sweats, vomiting, chest congestion, mucous build up, sinus pain/pressure,     Dizziness     C/o headaches and dizziness      HPI:     Eva Beasley primary complaint is regarding URI symptoms.     Patient is a 50-year-old female presents complaining of URI symptoms for the past 4 days.  Symptoms primarily include a dry cough, nasal congestion, minimal irritation of the throat, fever and sweats.  No vomiting or diarrhea but positive for nausea.  Denies any headaches and there has been no shortness of breath.      HISTORY:  Past Medical History[1]   Past Surgical History[2]   Family History[3]   Social History: Short Social Hx on File[4]     Immunizations:   Immunization History   Administered Date(s) Administered    FLULAVAL 6 months & older 0.5 ml Prefilled syringe (38088) 12/04/2017, 11/02/2018, 10/22/2019, 10/23/2021, 01/14/2023    FLUZONE 6 months and older PFS 0.5 ml ([id number]) 10/01/2016, 12/04/2017, 11/02/2018, 12/16/2023    Flulaval, 3 Years & >, IM 10/16/2007, 09/28/2010    Fluvirin, 3 Years & >, Im 12/13/2005, 11/02/2006, 11/06/2012    Influenza 11/11/2008, 10/10/2009, 09/23/2014, 09/16/2015    Influenza Vaccine, trivalent (IIV3), PF 0.5mL ([id number]) 10/19/2024    Influenza Virus Vaccine, H1N1 12/19/2009    TD 02/29/2004    TDAP 07/25/2013, 01/14/2023    Zoster Vaccine Recombinant Adjuvanted (Shingrix) 04/12/2025       Medications (Active prior to today's visit):  Current Medications[5]    Allergies:  Allergies[6]      ROS:   Review of Systems   Constitutional:  Positive for chills and diaphoresis. Negative for appetite change and fever.   HENT:  Positive for ear pain (right), rhinorrhea, sinus pressure and sore throat.    Eyes:  Negative for visual  disturbance.   Respiratory:  Positive for cough. Negative for shortness of breath.    Cardiovascular:  Negative for chest pain.   Gastrointestinal:  Positive for nausea. Negative for abdominal pain, diarrhea and vomiting.   Musculoskeletal:  Negative for back pain.   Skin:  Negative for rash.   Neurological:  Negative for dizziness and headaches.       PHYSICAL EXAM:   VS: /84 (BP Location: Right arm, Patient Position: Sitting, Cuff Size: adult)   Pulse 88   Ht 5' 6\" (1.676 m)   Wt 232 lb (105.2 kg)   LMP 05/16/2025 (Approximate)   BMI 37.45 kg/m²     Physical Exam  Vitals reviewed.   Constitutional:       General: She is not in acute distress.     Appearance: Normal appearance.   HENT:      Head: Normocephalic.      Right Ear: Tympanic membrane and ear canal normal.      Left Ear: Tympanic membrane and ear canal normal.      Nose: Rhinorrhea present.      Mouth/Throat:      Pharynx: Oropharynx is clear.   Eyes:      Conjunctiva/sclera: Conjunctivae normal.   Cardiovascular:      Rate and Rhythm: Normal rate and regular rhythm.   Pulmonary:      Effort: Pulmonary effort is normal.      Breath sounds: Normal breath sounds.   Musculoskeletal:      Cervical back: Neck supple.   Skin:     Findings: No rash.   Psychiatric:         Mood and Affect: Mood normal.         LABORATORY RESULTS:       EKG / Spirometry : -     Radiology: No results found.     ASSESSMENT/PLAN:   Assessment   Encounter Diagnosis   Name Primary?    Acute viral syndrome Yes       MEDICATIONS:     Requested Prescriptions     Signed Prescriptions Disp Refills    benzonatate 200 MG Oral Cap 30 capsule 0     Sig: Take 1 capsule (200 mg total) by mouth 3 (three) times daily as needed.             LABORATORY & ORDERS:   Orders Placed This Encounter   Procedures    SARS-CoV-2/Flu A and B/RSV by PCR (Sera) [E] *Collect in Office!        RECOMMENDATIONS given include: Patient was reassured of  her medical condition and all questions and concerns  were answered. Patient was informed to please, call our office with any new or further questions or concerns that may come up in the near future. Notify Dr Hunter or the [facility] if there is a deterioration or worsening of the medical condition. Also, inform the doctor with any new symptoms or medications' side effects.      FOLLOW-UP: Schedule a follow-up visit in  prn.            Orders This Visit:  Orders Placed This Encounter   Procedures    SARS-CoV-2/Flu A and B/RSV by PCR (Sera) [E] *Collect in Office!       Meds This Visit:  Requested Prescriptions     Signed Prescriptions Disp Refills    benzonatate 200 MG Oral Cap 30 capsule 0     Sig: Take 1 capsule (200 mg total) by mouth 3 (three) times daily as needed.       Imaging & Referrals:  None         BRANDEN HUNTER MD       [1]   Past Medical History:   Diabetes (HCC)    Hypothyroidism    Polycystic ovarian disease    Rheumatic fever   [2]   Past Surgical History:  Procedure Laterality Date    Appendectomy      Colonoscopy N/A 10/28/2023    Procedure: COLONOSCOPY;  Surgeon: Ollie Whalen MD;  Location: [facility] ENDOSCOPY   [3]   Family History  Problem Relation Age of Onset    Circulatory Problems Mother         Peripheral vascular disease    Cancer Paternal Grandfather         Liver cancer     Alcohol and Other Disorders Associated Paternal Grandfather         Alcoholism    Diabetes Paternal Grandmother         Type 2   [4]   Social History  Socioeconomic History    Marital status:    Tobacco Use    Smoking status: Never     Passive exposure: Never    Smokeless tobacco: Never   Vaping Use    Vaping status: Never Used   Substance and Sexual Activity    Alcohol use: No     Alcohol/week: 0.0 standard drinks of alcohol    Drug use: No     Comment: No history of illicit substance abuse   [5]   Current Outpatient Medications   Medication Sig Dispense Refill    benzonatate 200 MG Oral Cap Take 1 capsule (200 mg total) by mouth 3 (three)  times daily as needed. 30 capsule 0    Tirzepatide (MOUNJARO) 15 MG/0.5ML Subcutaneous Solution Auto-injector Inject 15 mg into the skin once a week. (Patient not taking: Reported on 6/16/2025) 6 mL 1    ergocalciferol 1.25 MG (95115 UT) Oral Cap Take 1 capsule (50,000 Units total) by mouth once a week. 12 capsule 0    semaglutide (OZEMPIC, 2 MG/DOSE,) 8 MG/3ML Subcutaneous Solution Pen-injector Inject 2 mg into the skin once a week. 1 each 1    ibuprofen 600 MG Oral Tab Take 1 tablet (600 mg total) by mouth every 6 (six) hours as needed for Pain. Always take it with food. 60 tablet 0    Continuous Glucose  (DEXCOM G7 ) Does not apply Device USE WITH DEXCOM G7 SENSOR 9 each 0    gabapentin 100 MG Oral Cap Take 1 capsule (100 mg total) by mouth 3 (three) times daily. 90 capsule 2    LEVOTHYROXINE 137 MCG Oral Tab TAKE 137 MCG BY MOUTH BEFORE BREAKFAST. 90 tablet 3    Continuous Glucose Sensor (DEXCOM G7 SENSOR) Does not apply Misc 1 each Every 10 days. Use as directed every 10 days 3 each 5    atorvastatin 40 MG Oral Tab Take 1 tablet (40 mg total) by mouth nightly. 90 tablet 1    montelukast 10 MG Oral Tab Take 1 tablet (10 mg total) by mouth nightly. 90 tablet 2    escitalopram 10 MG Oral Tab Take 1 tablet (10 mg total) by mouth every morning. 90 tablet 3    Loratadine-Pseudoephedrine (EQL ALLERGY/CONGESTION RELIEF OR) Take 1 tablet by mouth Q12H.      pantoprazole 40 MG Oral Tab EC Take 1 tablet (40 mg total) by mouth daily. 30 tablet 3    fluticasone propionate 50 MCG/ACT Nasal Suspension 1 spray by Nasal route 2 (two) times daily. 16 g 3    simethicone 125 MG Oral Chew Tab Chew 2 tablets (250 mg total) by mouth every 6 (six) hours as needed for FLATULENCE. 60 tablet 1   [6]   Allergies  Allergen Reactions    Contrave [Naltrexone-Bupropion Hcl Er] DIZZINESS and OTHER (SEE COMMENTS)     Headaches     Victoza OTHER (SEE COMMENTS)     Abdominal pain

## 2025-06-17 ENCOUNTER — LAB ENCOUNTER (OUTPATIENT)
Dept: LAB | Age: 50
End: 2025-06-17
Attending: FAMILY MEDICINE
Payer: COMMERCIAL

## 2025-06-17 DIAGNOSIS — B34.9 ACUTE VIRAL SYNDROME: ICD-10-CM

## 2025-06-17 PROCEDURE — 87637 SARSCOV2&INF A&B&RSV AMP PRB: CPT

## 2025-07-07 RX ORDER — ATORVASTATIN CALCIUM 40 MG/1
TABLET, FILM COATED ORAL
Qty: 90 TABLET | Refills: 1 | Status: SHIPPED | OUTPATIENT
Start: 2025-07-07

## 2025-07-07 NOTE — TELEPHONE ENCOUNTER
Endocrine refill protocol for lipid lowering medications    Protocol Criteria:  PASSED Reason: N/A    If all below requirements are met, send a 90-day supply with 1 refill per provider protocol.    Verify appointment with Endocrinology completed in the last 6 months or scheduled in the next 3 months.  Lipid panel must have been completed in the last 12 months   ALT result below 80  LDL result below 130    Last completed office visit:5/16/2025 Arleen Paris APRN   Last completed telemed visit: Visit date not found  Next scheduled Follow up:   Future Appointments   Date Time Provider Department Center   9/19/2025  8:15 AM Arleen Paris APRN [facility]             Last Lipid panel date: Cholesterol: 181, done on 1/24/2025.  HDL Cholesterol: 61, done on 1/24/2025.  TriGlycerides 74, done on 1/24/2025.  LDL Cholesterol: 106, done on 1/24/2025.     Last ALT result: Last ALT was 24 done on 4/15/2025.  Last AST was 24 done on 4/15/2025.

## 2025-07-08 RX ORDER — ESCITALOPRAM OXALATE 10 MG/1
10 TABLET ORAL EVERY MORNING
Qty: 90 TABLET | Refills: 3 | Status: SHIPPED | OUTPATIENT
Start: 2025-07-08

## 2025-07-08 NOTE — TELEPHONE ENCOUNTER
REFILL PASSED PER [facility] PROTOCOLS       Please review pended refill request as unable to refill due to high/very high drug interaction warning copied here;          High  Drug-Drug: ibuprofen and escitalopramToxic effects may be increased with concurrent administration of ibuprofen and Selective Serotonin Reuptake Inhibitors. The risk of upper gastrointestinal bleeding may be increased. Patients taking both drugs concurrently should be educated about the signs and symptoms of GI bleeding.  Details

## 2025-08-09 DIAGNOSIS — S83.241D ACUTE MEDIAL MENISCUS TEAR OF RIGHT KNEE, SUBSEQUENT ENCOUNTER: ICD-10-CM

## 2025-08-12 ENCOUNTER — ORDER TRANSCRIPTION (OUTPATIENT)
Dept: SLEEP CENTER | Age: 50
End: 2025-08-12

## 2025-08-12 DIAGNOSIS — E66.9 OBESITY: ICD-10-CM

## 2025-08-12 DIAGNOSIS — R06.83 SNORING: Primary | ICD-10-CM

## 2025-08-12 DIAGNOSIS — E11.9 DIABETES MELLITUS (HCC): ICD-10-CM

## 2025-08-12 RX ORDER — MONTELUKAST SODIUM 10 MG/1
TABLET ORAL
Qty: 90 TABLET | Refills: 3 | Status: SHIPPED | OUTPATIENT
Start: 2025-08-12

## (undated) DEVICE — Device: Brand: DUAL NARE NASAL CANNULAE FEMALE LUER CON 7FT O2 TUBE

## (undated) DEVICE — MEDI-VAC NON-CONDUCTIVE SUCTION TUBING 6MM X 1.8M (6FT.) L: Brand: CARDINAL HEALTH

## (undated) DEVICE — TRAP POLYP W/ 2 SPEC TY CLR MAGNIFYING WIND

## (undated) DEVICE — KIT ENDO ORCAPOD 160/180/190

## (undated) DEVICE — 60 ML SYRINGE REGULAR TIP: Brand: MONOJECT

## (undated) DEVICE — SNARE ENDOSCOPIC 10MM ROUND

## (undated) DEVICE — KIT CLEAN ENDOKIT 1.1OZ GOWNX2

## (undated) DEVICE — STERILE LATEX POWDER-FREE SURGICAL GLOVESWITH NITRILE COATING: Brand: PROTEXIS

## (undated) NOTE — MR AVS SNAPSHOT
[address]  [address] [facility]  [phone]               Thank you for choosing us for your health care visit with Katlin Pederson MD.  We are glad to serve you and happy to provide you with this summ TAKE ONE TABLET BY MOUTH ONCE DAILY BEFORE BREAKFAST   Commonly known as:  SYNTHROID, LEVOTHROID           naproxen 500 MG Tabs   Take 1 tablet (500 mg total) by mouth 2 (two) times daily with meals.    Commonly known as:  NAPROSYN           * [facility] office, you can view your past visit information in [employer] by going to Visits < Visit Summaries. Dsg.nr questions? Call [phone] for help. Dsg.nr is NOT to be used for urgent needs. For medical emergencies, dial 911.         Educational Inform Tips for increasing your physical activity – Adults who are physically active are less likely to develop some chronic diseases than adults who are inactive.      HOW TO GET STARTED: HOW TO STAY MOTIVATED:   Start activities slowly and build up over time Do

## (undated) NOTE — LETTER
AUTHORIZATION FOR SURGICAL OPERATION OR OTHER PROCEDURE    1. I hereby authorize Dr. Emigdio Waters MD and [facility] staff assigned to my case to perform the following operation and/or procedure at the [facility] site:    _______________________________________________________________________________________________    ENDOMETRIAL BIOPSY  _______________________________________________________________________________________________    2.  My physician has explained the nature and purpose of the operation or other procedure, possible alternative methods of treatment, the risks involved, and the possibility of complication to me.  I acknowledge that no guarantee has been made as to the result that may be obtained.  3.  I recognize that, during the course of this operation, or other procedure, unforseen conditions may necessitate additional or different procedure than those listed above.  I, therefore, further authorize and request that the above named physician, his/her physician assistants or designees perform such procedures as are, in his/her professional opinion, necessary and desirable.  4.  Any tissue or organs removed in the operation or other procedure may be disposed of by and at the discretion of the [facility] and [facility].  5.  I understand that in the event of a medical emergency, I will be transported by local paramedics to [facility] or other hospital emergency department.  6.  I certify that I have read and fully understand the above consent to operation and/or other procedure.    7.  I acknowledge that my physician has explained sedation/analgesia administration to me including the risks and benefits.  I consent to the administration of sedation/analgesia as may be necessary or desirable in the judgement of my physician.    Witness signature: ___________________________________________________ Date:  ______/______/_____                     Time:  ________ A.M.  P.M.       Patient Name:  ______________________________________________________  (please print)      Patient signature:  ___________________________________________________             Relationship to Patient:           []  Parent    Responsible person                          []  Spouse  In case of minor or                    [] Other  _____________   Incompetent name:  __________________________________________________                               (please print)      _____________      Responsible person  In case of minor or  Incompetent signature:  _______________________________________________    Statement of Physician  My signature below affirms that prior to the time of the procedure, I have explained to the patient and/or his/her guardian, the risks and benefits involved in the proposed treatment and any reasonable alternative to the proposed treatment.  I have also explained the risks and benefits involved in the refusal of the proposed treatment and have answered the patient's questions.                        Date:  ______/______/_______  Provider                      Signature:  __________________________________________________________       Time:  ___________ A.M    P.M.

## (undated) NOTE — LETTER
09/29/20        Eva [address] [facility]  [address]  [place] [address]      Dear Milly Centeno records indicate that you have outstanding lab work and or testing that was ordered for you and has not yet been completed:

## (undated) NOTE — LETTER
AUTHORIZATION FOR SURGICAL OPERATION OR OTHER PROCEDURE    1. I hereby authorize Dr. Weir, and [facility] staff assigned to my case to perform the following operation and/or procedure at the [facility] site:    _______________________________________________________________________________________________      _______________________________________________________________________________________________    2.  My physician has explained the nature and purpose of the operation or other procedure, possible alternative methods of treatment, the risks involved, and the possibility of complication to me.  I acknowledge that no guarantee has been made as to the result that may be obtained.  3.  I recognize that, during the course of this operation, or other procedure, unforseen conditions may necessitate additional or different procedure than those listed above.  I, therefore, further authorize and request that the above named physician, his/her physician assistants or designees perform such procedures as are, in his/her professional opinion, necessary and desirable.  4.  Any tissue or organs removed in the operation or other procedure may be disposed of by and at the discretion of the [facility] and [facility].  5.  I understand that in the event of a medical emergency, I will be transported by local paramedics to [facility] or other hospital emergency department.  6.  I certify that I have read and fully understand the above consent to operation and/or other procedure.    7.  I acknowledge that my physician has explained sedation/analgesia administration to me including the risks and benefits.  I consent to the administration of sedation/analgesia as may be necessary or desirable in the judgement of my physician.    Witness signature: ___________________________________________________ Date:  ______/______/_____                    Time:  ________ A.M.   P.M.       Patient Name:  ______________________________________________________  (please print)      Patient signature:  ___________________________________________________             Relationship to Patient:           []  Parent    Responsible person                          []  Spouse  In case of minor or                    [] Other  _____________   Incompetent name:  __________________________________________________                               (please print)      _____________      Responsible person  In case of minor or  Incompetent signature:  _______________________________________________    Statement of Physician  My signature below affirms that prior to the time of the procedure, I have explained to the patient and/or his/her guardian, the risks and benefits involved in the proposed treatment and any reasonable alternative to the proposed treatment.  I have also explained the risks and benefits involved in the refusal of the proposed treatment and have answered the patient's questions.                        Date:  ______/______/_______  Provider                      Signature:  __________________________________________________________       Time:  ___________ A.M    P.M.

## (undated) NOTE — MR AVS SNAPSHOT
[address]  [address] [facility]  [phone]               Thank you for choosing us for your health care visit with Savannah Torrez MD.  We are glad to serve you and happy to provide you with this summ 2.4mg for the 4th week , then increase to 3.0mg on the 5th week and remain at 3.0 mg SQ every night.    Commonly known as:  SAXENDA           Naltrexone-Bupropion HCl ER 8-90 MG Tb12   Take 1 tab po daily for 1 week, then          1 tab po BID for 1 weeks,

## (undated) NOTE — LETTER
December 19, 2018     [address]  [facility] [address]      Dear Emily Herb:    Below are the results from your recent visit: the following results are within normal limits:  TFTs, recheck in 6 months.      Resul

## (undated) NOTE — LETTER
12/04/19        Eva [address] [facility]  [address]  [facility] [address]      Dear Елена Valdes records indicate that you have outstanding lab work and or testing that was ordered for you and has not yet been completed:

## (undated) NOTE — LETTER
Sebastien Cevallos Md  [address] [facility] [place] [facility] [address]  [place],  [address]       11/30/23        Patient: Gilles Santana   YOB: 1975   Date of Visit: 11/30/2023       Dear  Dr. Kathi Batres MD,      Thank you for referring Gilles Santana to my practice. Please find my assessment and plan below. ASSESSMENT AND PLAN    1. Chronic cough  Chronic cough may be related to chronic postnasal discharge and perhaps even reflux. Start Claritin-D Singulair Astelin nasal spray as well as omeprazole. Endoscopy revealed erythema but no lesions or masses of the laryngeal structures. Return to see me in 1 month for reevaluation.  - LARYNGOSCOPY,FLEX FIBER,DIAGNOSTIC                 Sincerely,   Sarai Pino. Gallo Dang MD   [address] , [place]  [address] [facility]  [address] [address] [address]    Document electronically generated by:  Sarai Pino.  Gallo Dang MD

## (undated) NOTE — MR AVS SNAPSHOT
[address]  [address] [facility]  [phone]               Thank you for choosing us for your health care visit with Katlin Pederson MD.  We are glad to serve you and happy to provide you with this summ [facility] however, your insurance company may require you to have these services done at another facility or to obtain an approved referral. Services ordered by your physician may not be covered unless prior authorization is obtained in ac Doxycycline Monohydrate 100 MG Tabs   Take 100 mg by mouth 2 (two) times daily. FLUoxetine HCl 20 MG Tabs   Take 1 tablet (20 mg total) by mouth daily.    Commonly known as:  PROZAC           Levothyroxine Sodium 125 MCG Tabs   TAKE ONE TABLET BY Av.tn

## (undated) NOTE — LETTER
07/29/20        Eva Ramos Domenic  [address]  [facility] [address]      Dear Leroy Anderson records indicate that you have outstanding lab work and or testing that was ordered for you and has not yet been completed:

## (undated) NOTE — MR AVS SNAPSHOT
After Visit Summary   3/26/2024    Eva Beasley   MRN: [id number]           Visit Information     Date & Time  3/26/2024 10:40 AM Provider  Emigdio Waters MD [facility] - OB/GYN Dept. Phone  [phone]      Your Vitals Were  Most recent update: 3/26/2024 10:52 AM    BP   111/68    Pulse   72    Wt   237 lb    LMP   03/08/2024 (Approximate)    BMI   40.68 kg/m²         Allergies as of 3/26/2024  Review status set to Review Complete on 3/26/2024       Noted Reaction Type Reactions    Contrave [naltrexone-bupropion Hcl Er] 11/08/2021    DIZZINESS, OTHER (SEE COMMENTS)    Headaches     Victoza 11/08/2021    OTHER (SEE COMMENTS)    Abdominal pain      Your Current Medications        Dosage    Tirzepatide (MOUNJARO) 5 MG/0.5ML Subcutaneous Solution Pen-injector Inject 10 mg into the skin once a week for 28 days.    Tirzepatide (MOUNJARO) 12.5 MG/0.5ML Subcutaneous Solution Pen-injector Inject 12.5 mg into the skin once a week.    Norethin-Eth Estrad Triphasic (ORTHO-NOVUM 7/7/7, 28,) 0.5/0.75/1-35 MG-MCG Oral Tab Take 1 tablet by mouth daily.    ergocalciferol 1.25 MG (45937 UT) Oral Cap Take 1 capsule (50,000 Units total) by mouth once a week.    Continuous Blood Gluc Transmit (DEXCOM G6 TRANSMITTER) Does not apply Misc 1 Units continuous.    Continuous Blood Gluc Sensor (DEXCOM G6 SENSOR) Does not apply Misc 1 each Every 10 days. Use as directed every 10 days    Tirzepatide (MOUNJARO) 10 MG/0.5ML Subcutaneous Solution Pen-injector Inject 10 mg into the skin once a week.    levothyroxine 137 MCG Oral Tab Take 137 mcg by mouth before breakfast.    Loratadine-Pseudoephedrine (EQL ALLERGY/CONGESTION RELIEF OR) Take 1 tablet by mouth Q12H.    pantoprazole 40 MG Oral Tab EC Take 1 tablet (40 mg total) by mouth daily.    fluticasone propionate 50 MCG/ACT Nasal Suspension 1 spray by Nasal route 2 (two) times daily.     Diclofenac-miSOPROStol 50-0.2 MG Oral Tab EC Take 1 tablet by mouth 2 (two) times daily with meals.    escitalopram 10 MG Oral Tab Take 1 tablet (10 mg total) by mouth every morning.    Rcdgegoli-Ozunlzpaz-Nrvlhfrcqp (METAFOLBIC PLUS) 6-2-600 MG Oral Tab Take 1 tablet by mouth daily.    Mometasone Furoate 0.1 % External Cream Apply to affected area(s) BID    Glucose Blood (RELION TRUE METRIX TEST STRIPS) In Vitro Strip Use BID    simethicone 125 MG Oral Chew Tab Chew 2 tablets (250 mg total) by mouth every 6 (six) hours as needed for FLATULENCE.    amLODIPine 5 MG Oral Tab Take 1 tablet (5 mg total) by mouth daily.      Diagnoses for This Visit    Menorrhagia with irregular cycle   [064095]  -  Primary  Fibroid   [615958]    Class 3 severe obesity due to excess calories with serious comorbidity and body mass index (BMI) of 40.0 to 44.9 in adult   [0884728]    Screening for cervical cancer   [777211]             We Ordered the Following     Normal Orders This Visit    Hpv Dna  High Risk , Thin Prep Collect [PDD4562 CUSTOM]     THINPREP PAP SMEAR ONLY [GCW1887 CUSTOM]     ThinPrep PAP Smear [YST1675 CUSTOM]     Future Labs/Procedures Expected by Expires    CBC W Differential W Platelet [[id number] CUSTOM]  3/26/2024 (Approximate) 3/26/2025    FSH [2004309 CUSTOM]  3/26/2024 (Approximate) 3/26/2025    Hpv Dna  High Risk , Thin Prep Collect [[id number] CUSTOM]  3/26/2024 3/26/2025    ThinPrep PAP Smear [[id number] CUSTOM]  3/26/2024 3/26/2025    TSH Assay, Thyroid Stim Hormone [2004259 CUSTOM]  3/26/2024 (Approximate) 3/26/2025    US PELVIS (TRANSABDOMINAL AND TRANSVAGINAL) (CPT=76856/81137) [05978 CPT(R)]  3/26/2024 (Approximate) 3/26/2025      Future Appointments        Provider Department    4/18/2024 5:30 PM [facility] US RM6 PM [facility] Ultrasound [facility]    4/23/2024 10:40 AM Emigdio Waters [facility] Medical [facility]    7/13/2024 10:15 AM Carter Weir  [facility] Medical [facility]      Imaging Scheduling Instructions     Around March 26, 2024   Imaging:   US PELVIS (TRANSABDOMINAL AND TRANSVAGINAL) (CPT=76856/26365)    Instructions: To schedule an appointment for your radiology test please call [facility] Central Scheduling at [phone].                    Did you know that [facility] primary care physicians now offer Video Visits through [employer] for adult patients for a variety of conditions such as allergies, back pain and cold symptoms? Skip the drive and waiting room and online chat with a doctor face-to-face using your web-cam enabled computer or mobile device wherever you are. Video Visits cost $50 and can be paid hassle-free using a credit, debit, or health savings card.  Not active on [employer]? Ask us how to get signed up today!          If you receive a survey from [facility], please take a few minutes to complete it and provide feedback. We strive to deliver the best patient experience and are looking for ways to make improvements. Your feedback will help us do so. For more information on [facility], please visit [email or web address]           No text in SmartText           No text in SmartText

## (undated) NOTE — LETTER
10/25/19        Eva Jeannie Maryclare Course  [address]  [facility] [address]      Estimado Eva Jeannie Maryclare Course,    Nuestros registros indican que tiene análisis clínicos pendientes y/o pruebas que se ordenaron en s

## (undated) NOTE — LETTER
AUTHORIZATION FOR SURGICAL OPERATION OR OTHER PROCEDURE    1. I hereby authorize Dr. BRANDEN HUNTER, and [facility] staff assigned to my case to perform the following operation and/or procedure at the [facility] site:        _______________________________________________________________________________________________    2.  My physician has explained the nature and purpose of the operation or other procedure, possible alternative methods of treatment, the risks involved, and the possibility of complication to me.  I acknowledge that no guarantee has been made as to the result that may be obtained.  3.  I recognize that, during the course of this operation, or other procedure, unforseen conditions may necessitate additional or different procedure than those listed above.  I, therefore, further authorize and request that the above named physician, his/her physician assistants or designees perform such procedures as are, in his/her professional opinion, necessary and desirable.  4.  Any tissue or organs removed in the operation or other procedure may be disposed of by and at the discretion of the [facility] and [facility].  5.  I understand that in the event of a medical emergency, I will be transported by local paramedics to [facility] or other hospital emergency department.  6.  I certify that I have read and fully understand the above consent to operation and/or other procedure.    7.  I acknowledge that my physician has explained sedation/analgesia administration to me including the risks and benefits.  I consent to the administration of sedation/analgesia as may be necessary or desirable in the judgement of my physician.    Witness signature: ___________________________________________________ Date:  ______/______/_____                    Time:  ________ A.M.  P.M.       Patient Name:   ______________________________________________________  (please print)      Patient signature:  ___________________________________________________             Relationship to Patient:           []  Parent    Responsible person                          []  Spouse  In case of minor or                    [] Other  _____________   Incompetent name:  __________________________________________________                               (please print)      _____________      Responsible person  In case of minor or  Incompetent signature:  _______________________________________________    Statement of Physician  My signature below affirms that prior to the time of the procedure, I have explained to the patient and/or his/her guardian, the risks and benefits involved in the proposed treatment and any reasonable alternative to the proposed treatment.  I have also explained the risks and benefits involved in the refusal of the proposed treatment and have answered the patient's questions.                        Date:  ______/______/_______  Provider                      Signature:  __________________________________________________________       Time:  ___________ A.M    P.M.

## (undated) NOTE — MR AVS SNAPSHOT
[address], [address]  [address] [facility]  [phone]               Thank you for choosing us for your health care visit with Stephanie Maldonado MD.  We are glad to serve you and happy to provide you with this summ Generic drug:  Norethin-Eth Estrad Triphasic                Where to Get Your Medications      You can get these medications from any pharmacy     Bring a paper prescription for each of these medications    - Phentermine HCl 37.5 MG Caps            MyChart

## (undated) NOTE — LETTER
AUTHORIZATION FOR SURGICAL OPERATION OR OTHER PROCEDURE  1.  I hereby authorize Dr. Washingtno Samano, and [facility] staff assigned to my case to perform the following operation and/or procedure at the [facility]:    _________________________ Time:  ________ A. M.  P.M.        Patient Name:  ______________________________________________________  (please print)      Patient signature:  ___________________________________________________             Relationship to Patient:

## (undated) NOTE — LETTER
AUTHORIZATION FOR SURGICAL OPERATION OR OTHER PROCEDURE    1. I hereby authorize Dr. Rodriguez/ Nguyen Bates PA-C, and [facility] staff assigned to my case to perform the following operation and/or procedure at the [facility] site:    _______________________________________________________________________________________________    Durolane injection to Right knee  _______________________________________________________________________________________________    2.  My physician has explained the nature and purpose of the operation or other procedure, possible alternative methods of treatment, the risks involved, and the possibility of complication to me.  I acknowledge that no guarantee has been made as to the result that may be obtained.  3.  I recognize that, during the course of this operation, or other procedure, unforseen conditions may necessitate additional or different procedure than those listed above.  I, therefore, further authorize and request that the above named physician, his/her physician assistants or designees perform such procedures as are, in his/her professional opinion, necessary and desirable.  4.  Any tissue or organs removed in the operation or other procedure may be disposed of by and at the discretion of the [facility] and [facility].  5.  I understand that in the event of a medical emergency, I will be transported by local paramedics to [facility] or other hospital emergency department.  6.  I certify that I have read and fully understand the above consent to operation and/or other procedure.    7.  I acknowledge that my physician has explained sedation/analgesia administration to me including the risks and benefits.  I consent to the administration of sedation/analgesia as may be necessary or desirable in the judgement of my physician.    Witness signature: ___________________________________________________ Date:   ______/______/_____                    Time:  ________ A.M.  P.M.       Patient Name:  ______________________________________________________  (please print)      Patient signature:  ___________________________________________________             Relationship to Patient:           []  Parent    Responsible person                          []  Spouse  In case of minor or                    [] Other  _____________   Incompetent name:  __________________________________________________                               (please print)      _____________      Responsible person  In case of minor or  Incompetent signature:  _______________________________________________    Statement of Physician  My signature below affirms that prior to the time of the procedure, I have explained to the patient and/or his/her guardian, the risks and benefits involved in the proposed treatment and any reasonable alternative to the proposed treatment.  I have also explained the risks and benefits involved in the refusal of the proposed treatment and have answered the patient's questions.                        Date:  ______/______/_______  Provider                      Signature:  __________________________________________________________       Time:  ___________ A.M    P.M.